# Patient Record
Sex: MALE | Race: WHITE | Employment: FULL TIME | URBAN - NONMETROPOLITAN AREA
[De-identification: names, ages, dates, MRNs, and addresses within clinical notes are randomized per-mention and may not be internally consistent; named-entity substitution may affect disease eponyms.]

---

## 2017-01-06 ENCOUNTER — NURSE ONLY (OUTPATIENT)
Dept: PRIMARY CARE CLINIC | Age: 57
End: 2017-01-06
Payer: COMMERCIAL

## 2017-01-06 DIAGNOSIS — E78.2 MIXED HYPERLIPIDEMIA: Primary | ICD-10-CM

## 2017-01-06 DIAGNOSIS — Z51.81 ENCOUNTER FOR MEDICATION MONITORING: ICD-10-CM

## 2017-01-06 LAB
ALBUMIN SERPL-MCNC: 4.3 G/DL (ref 3.5–5.2)
ALP BLD-CCNC: 83 U/L (ref 40–130)
ALT SERPL-CCNC: 34 U/L (ref 5–41)
ANION GAP SERPL CALCULATED.3IONS-SCNC: 17 MMOL/L (ref 7–19)
AST SERPL-CCNC: 19 U/L (ref 5–40)
BILIRUB SERPL-MCNC: 0.4 MG/DL (ref 0.2–1.2)
BUN BLDV-MCNC: 14 MG/DL (ref 6–20)
CALCIUM SERPL-MCNC: 8.8 MG/DL (ref 8.6–10)
CHLORIDE BLD-SCNC: 104 MMOL/L (ref 98–111)
CHOLESTEROL, TOTAL: 215 MG/DL (ref 160–199)
CO2: 22 MMOL/L (ref 22–29)
CREAT SERPL-MCNC: 1 MG/DL (ref 0.5–1.2)
GFR NON-AFRICAN AMERICAN: >60
GLOBULIN: 2.1 G/DL
GLUCOSE BLD-MCNC: 93 MG/DL (ref 74–109)
HDLC SERPL-MCNC: 30 MG/DL (ref 55–121)
LDL CHOLESTEROL CALCULATED: 157 MG/DL
POTASSIUM SERPL-SCNC: 4.8 MMOL/L (ref 3.5–5)
SODIUM BLD-SCNC: 143 MMOL/L (ref 136–145)
TOTAL PROTEIN: 6.4 G/DL (ref 6.6–8.7)
TRIGL SERPL-MCNC: 138 MG/DL (ref 150–199)

## 2017-01-06 PROCEDURE — 36415 COLL VENOUS BLD VENIPUNCTURE: CPT | Performed by: NURSE PRACTITIONER

## 2017-02-09 ENCOUNTER — OFFICE VISIT (OUTPATIENT)
Dept: PRIMARY CARE CLINIC | Age: 57
End: 2017-02-09
Payer: COMMERCIAL

## 2017-02-09 VITALS
DIASTOLIC BLOOD PRESSURE: 70 MMHG | BODY MASS INDEX: 27.02 KG/M2 | TEMPERATURE: 98.6 F | RESPIRATION RATE: 16 BRPM | OXYGEN SATURATION: 98 % | SYSTOLIC BLOOD PRESSURE: 118 MMHG | HEIGHT: 73 IN | HEART RATE: 73 BPM | WEIGHT: 203.9 LBS

## 2017-02-09 DIAGNOSIS — R52 BODY ACHES: ICD-10-CM

## 2017-02-09 DIAGNOSIS — R05.9 COUGH: ICD-10-CM

## 2017-02-09 DIAGNOSIS — Z23 NEED FOR INFLUENZA VACCINATION: ICD-10-CM

## 2017-02-09 DIAGNOSIS — J40 BRONCHITIS: Primary | ICD-10-CM

## 2017-02-09 DIAGNOSIS — R09.89 CHEST CONGESTION: ICD-10-CM

## 2017-02-09 PROCEDURE — 94640 AIRWAY INHALATION TREATMENT: CPT | Performed by: NURSE PRACTITIONER

## 2017-02-09 PROCEDURE — 87804 INFLUENZA ASSAY W/OPTIC: CPT | Performed by: NURSE PRACTITIONER

## 2017-02-09 PROCEDURE — 96372 THER/PROPH/DIAG INJ SC/IM: CPT | Performed by: NURSE PRACTITIONER

## 2017-02-09 PROCEDURE — 99213 OFFICE O/P EST LOW 20 MIN: CPT | Performed by: NURSE PRACTITIONER

## 2017-02-09 PROCEDURE — 90688 IIV4 VACCINE SPLT 0.5 ML IM: CPT | Performed by: NURSE PRACTITIONER

## 2017-02-09 PROCEDURE — 90471 IMMUNIZATION ADMIN: CPT | Performed by: NURSE PRACTITIONER

## 2017-02-09 RX ORDER — PREDNISONE 10 MG/1
TABLET ORAL
Qty: 18 TABLET | Refills: 0 | Status: SHIPPED | OUTPATIENT
Start: 2017-02-09 | End: 2017-03-27

## 2017-02-09 RX ORDER — CEFUROXIME AXETIL 250 MG/1
TABLET ORAL
Refills: 0 | COMMUNITY
Start: 2016-12-30 | End: 2017-02-09

## 2017-02-09 RX ORDER — ROSUVASTATIN CALCIUM 10 MG/1
10 TABLET, FILM COATED ORAL DAILY
Qty: 30 TABLET | Refills: 11 | Status: SHIPPED | OUTPATIENT
Start: 2017-02-09 | End: 2018-01-22 | Stop reason: SDUPTHER

## 2017-02-09 RX ORDER — AZITHROMYCIN 250 MG/1
TABLET, FILM COATED ORAL
Qty: 1 PACKET | Refills: 0 | Status: SHIPPED | OUTPATIENT
Start: 2017-02-09 | End: 2017-02-19

## 2017-02-09 RX ORDER — TRIAMCINOLONE ACETONIDE 40 MG/ML
40 INJECTION, SUSPENSION INTRA-ARTICULAR; INTRAMUSCULAR ONCE
Status: COMPLETED | OUTPATIENT
Start: 2017-02-09 | End: 2017-02-09

## 2017-02-09 RX ADMIN — TRIAMCINOLONE ACETONIDE 40 MG: 40 INJECTION, SUSPENSION INTRA-ARTICULAR; INTRAMUSCULAR at 09:36

## 2017-02-09 ASSESSMENT — ENCOUNTER SYMPTOMS: COUGH: 1

## 2017-02-10 LAB
INFLUENZA A ANTIBODY: NORMAL
INFLUENZA B ANTIBODY: NORMAL

## 2017-03-21 DIAGNOSIS — G44.021 INTRACTABLE CHRONIC CLUSTER HEADACHE: ICD-10-CM

## 2017-03-21 RX ORDER — SUMATRIPTAN 6 MG/.5ML
INJECTION, SOLUTION SUBCUTANEOUS
Qty: 0.5 ML | Refills: 5 | Status: SHIPPED | OUTPATIENT
Start: 2017-03-21 | End: 2017-07-07

## 2017-03-27 ENCOUNTER — OFFICE VISIT (OUTPATIENT)
Dept: NEUROLOGY | Age: 57
End: 2017-03-27
Payer: COMMERCIAL

## 2017-03-27 VITALS
SYSTOLIC BLOOD PRESSURE: 117 MMHG | DIASTOLIC BLOOD PRESSURE: 71 MMHG | RESPIRATION RATE: 16 BRPM | HEART RATE: 74 BPM | HEIGHT: 73 IN | BODY MASS INDEX: 26.51 KG/M2 | WEIGHT: 200 LBS

## 2017-03-27 DIAGNOSIS — G44.021 INTRACTABLE CHRONIC CLUSTER HEADACHE: Primary | ICD-10-CM

## 2017-03-27 PROCEDURE — 99213 OFFICE O/P EST LOW 20 MIN: CPT | Performed by: PHYSICIAN ASSISTANT

## 2017-05-08 ENCOUNTER — TELEPHONE (OUTPATIENT)
Dept: NEUROLOGY | Age: 57
End: 2017-05-08

## 2017-05-12 RX ORDER — BUTALBITAL, ACETAMINOPHEN AND CAFFEINE 50; 325; 40 MG/1; MG/1; MG/1
TABLET ORAL
Qty: 30 TABLET | Refills: 2 | Status: SHIPPED | OUTPATIENT
Start: 2017-05-12 | End: 2017-09-25 | Stop reason: SDUPTHER

## 2017-05-12 RX ORDER — METHYLPREDNISOLONE 4 MG/1
TABLET ORAL
Qty: 1 KIT | Refills: 0 | Status: SHIPPED | OUTPATIENT
Start: 2017-05-12 | End: 2017-05-18

## 2017-06-22 ENCOUNTER — TELEPHONE (OUTPATIENT)
Dept: NEUROLOGY | Age: 57
End: 2017-06-22

## 2017-06-27 ENCOUNTER — TELEPHONE (OUTPATIENT)
Dept: NEUROLOGY | Age: 57
End: 2017-06-27

## 2017-06-29 ENCOUNTER — OFFICE VISIT (OUTPATIENT)
Dept: NEUROLOGY | Age: 57
End: 2017-06-29
Payer: COMMERCIAL

## 2017-06-29 VITALS
WEIGHT: 197.6 LBS | HEART RATE: 82 BPM | RESPIRATION RATE: 16 BRPM | HEIGHT: 73 IN | BODY MASS INDEX: 26.19 KG/M2 | DIASTOLIC BLOOD PRESSURE: 66 MMHG | SYSTOLIC BLOOD PRESSURE: 136 MMHG

## 2017-06-29 DIAGNOSIS — G44.021 INTRACTABLE CHRONIC CLUSTER HEADACHE: Primary | ICD-10-CM

## 2017-06-29 PROCEDURE — 99214 OFFICE O/P EST MOD 30 MIN: CPT | Performed by: PSYCHIATRY & NEUROLOGY

## 2017-06-29 RX ORDER — LANOLIN ALCOHOL/MO/W.PET/CERES
3 CREAM (GRAM) TOPICAL DAILY
COMMUNITY

## 2017-06-29 RX ORDER — ALPRAZOLAM 0.5 MG/1
0.5 TABLET ORAL 2 TIMES DAILY PRN
Qty: 60 TABLET | Refills: 2 | Status: SHIPPED | OUTPATIENT
Start: 2017-06-29 | End: 2018-02-07 | Stop reason: SDUPTHER

## 2017-06-29 RX ORDER — PREDNISONE 20 MG/1
TABLET ORAL
Qty: 20 TABLET | Refills: 0 | Status: SHIPPED | OUTPATIENT
Start: 2017-06-29 | End: 2017-07-07

## 2017-07-07 ENCOUNTER — OFFICE VISIT (OUTPATIENT)
Dept: PRIMARY CARE CLINIC | Age: 57
End: 2017-07-07
Payer: COMMERCIAL

## 2017-07-07 VITALS
WEIGHT: 200 LBS | RESPIRATION RATE: 16 BRPM | HEART RATE: 68 BPM | SYSTOLIC BLOOD PRESSURE: 114 MMHG | DIASTOLIC BLOOD PRESSURE: 69 MMHG | TEMPERATURE: 97.5 F | HEIGHT: 73 IN | BODY MASS INDEX: 26.51 KG/M2

## 2017-07-07 DIAGNOSIS — G44.001 CLUSTER HEADACHE, INTRACTABLE: ICD-10-CM

## 2017-07-07 DIAGNOSIS — Z79.899 MEDICATION MANAGEMENT: ICD-10-CM

## 2017-07-07 DIAGNOSIS — Z12.5 PROSTATE CANCER SCREENING: ICD-10-CM

## 2017-07-07 DIAGNOSIS — E78.2 MIXED HYPERLIPIDEMIA: ICD-10-CM

## 2017-07-07 DIAGNOSIS — Z00.00 WELL ADULT HEALTH CHECK: Primary | ICD-10-CM

## 2017-07-07 DIAGNOSIS — F17.210 CIGARETTE NICOTINE DEPENDENCE WITHOUT COMPLICATION: ICD-10-CM

## 2017-07-07 DIAGNOSIS — I65.23 BILATERAL CAROTID ARTERY STENOSIS: ICD-10-CM

## 2017-07-07 LAB
ALBUMIN SERPL-MCNC: 4.1 G/DL (ref 3.5–5.2)
ALP BLD-CCNC: 77 U/L (ref 40–130)
ALT SERPL-CCNC: 43 U/L (ref 5–41)
ANION GAP SERPL CALCULATED.3IONS-SCNC: 20 MMOL/L (ref 7–19)
AST SERPL-CCNC: 20 U/L (ref 5–40)
BILIRUB SERPL-MCNC: 0.4 MG/DL (ref 0.2–1.2)
BUN BLDV-MCNC: 13 MG/DL (ref 6–20)
CALCIUM SERPL-MCNC: 9.3 MG/DL (ref 8.6–10)
CHLORIDE BLD-SCNC: 103 MMOL/L (ref 98–111)
CHOLESTEROL, TOTAL: 190 MG/DL (ref 160–199)
CO2: 20 MMOL/L (ref 22–29)
CREAT SERPL-MCNC: 1.1 MG/DL (ref 0.5–1.2)
GFR NON-AFRICAN AMERICAN: >60
GLUCOSE BLD-MCNC: 102 MG/DL (ref 74–109)
HCT VFR BLD CALC: 46.4 % (ref 42–52)
HDLC SERPL-MCNC: 46 MG/DL (ref 55–121)
HEMOGLOBIN: 15.9 G/DL (ref 14–18)
LDL CHOLESTEROL CALCULATED: 105 MG/DL
MCH RBC QN AUTO: 34.2 PG (ref 27–31)
MCHC RBC AUTO-ENTMCNC: 34.3 G/DL (ref 33–37)
MCV RBC AUTO: 99.8 FL (ref 80–94)
PDW BLD-RTO: 13.7 % (ref 11.5–14.5)
PLATELET # BLD: 245 K/UL (ref 130–400)
PMV BLD AUTO: 9.6 FL (ref 9.4–12.4)
POTASSIUM SERPL-SCNC: 4.2 MMOL/L (ref 3.5–5)
PROSTATE SPECIFIC ANTIGEN: 0.54 NG/ML (ref 0–4)
RBC # BLD: 4.65 M/UL (ref 4.7–6.1)
SODIUM BLD-SCNC: 143 MMOL/L (ref 136–145)
TOTAL PROTEIN: 7.1 G/DL (ref 6.6–8.7)
TRIGL SERPL-MCNC: 197 MG/DL (ref 150–199)
WBC # BLD: 16.6 K/UL (ref 4.8–10.8)

## 2017-07-07 PROCEDURE — 36415 COLL VENOUS BLD VENIPUNCTURE: CPT | Performed by: NURSE PRACTITIONER

## 2017-07-07 PROCEDURE — 99396 PREV VISIT EST AGE 40-64: CPT | Performed by: NURSE PRACTITIONER

## 2017-07-07 RX ORDER — CEFUROXIME AXETIL 250 MG/1
TABLET ORAL
Refills: 5 | COMMUNITY
Start: 2017-06-14 | End: 2018-04-02 | Stop reason: ALTCHOICE

## 2017-07-07 ASSESSMENT — ENCOUNTER SYMPTOMS
EYE ITCHING: 0
PHOTOPHOBIA: 0
EYE DISCHARGE: 0
ALLERGIC/IMMUNOLOGIC NEGATIVE: 1
EYE PAIN: 0
EYE REDNESS: 0

## 2017-07-28 ENCOUNTER — NURSE ONLY (OUTPATIENT)
Dept: PRIMARY CARE CLINIC | Age: 57
End: 2017-07-28
Payer: COMMERCIAL

## 2017-07-28 DIAGNOSIS — D72.828 OTHER ELEVATED WHITE BLOOD CELL (WBC) COUNT: Primary | ICD-10-CM

## 2017-07-28 LAB
BASOPHILS ABSOLUTE: 0 K/UL (ref 0–0.2)
BASOPHILS RELATIVE PERCENT: 0.4 % (ref 0–1)
EOSINOPHILS ABSOLUTE: 0.2 K/UL (ref 0–0.6)
EOSINOPHILS RELATIVE PERCENT: 1.9 % (ref 0–5)
HCT VFR BLD CALC: 45.1 % (ref 42–52)
HEMOGLOBIN: 15.2 G/DL (ref 14–18)
LYMPHOCYTES ABSOLUTE: 2.7 K/UL (ref 1.1–4.5)
LYMPHOCYTES RELATIVE PERCENT: 27.6 % (ref 20–40)
MCH RBC QN AUTO: 34.2 PG (ref 27–31)
MCHC RBC AUTO-ENTMCNC: 33.7 G/DL (ref 33–37)
MCV RBC AUTO: 101.3 FL (ref 80–94)
MONOCYTES ABSOLUTE: 0.8 K/UL (ref 0–0.9)
MONOCYTES RELATIVE PERCENT: 7.9 % (ref 0–10)
NEUTROPHILS ABSOLUTE: 6.1 K/UL (ref 1.5–7.5)
NEUTROPHILS RELATIVE PERCENT: 61.9 % (ref 50–65)
PDW BLD-RTO: 13.3 % (ref 11.5–14.5)
PLATELET # BLD: 254 K/UL (ref 130–400)
PMV BLD AUTO: 9.6 FL (ref 9.4–12.4)
RBC # BLD: 4.45 M/UL (ref 4.7–6.1)
WBC # BLD: 9.9 K/UL (ref 4.8–10.8)

## 2017-07-28 PROCEDURE — 36415 COLL VENOUS BLD VENIPUNCTURE: CPT | Performed by: NURSE PRACTITIONER

## 2017-08-10 DIAGNOSIS — G44.021 INTRACTABLE CHRONIC CLUSTER HEADACHE: ICD-10-CM

## 2017-08-10 RX ORDER — VERAPAMIL HYDROCHLORIDE 240 MG/1
240 TABLET, FILM COATED, EXTENDED RELEASE ORAL 2 TIMES DAILY
Qty: 60 TABLET | Refills: 5 | Status: SHIPPED | OUTPATIENT
Start: 2017-08-10 | End: 2018-01-22 | Stop reason: SDUPTHER

## 2017-10-02 ENCOUNTER — OFFICE VISIT (OUTPATIENT)
Dept: NEUROLOGY | Age: 57
End: 2017-10-02
Payer: COMMERCIAL

## 2017-10-02 VITALS
SYSTOLIC BLOOD PRESSURE: 109 MMHG | HEIGHT: 73 IN | WEIGHT: 197 LBS | RESPIRATION RATE: 18 BRPM | HEART RATE: 67 BPM | BODY MASS INDEX: 26.11 KG/M2 | DIASTOLIC BLOOD PRESSURE: 72 MMHG

## 2017-10-02 DIAGNOSIS — G44.021 INTRACTABLE CHRONIC CLUSTER HEADACHE: Primary | ICD-10-CM

## 2017-10-02 PROCEDURE — 99213 OFFICE O/P EST LOW 20 MIN: CPT | Performed by: PSYCHIATRY & NEUROLOGY

## 2017-10-02 NOTE — MR AVS SNAPSHOT
Blood Pressure Pulse Respirations Height Weight Body Mass Index    109/72 67 18 6' 1\" (1.854 m) 197 lb (89.4 kg) 25.99 kg/m2    Smoking Status                   Current Every Day Smoker           Additional Information about your Body Mass Index (BMI)           Your BMI as listed above is considered overweight (25.0-29.9). BMI is an estimate of body fat, calculated from your height and weight. The higher your BMI, the greater your risk of heart disease, high blood pressure, type 2 diabetes, stroke, gallstones, arthritis, sleep apnea, and certain cancers. BMI is not perfect. It may overestimate body fat in athletes and people who are more muscular. If your body fat is high you can improve your BMI by decreasing your calorie intake and becoming more physically active. Learn more at: [email or web address]             Medications and Orders      Your Current Medications Are              butalbital-acetaminophen-caffeine (FIORICET, ESGIC) -40 MG per tablet TAKE 1 TABLET BY MOUTH TWICE DAILY AS NEEDED FOR HEADACHE    verapamil (CALAN SR) 240 MG extended release tablet Take 1 tablet by mouth 2 times daily    SUMAtriptan Succinate 6 MG/0.5ML SOAJ     melatonin 3 MG TABS tablet Take 3 mg by mouth daily Indications: 2 PO QHS    CRESTOR 10 MG tablet Take 1 tablet by mouth daily    SUMAtriptan (IMITREX) 100 MG tablet Take 1 at the onset of headache, may repeat 1 time in 2 hours if headache persists, maximum of 2 in 24 hours or 4 in 1 week. sildenafil (VIAGRA) 50 MG tablet Take 1 tablet by mouth as needed for Erectile Dysfunction    aspirin 81 MG tablet Take 81 mg by mouth daily.       Allergies           No Known Allergies         Additional Information        Basic Information     Date Of Birth Sex Race Ethnicity Preferred Language    1960 Male White Non-/Non  English      Problem List as of 10/2/2017  Date Reviewed: 10/2/2017 Cluster headache, intractable    Intractable chronic cluster headache    Migraine headache    Carotid artery stenosis    Colon cancer screening    Hyperlipidemia    Bilateral carotid artery stenosis    BPH (benign prostatic hyperplasia)    Migraines    Nicotine dependence      Immunizations as of 10/2/2017     Name Date    Influenza, Nancie Cranker, 3 Years and older, IM 2/9/2017    Pneumococcal Polysaccharide ([id number]) 6/27/2016    Td 6/27/2016      Preventive Care        Date Due    Diabetes Screening 8/4/2001    Yearly Flu Vaccine (1) 9/1/2017    Hepatitis C screening is recommended for all adults regardless of risk factors born between [facility] at least once (lifetime) who have never been tested. 7/18/2019 (Originally 1960)    HIV screening is recommended for all people regardless of risk factors  aged 15-65 years at least once (lifetime) who have never been HIV tested. 7/19/2019 (Originally 8/4/1976)    Tetanus Combination Vaccine (1 - Tdap) 2/11/2026 (Originally 6/28/2016)    Cholesterol Screening 7/7/2022    Colonoscopy 3/12/2023            [employer] Signup           Our records indicate that you have an active [employer] account. You can view your After Visit Summary by going to [email or web address] and logging in with your [employer] username and password. If you don't have a [employer] username and password but a parent or guardian has access to your record, the parent or guardian should login with their own [employer] username and password and access your record to view the After Visit Summary. Additional Information  If you have questions, please contact the physician practice where you receive care. Remember, [employer] is NOT to be used for urgent needs. For medical emergencies, dial 911. For questions regarding your [employer] account call [phone]. If you have a clinical question, please call your doctor's office.

## 2017-10-02 NOTE — PROGRESS NOTES
SR) 240 MG extended release tablet Take 1 tablet by mouth 2 times daily 60 tablet 5    SUMAtriptan Succinate 6 MG/0.5ML SOAJ   5    melatonin 3 MG TABS tablet Take 3 mg by mouth daily Indications: 2 PO QHS      CRESTOR 10 MG tablet Take 1 tablet by mouth daily 30 tablet 11    SUMAtriptan (IMITREX) 100 MG tablet Take 1 at the onset of headache, may repeat 1 time in 2 hours if headache persists, maximum of 2 in 24 hours or 4 in 1 week. 9 tablet 11    sildenafil (VIAGRA) 50 MG tablet Take 1 tablet by mouth as needed for Erectile Dysfunction 12 tablet 3    aspirin 81 MG tablet Take 81 mg by mouth daily. No current facility-administered medications for this visit. Allergies: Allergies as of 10/02/2017    (No Known Allergies)       Examination:  Vitals:  /72  Pulse 67  Resp 18  Ht 6' 1\" (1.854 m)  Wt 197 lb (89.4 kg)  BMI 25.99 kg/m2  General appearance:  alert and cooperative with exam  HEENT:  Sclera clear, anicteric, Oropharynx clear, no lesions, Neck supple with midline trachea, Thyroid without masses and Trachea midline  Heart[de-identified]  regular rate and rhythm, S1, S2 normal, no murmur, click, rub or gallop  Lungs:  clear to auscultation bilaterally  Extremities:  extremities normal, atraumatic, no cyanosis or edema  Neurologic:  Extraocular movements are intact without nystagmus. Visual fields are full to confrontation. Facial movements are symmetrical and normal.  Speech is precise. Extremity strength is normal in both uppers and lowers. Deep tendon reflexes are intact and symmetrical.  Rapid alternating movements are unimpaired. Finger-to-nose testing is performed well, without dysmetria. Gait is normal.      Assessment:       ICD-10-CM ICD-9-CM    1. Intractable chronic cluster headache G44.021 339.02    Doing well clinically    Plan:   1. Continue the same medications  2. Advised smoking cessation  3. FU in 6 months.     Electronically signed by Millie Mac MD on 10/2/2017

## 2017-10-05 ENCOUNTER — HOSPITAL ENCOUNTER (OUTPATIENT)
Dept: VASCULAR LAB | Age: 57
Discharge: HOME | End: 2017-10-05
Payer: COMMERCIAL

## 2017-10-05 ENCOUNTER — TELEPHONE (OUTPATIENT)
Dept: VASCULAR SURGERY | Age: 57
End: 2017-10-05

## 2017-10-05 DIAGNOSIS — I65.23 BILATERAL CAROTID ARTERY STENOSIS: Primary | ICD-10-CM

## 2017-10-05 DIAGNOSIS — I65.23 BILATERAL CAROTID ARTERY STENOSIS: ICD-10-CM

## 2017-10-05 PROCEDURE — 93880 EXTRACRANIAL BILAT STUDY: CPT

## 2018-01-04 DIAGNOSIS — G44.021 INTRACTABLE CHRONIC CLUSTER HEADACHE: ICD-10-CM

## 2018-01-05 RX ORDER — SUMATRIPTAN 100 MG/1
TABLET, FILM COATED ORAL
Qty: 9 TABLET | Refills: 0 | Status: SHIPPED | OUTPATIENT
Start: 2018-01-05 | End: 2018-02-18 | Stop reason: SDUPTHER

## 2018-01-19 ENCOUNTER — NURSE ONLY (OUTPATIENT)
Dept: PRIMARY CARE CLINIC | Age: 58
End: 2018-01-19
Payer: COMMERCIAL

## 2018-01-19 DIAGNOSIS — Z51.81 ENCOUNTER FOR MEDICATION MONITORING: ICD-10-CM

## 2018-01-19 DIAGNOSIS — E78.2 MIXED HYPERLIPIDEMIA: Primary | ICD-10-CM

## 2018-01-19 LAB
ALBUMIN SERPL-MCNC: 4.1 G/DL (ref 3.5–5.2)
ALP BLD-CCNC: 84 U/L (ref 40–130)
ALT SERPL-CCNC: 18 U/L (ref 5–41)
ANION GAP SERPL CALCULATED.3IONS-SCNC: 16 MMOL/L (ref 7–19)
AST SERPL-CCNC: 18 U/L (ref 5–40)
BILIRUB SERPL-MCNC: 0.4 MG/DL (ref 0.2–1.2)
BUN BLDV-MCNC: 10 MG/DL (ref 6–20)
CALCIUM SERPL-MCNC: 8.9 MG/DL (ref 8.6–10)
CHLORIDE BLD-SCNC: 103 MMOL/L (ref 98–111)
CHOLESTEROL, TOTAL: 186 MG/DL (ref 160–199)
CO2: 22 MMOL/L (ref 22–29)
CREAT SERPL-MCNC: 1 MG/DL (ref 0.5–1.2)
GFR NON-AFRICAN AMERICAN: >60
GLUCOSE BLD-MCNC: 81 MG/DL (ref 74–109)
HDLC SERPL-MCNC: 30 MG/DL (ref 55–121)
LDL CHOLESTEROL CALCULATED: 132 MG/DL
POTASSIUM SERPL-SCNC: 4.4 MMOL/L (ref 3.5–5)
SODIUM BLD-SCNC: 141 MMOL/L (ref 136–145)
TOTAL PROTEIN: 6.9 G/DL (ref 6.6–8.7)
TRIGL SERPL-MCNC: 121 MG/DL (ref 0–149)

## 2018-01-19 PROCEDURE — 36415 COLL VENOUS BLD VENIPUNCTURE: CPT | Performed by: NURSE PRACTITIONER

## 2018-01-22 DIAGNOSIS — G44.021 INTRACTABLE CHRONIC CLUSTER HEADACHE: ICD-10-CM

## 2018-01-22 RX ORDER — VERAPAMIL HYDROCHLORIDE 240 MG/1
240 TABLET, FILM COATED, EXTENDED RELEASE ORAL 2 TIMES DAILY
Qty: 60 TABLET | Refills: 5 | Status: SHIPPED | OUTPATIENT
Start: 2018-01-22 | End: 2018-07-25 | Stop reason: SDUPTHER

## 2018-01-22 RX ORDER — ROSUVASTATIN CALCIUM 10 MG/1
10 TABLET, FILM COATED ORAL DAILY
Qty: 30 TABLET | Refills: 11 | Status: SHIPPED | OUTPATIENT
Start: 2018-01-22 | End: 2018-07-27 | Stop reason: SDUPTHER

## 2018-02-07 RX ORDER — ALPRAZOLAM 0.5 MG/1
TABLET ORAL
Qty: 60 TABLET | Refills: 2 | Status: SHIPPED | OUTPATIENT
Start: 2018-02-07 | End: 2018-03-09

## 2018-02-15 RX ORDER — ROSUVASTATIN CALCIUM 10 MG/1
10 TABLET, FILM COATED ORAL DAILY
Qty: 30 TABLET | Refills: 5 | Status: SHIPPED | OUTPATIENT
Start: 2018-02-15 | End: 2018-03-05

## 2018-03-05 ENCOUNTER — OFFICE VISIT (OUTPATIENT)
Dept: GASTROENTEROLOGY | Age: 58
End: 2018-03-05
Payer: COMMERCIAL

## 2018-03-05 VITALS
SYSTOLIC BLOOD PRESSURE: 120 MMHG | DIASTOLIC BLOOD PRESSURE: 66 MMHG | OXYGEN SATURATION: 97 % | HEART RATE: 65 BPM | WEIGHT: 206 LBS | HEIGHT: 73 IN | BODY MASS INDEX: 27.3 KG/M2

## 2018-03-05 DIAGNOSIS — Z86.010 HISTORY OF ADENOMATOUS POLYP OF COLON: Primary | ICD-10-CM

## 2018-03-05 PROBLEM — Z86.0101 HISTORY OF ADENOMATOUS POLYP OF COLON: Status: ACTIVE | Noted: 2018-03-05

## 2018-03-05 PROCEDURE — 99203 OFFICE O/P NEW LOW 30 MIN: CPT | Performed by: NURSE PRACTITIONER

## 2018-03-05 ASSESSMENT — ENCOUNTER SYMPTOMS
ABDOMINAL DISTENTION: 0
DIARRHEA: 0
RECTAL PAIN: 0
SHORTNESS OF BREATH: 0
SORE THROAT: 0
COUGH: 0
BACK PAIN: 0
VOICE CHANGE: 0
NAUSEA: 0
CONSTIPATION: 0
ABDOMINAL PAIN: 0
CHEST TIGHTNESS: 0
BLOOD IN STOOL: 0
VOMITING: 0

## 2018-03-05 NOTE — PROGRESS NOTES
Subjective:      Patient ID: Maye Thomas is a 65 y.o. male. PCP: Anitra Nguyễn CNP     HPI    Chief Complaint   Patient presents with    Colonoscopy     5yr recall    Other     history of colon polyps       Patient due for screening colonoscopy, history of adenomatous colon polyps    The patient denies abdominal or flank pain, anorexia, nausea or vomiting, dysphagia, change in bowel habits or black or bloody stools or weight loss. Family History   Problem Relation Age of Onset    Cancer Mother      Lung CA    Cancer Father      Breast CA    Cancer Sister      breast cancer    Cancer Other 58    Cancer Other 59       Past Medical History:   Diagnosis Date    BPH (benign prostatic hyperplasia)     Carotid artery stenosis     Cluster headache, intractable     Hyperlipidemia     Migraine headache     Other malaise and fatigue        Past Surgical History:   Procedure Laterality Date    BACK SURGERY      COLONOSCOPY  03/15/2013    Pamela: adenomatous and hyperplastic polyps (5 yr)    HERNIA REPAIR         Current Outpatient Prescriptions   Medication Sig Dispense Refill    SUMAtriptan (IMITREX) 100 MG tablet TAKE 1 TABLET BY MOUTH AT ONSET OF HEADACHE. MAY REPEAT 1 TIME IN 2 HOURS IF HEADACHE PERSISTS.  MAXIMUM OF 2 IN 24 HOURS OR 4 IN 1 WEEK 9 tablet 5    ALPRAZolam (XANAX) 0.5 MG tablet TAKE 1 TABLET BY MOUTH TWICE DAILY AS NEEDED FOR SLEEP OR ANXIETY 60 tablet 2    CRESTOR 10 MG tablet Take 1 tablet by mouth daily 30 tablet 11    verapamil (CALAN SR) 240 MG extended release tablet Take 1 tablet by mouth 2 times daily 60 tablet 5    butalbital-acetaminophen-caffeine (FIORICET, ESGIC) -40 MG per tablet TAKE 1 TABLET BY MOUTH TWICE DAILY AS NEEDED FOR HEADACHE 30 tablet 2    SUMAtriptan Succinate 6 MG/0.5ML SOAJ   5    melatonin 3 MG TABS tablet Take 3 mg by mouth daily Indications: 2 PO QHS      sildenafil (VIAGRA) 50 MG tablet Take 1 tablet by mouth as needed for Erectile Dysfunction 12 tablet 3    aspirin 81 MG tablet Take 81 mg by mouth daily        No current facility-administered medications for this visit. No Known Allergies     reports that he has been smoking Cigarettes. He has a 35.00 pack-year smoking history. He has never used smokeless tobacco. He reports that he does not drink alcohol or use drugs. Review of Systems   Constitutional: Negative for appetite change, fever and unexpected weight change. HENT: Negative for sore throat and voice change. Respiratory: Negative for cough, chest tightness and shortness of breath. Cardiovascular: Negative for chest pain, palpitations and leg swelling. Gastrointestinal: Negative for abdominal distention, abdominal pain, blood in stool, constipation, diarrhea, nausea, rectal pain and vomiting. Musculoskeletal: Negative for arthralgias, back pain and gait problem. Skin: Negative for pallor, rash and wound. Neurological: Negative for dizziness, weakness and light-headedness. Hematological: Negative for adenopathy. Does not bruise/bleed easily. All other systems reviewed and are negative. Objective:   Physical Exam   Constitutional: He is oriented to person, place, and time. He appears well-developed and well-nourished. No distress. /66   Pulse 65   Ht 6' 1\" (1.854 m)   Wt 206 lb (93.4 kg)   SpO2 97%   BMI 27.18 kg/m²      Eyes: Conjunctivae are normal. No scleral icterus. Neck: No tracheal deviation present. Cardiovascular: Normal rate and regular rhythm. Exam reveals no gallop and no friction rub. No murmur heard. Pulmonary/Chest: Effort normal and breath sounds normal. No respiratory distress. He has no wheezes. He has no rhonchi. He has no rales. Abdominal: Soft. Normal appearance and bowel sounds are normal. He exhibits no distension and no mass. There is no hepatomegaly. There is no tenderness. There is no rebound and no guarding. Musculoskeletal: He exhibits no edema.

## 2018-04-02 ENCOUNTER — OFFICE VISIT (OUTPATIENT)
Dept: NEUROLOGY | Age: 58
End: 2018-04-02
Payer: COMMERCIAL

## 2018-04-02 VITALS
HEIGHT: 73 IN | SYSTOLIC BLOOD PRESSURE: 114 MMHG | HEART RATE: 68 BPM | DIASTOLIC BLOOD PRESSURE: 68 MMHG | WEIGHT: 200 LBS | BODY MASS INDEX: 26.51 KG/M2

## 2018-04-02 DIAGNOSIS — G44.021 INTRACTABLE CHRONIC CLUSTER HEADACHE: Primary | ICD-10-CM

## 2018-04-02 PROCEDURE — 99213 OFFICE O/P EST LOW 20 MIN: CPT | Performed by: PSYCHIATRY & NEUROLOGY

## 2018-04-02 RX ORDER — PREDNISONE 20 MG/1
TABLET ORAL
Qty: 20 TABLET | Refills: 0 | Status: SHIPPED | OUTPATIENT
Start: 2018-04-02 | End: 2018-06-19

## 2018-04-03 ENCOUNTER — TELEPHONE (OUTPATIENT)
Dept: NEUROLOGY | Age: 58
End: 2018-04-03

## 2018-04-20 ENCOUNTER — ANESTHESIA (OUTPATIENT)
Dept: ENDOSCOPY | Age: 58
End: 2018-04-20
Payer: COMMERCIAL

## 2018-04-20 ENCOUNTER — HOSPITAL ENCOUNTER (OUTPATIENT)
Age: 58
Setting detail: OUTPATIENT SURGERY
Discharge: HOME | End: 2018-04-20
Attending: INTERNAL MEDICINE | Admitting: INTERNAL MEDICINE
Payer: COMMERCIAL

## 2018-04-20 ENCOUNTER — ANESTHESIA EVENT (OUTPATIENT)
Dept: ENDOSCOPY | Age: 58
End: 2018-04-20
Payer: COMMERCIAL

## 2018-04-20 VITALS
SYSTOLIC BLOOD PRESSURE: 112 MMHG | WEIGHT: 207 LBS | HEIGHT: 73 IN | BODY MASS INDEX: 27.43 KG/M2 | TEMPERATURE: 97.9 F | RESPIRATION RATE: 18 BRPM | HEART RATE: 57 BPM | OXYGEN SATURATION: 100 % | DIASTOLIC BLOOD PRESSURE: 71 MMHG

## 2018-04-20 VITALS — SYSTOLIC BLOOD PRESSURE: 91 MMHG | DIASTOLIC BLOOD PRESSURE: 54 MMHG | OXYGEN SATURATION: 92 %

## 2018-04-20 PROCEDURE — 45378 DIAGNOSTIC COLONOSCOPY: CPT | Performed by: INTERNAL MEDICINE

## 2018-04-20 PROCEDURE — 3700000000 HC ANESTHESIA ATTENDED CARE: Performed by: INTERNAL MEDICINE

## 2018-04-20 PROCEDURE — 6360000002 HC RX W HCPCS: Performed by: NURSE ANESTHETIST, CERTIFIED REGISTERED

## 2018-04-20 PROCEDURE — 7100000011 HC PHASE II RECOVERY - ADDTL 15 MIN: Performed by: INTERNAL MEDICINE

## 2018-04-20 PROCEDURE — 7100000010 HC PHASE II RECOVERY - FIRST 15 MIN: Performed by: INTERNAL MEDICINE

## 2018-04-20 PROCEDURE — 2500000003 HC RX 250 WO HCPCS: Performed by: NURSE ANESTHETIST, CERTIFIED REGISTERED

## 2018-04-20 PROCEDURE — 2580000003 HC RX 258: Performed by: INTERNAL MEDICINE

## 2018-04-20 PROCEDURE — 3609027000 HC COLONOSCOPY: Performed by: INTERNAL MEDICINE

## 2018-04-20 PROCEDURE — 3700000001 HC ADD 15 MINUTES (ANESTHESIA): Performed by: INTERNAL MEDICINE

## 2018-04-20 RX ORDER — PROMETHAZINE HYDROCHLORIDE 25 MG/ML
6.25 INJECTION, SOLUTION INTRAMUSCULAR; INTRAVENOUS
Status: DISCONTINUED | OUTPATIENT
Start: 2018-04-20 | End: 2018-04-20 | Stop reason: HOSPADM

## 2018-04-20 RX ORDER — ONDANSETRON 2 MG/ML
4 INJECTION INTRAMUSCULAR; INTRAVENOUS
Status: DISCONTINUED | OUTPATIENT
Start: 2018-04-20 | End: 2018-04-20 | Stop reason: HOSPADM

## 2018-04-20 RX ORDER — SODIUM CHLORIDE, SODIUM LACTATE, POTASSIUM CHLORIDE, CALCIUM CHLORIDE 600; 310; 30; 20 MG/100ML; MG/100ML; MG/100ML; MG/100ML
INJECTION, SOLUTION INTRAVENOUS CONTINUOUS
Status: DISCONTINUED | OUTPATIENT
Start: 2018-04-20 | End: 2018-04-20 | Stop reason: HOSPADM

## 2018-04-20 RX ORDER — PROPOFOL 10 MG/ML
INJECTION, EMULSION INTRAVENOUS PRN
Status: DISCONTINUED | OUTPATIENT
Start: 2018-04-20 | End: 2018-04-20 | Stop reason: SDUPTHER

## 2018-04-20 RX ORDER — DIPHENHYDRAMINE HYDROCHLORIDE 50 MG/ML
12.5 INJECTION INTRAMUSCULAR; INTRAVENOUS
Status: DISCONTINUED | OUTPATIENT
Start: 2018-04-20 | End: 2018-04-20 | Stop reason: HOSPADM

## 2018-04-20 RX ORDER — LIDOCAINE HYDROCHLORIDE 10 MG/ML
INJECTION, SOLUTION EPIDURAL; INFILTRATION; INTRACAUDAL; PERINEURAL PRN
Status: DISCONTINUED | OUTPATIENT
Start: 2018-04-20 | End: 2018-04-20 | Stop reason: SDUPTHER

## 2018-04-20 RX ADMIN — SODIUM CHLORIDE, SODIUM LACTATE, POTASSIUM CHLORIDE, AND CALCIUM CHLORIDE: 600; 310; 30; 20 INJECTION, SOLUTION INTRAVENOUS at 09:42

## 2018-04-20 RX ADMIN — PROPOFOL 220 MG: 10 INJECTION, EMULSION INTRAVENOUS at 09:47

## 2018-04-20 RX ADMIN — LIDOCAINE HYDROCHLORIDE 5 ML: 10 INJECTION, SOLUTION EPIDURAL; INFILTRATION; INTRACAUDAL; PERINEURAL at 09:47

## 2018-04-20 RX ADMIN — SODIUM CHLORIDE, SODIUM LACTATE, POTASSIUM CHLORIDE, AND CALCIUM CHLORIDE: 600; 310; 30; 20 INJECTION, SOLUTION INTRAVENOUS at 08:54

## 2018-04-20 ASSESSMENT — PAIN SCALES - GENERAL
PAINLEVEL_OUTOF10: 0
PAINLEVEL_OUTOF10: 0

## 2018-04-20 ASSESSMENT — LIFESTYLE VARIABLES: SMOKING_STATUS: 1

## 2018-06-13 RX ORDER — CEFUROXIME AXETIL 250 MG/1
TABLET ORAL
Qty: 9 CARTRIDGE | Refills: 5 | Status: SHIPPED | OUTPATIENT
Start: 2018-06-13 | End: 2018-10-04 | Stop reason: SDUPTHER

## 2018-06-14 ENCOUNTER — TELEPHONE (OUTPATIENT)
Dept: NEUROLOGY | Age: 58
End: 2018-06-14

## 2018-06-19 RX ORDER — PREDNISONE 20 MG/1
TABLET ORAL
Qty: 20 TABLET | Refills: 0 | Status: SHIPPED | OUTPATIENT
Start: 2018-06-19 | End: 2019-01-31

## 2018-07-25 DIAGNOSIS — G44.021 INTRACTABLE CHRONIC CLUSTER HEADACHE: ICD-10-CM

## 2018-07-26 RX ORDER — VERAPAMIL HYDROCHLORIDE 240 MG/1
TABLET, FILM COATED, EXTENDED RELEASE ORAL
Qty: 60 TABLET | Refills: 0 | Status: SHIPPED | OUTPATIENT
Start: 2018-07-26 | End: 2018-08-26 | Stop reason: SDUPTHER

## 2018-07-27 ENCOUNTER — OFFICE VISIT (OUTPATIENT)
Dept: PRIMARY CARE CLINIC | Age: 58
End: 2018-07-27
Payer: COMMERCIAL

## 2018-07-27 VITALS
TEMPERATURE: 97.8 F | WEIGHT: 201 LBS | SYSTOLIC BLOOD PRESSURE: 120 MMHG | RESPIRATION RATE: 16 BRPM | OXYGEN SATURATION: 98 % | DIASTOLIC BLOOD PRESSURE: 68 MMHG | HEART RATE: 97 BPM | BODY MASS INDEX: 26.64 KG/M2 | HEIGHT: 73 IN

## 2018-07-27 DIAGNOSIS — F17.210 CIGARETTE NICOTINE DEPENDENCE WITHOUT COMPLICATION: ICD-10-CM

## 2018-07-27 DIAGNOSIS — E78.2 MIXED HYPERLIPIDEMIA: ICD-10-CM

## 2018-07-27 DIAGNOSIS — I65.23 BILATERAL CAROTID ARTERY STENOSIS: ICD-10-CM

## 2018-07-27 DIAGNOSIS — Z13.6 ENCOUNTER FOR LIPID SCREENING FOR CARDIOVASCULAR DISEASE: ICD-10-CM

## 2018-07-27 DIAGNOSIS — Z00.00 WELL ADULT HEALTH CHECK: Primary | ICD-10-CM

## 2018-07-27 DIAGNOSIS — Z12.5 SCREENING FOR PROSTATE CANCER: ICD-10-CM

## 2018-07-27 DIAGNOSIS — Z13.220 ENCOUNTER FOR LIPID SCREENING FOR CARDIOVASCULAR DISEASE: ICD-10-CM

## 2018-07-27 DIAGNOSIS — Z13.1 SCREENING FOR DIABETES MELLITUS: ICD-10-CM

## 2018-07-27 PROCEDURE — 99396 PREV VISIT EST AGE 40-64: CPT | Performed by: NURSE PRACTITIONER

## 2018-07-27 RX ORDER — ROSUVASTATIN CALCIUM 10 MG/1
10 TABLET, FILM COATED ORAL DAILY
Qty: 30 TABLET | Refills: 11 | Status: SHIPPED | OUTPATIENT
Start: 2018-07-27 | End: 2018-10-01

## 2018-07-27 RX ORDER — FLUTICASONE PROPIONATE 50 MCG
1 SPRAY, SUSPENSION (ML) NASAL DAILY
Qty: 1 BOTTLE | Refills: 3 | Status: SHIPPED | OUTPATIENT
Start: 2018-07-27 | End: 2020-03-12

## 2018-07-27 ASSESSMENT — PATIENT HEALTH QUESTIONNAIRE - PHQ9
2. FEELING DOWN, DEPRESSED OR HOPELESS: 0
SUM OF ALL RESPONSES TO PHQ9 QUESTIONS 1 & 2: 0
SUM OF ALL RESPONSES TO PHQ QUESTIONS 1-9: 0
1. LITTLE INTEREST OR PLEASURE IN DOING THINGS: 0

## 2018-07-31 ASSESSMENT — ENCOUNTER SYMPTOMS
GASTROINTESTINAL NEGATIVE: 1
RESPIRATORY NEGATIVE: 1
EYES NEGATIVE: 1

## 2018-07-31 NOTE — PROGRESS NOTES
Outpatient Prescriptions   Medication Sig Dispense Refill    fluticasone (FLONASE) 50 MCG/ACT nasal spray 1 spray by Nasal route daily During spring and fall 1 Bottle 3    CRESTOR 10 MG tablet Take 1 tablet by mouth daily 30 tablet 11    verapamil (CALAN SR) 240 MG extended release tablet TAKE 1 TABLET BY MOUTH TWICE DAILY 60 tablet 0    predniSONE (DELTASONE) 20 MG tablet 3 PO q am for 3 days, then 2 PO q am for 3 days, then 1 PO q am for 3 days, then 1/2 PO q am for 3 days 20 tablet 0    SUMAtriptan Succinate 6 MG/0.5ML SOAJ INJECT 1 SYRINGE AT ONSET OF A HEADACHE, MAY REPEAT IN 2 HOURS IF HEADACHE PERSISTS. MAX OF 2 IN 24 HOURS OR 4 WEEKLY 9 Cartridge 5    butalbital-acetaminophen-caffeine (FIORICET, ESGIC) -40 MG per tablet TAKE 1 TABLET BY MOUTH TWICE DAILY AS NEEDED FOR HEADACHE 30 tablet 2    SUMAtriptan (IMITREX) 100 MG tablet TAKE 1 TABLET BY MOUTH AT ONSET OF HEADACHE. MAY REPEAT 1 TIME IN 2 HOURS IF HEADACHE PERSISTS. MAXIMUM OF 2 IN 24 HOURS OR 4 IN 1 WEEK 9 tablet 5    melatonin 3 MG TABS tablet Take 3 mg by mouth daily Indications: 2 PO QHS      sildenafil (VIAGRA) 50 MG tablet Take 1 tablet by mouth as needed for Erectile Dysfunction 12 tablet 3    aspirin 81 MG tablet Take 81 mg by mouth daily        No current facility-administered medications for this visit. No Known Allergies    Health Maintenance   Topic Date Due    Shingles Vaccine (1 of 2 - 2 Dose Series) 08/04/2011    Low dose CT lung screening  08/04/2016    Hepatitis C screen  07/18/2019 (Originally 1960)    HIV screen  07/19/2019 (Originally 8/4/1976)    DTaP/Tdap/Td vaccine (1 - Tdap) 02/11/2026 (Originally 6/28/2016)    Flu vaccine (1) 09/01/2018    Lipid screen  01/19/2023    Colon cancer screen colonoscopy  04/20/2023    Pneumococcal med risk  Completed       Subjective:      Review of Systems   Constitutional: Negative. HENT: Negative. Eyes: Negative. Respiratory: Negative.     Cardiovascular: discussed smoking cessation. Currently he is not ready to quit. He has several friends at work that have tolerated Chantix well. He is considering taking it. He will call me when he is ready. Patient given educational materials - see patient instructions. Discussed use, benefit, and side effects of prescribed medications. All patient questions answered. Pt voiced understanding. Reviewed health maintenance. Instructed to continue current medications, diet and exercise. Patient agreed with treatment plan. Follow up as directed. MEDICATIONS:  Orders Placed This Encounter   Medications    fluticasone (FLONASE) 50 MCG/ACT nasal spray     Si spray by Nasal route daily During spring and      Dispense:  1 Bottle     Refill:  3    CRESTOR 10 MG tablet     Sig: Take 1 tablet by mouth daily     Dispense:  30 tablet     Refill:  11         ORDERS:  Orders Placed This Encounter   Procedures    Comprehensive Metabolic Panel    Lipid Panel    Psa screening       Follow-up:  Return in about 1 year (around 2019) for 1-2 wks fasting labs. Kathrin Copper PATIENT INSTRUCTIONS:  Patient Instructions   If you want chantix call. If you have symptoms of depression let me. Electronically signed by GUILHERME Kelly CNP on 2018 at 2:09 PM    EMR Dragon/transcription disclaimer:  Much of this encounter note is electronic transcription/translation of spoken language to printed texts. The electronic translation of spoken language may be erroneous, or at times, nonsensical words or phrases may be inadvertently transcribed.   Although I have reviewed the note for such errors, some may still exist.

## 2018-08-26 DIAGNOSIS — G44.021 INTRACTABLE CHRONIC CLUSTER HEADACHE: ICD-10-CM

## 2018-08-27 RX ORDER — VERAPAMIL HYDROCHLORIDE 240 MG/1
TABLET, FILM COATED, EXTENDED RELEASE ORAL
Qty: 60 TABLET | Refills: 3 | Status: SHIPPED | OUTPATIENT
Start: 2018-08-27 | End: 2019-01-01 | Stop reason: SDUPTHER

## 2018-08-29 RX ORDER — ROSUVASTATIN CALCIUM 10 MG/1
10 TABLET, FILM COATED ORAL DAILY
Qty: 30 TABLET | Refills: 3 | Status: SHIPPED | OUTPATIENT
Start: 2018-08-29 | End: 2019-01-02 | Stop reason: SDUPTHER

## 2018-09-28 ENCOUNTER — NURSE ONLY (OUTPATIENT)
Dept: PRIMARY CARE CLINIC | Age: 58
End: 2018-09-28
Payer: COMMERCIAL

## 2018-09-28 DIAGNOSIS — E78.5 HYPERLIPIDEMIA, UNSPECIFIED HYPERLIPIDEMIA TYPE: ICD-10-CM

## 2018-09-28 DIAGNOSIS — Z79.899 MEDICATION MANAGEMENT: ICD-10-CM

## 2018-09-28 DIAGNOSIS — Z12.5 SCREENING FOR PROSTATE CANCER: Primary | ICD-10-CM

## 2018-09-28 DIAGNOSIS — I65.23 BILATERAL CAROTID ARTERY STENOSIS: ICD-10-CM

## 2018-09-28 LAB
ALBUMIN SERPL-MCNC: 4.1 G/DL (ref 3.5–5.2)
ALP BLD-CCNC: 91 U/L (ref 40–130)
ALT SERPL-CCNC: 16 U/L (ref 5–41)
ANION GAP SERPL CALCULATED.3IONS-SCNC: 13 MMOL/L (ref 7–19)
AST SERPL-CCNC: 17 U/L (ref 5–40)
BILIRUB SERPL-MCNC: 0.4 MG/DL (ref 0.2–1.2)
BUN BLDV-MCNC: 9 MG/DL (ref 6–20)
CALCIUM SERPL-MCNC: 9.2 MG/DL (ref 8.6–10)
CHLORIDE BLD-SCNC: 106 MMOL/L (ref 98–111)
CHOLESTEROL, TOTAL: 136 MG/DL (ref 160–199)
CO2: 22 MMOL/L (ref 22–29)
CREAT SERPL-MCNC: 1 MG/DL (ref 0.5–1.2)
GFR NON-AFRICAN AMERICAN: >60
GLUCOSE BLD-MCNC: 86 MG/DL (ref 74–109)
HCT VFR BLD CALC: 46.2 % (ref 42–52)
HDLC SERPL-MCNC: 27 MG/DL (ref 55–121)
HEMOGLOBIN: 15.4 G/DL (ref 14–18)
LDL CHOLESTEROL CALCULATED: 89 MG/DL
MCH RBC QN AUTO: 33 PG (ref 27–31)
MCHC RBC AUTO-ENTMCNC: 33.3 G/DL (ref 33–37)
MCV RBC AUTO: 99.1 FL (ref 80–94)
PDW BLD-RTO: 13.1 % (ref 11.5–14.5)
PLATELET # BLD: 237 K/UL (ref 130–400)
PMV BLD AUTO: 9.9 FL (ref 9.4–12.4)
POTASSIUM SERPL-SCNC: 4.2 MMOL/L (ref 3.5–5)
PROSTATE SPECIFIC ANTIGEN: 0.45 NG/ML (ref 0–4)
RBC # BLD: 4.66 M/UL (ref 4.7–6.1)
SODIUM BLD-SCNC: 141 MMOL/L (ref 136–145)
TOTAL PROTEIN: 6.9 G/DL (ref 6.6–8.7)
TRIGL SERPL-MCNC: 99 MG/DL (ref 0–149)
WBC # BLD: 11.1 K/UL (ref 4.8–10.8)

## 2018-09-28 PROCEDURE — 36415 COLL VENOUS BLD VENIPUNCTURE: CPT | Performed by: NURSE PRACTITIONER

## 2018-09-29 DIAGNOSIS — G44.021 INTRACTABLE CHRONIC CLUSTER HEADACHE: Primary | ICD-10-CM

## 2018-10-01 DIAGNOSIS — G44.021 INTRACTABLE CHRONIC CLUSTER HEADACHE: ICD-10-CM

## 2018-10-01 RX ORDER — ROSUVASTATIN CALCIUM 10 MG/1
10 TABLET, COATED ORAL DAILY
Qty: 30 TABLET | Refills: 3 | Status: SHIPPED | OUTPATIENT
Start: 2018-10-01 | End: 2019-01-02 | Stop reason: SDUPTHER

## 2018-10-01 RX ORDER — SUMATRIPTAN 100 MG/1
TABLET, FILM COATED ORAL
Qty: 9 TABLET | Refills: 5 | Status: SHIPPED | OUTPATIENT
Start: 2018-10-01 | End: 2018-10-04 | Stop reason: SDUPTHER

## 2018-10-01 RX ORDER — BUTALBITAL, ACETAMINOPHEN AND CAFFEINE 50; 325; 40 MG/1; MG/1; MG/1
TABLET ORAL
Qty: 30 TABLET | Refills: 2 | Status: SHIPPED | OUTPATIENT
Start: 2018-10-01 | End: 2018-10-04 | Stop reason: SDUPTHER

## 2018-10-01 RX ORDER — ROSUVASTATIN CALCIUM 10 MG/1
10 TABLET, COATED ORAL DAILY
COMMUNITY
End: 2018-10-01 | Stop reason: SDUPTHER

## 2018-10-04 ENCOUNTER — OFFICE VISIT (OUTPATIENT)
Dept: NEUROLOGY | Age: 58
End: 2018-10-04
Payer: COMMERCIAL

## 2018-10-04 VITALS
HEART RATE: 73 BPM | DIASTOLIC BLOOD PRESSURE: 73 MMHG | SYSTOLIC BLOOD PRESSURE: 119 MMHG | HEIGHT: 73 IN | WEIGHT: 199 LBS | BODY MASS INDEX: 26.37 KG/M2

## 2018-10-04 DIAGNOSIS — G44.021 INTRACTABLE CHRONIC CLUSTER HEADACHE: Primary | ICD-10-CM

## 2018-10-04 PROCEDURE — 99213 OFFICE O/P EST LOW 20 MIN: CPT | Performed by: PSYCHIATRY & NEUROLOGY

## 2018-10-04 RX ORDER — BUTALBITAL, ACETAMINOPHEN AND CAFFEINE 50; 325; 40 MG/1; MG/1; MG/1
TABLET ORAL
Qty: 60 TABLET | Refills: 2 | Status: SHIPPED | OUTPATIENT
Start: 2018-10-04 | End: 2019-04-22

## 2018-10-04 RX ORDER — CEFUROXIME AXETIL 250 MG/1
TABLET ORAL
Qty: 9 CARTRIDGE | Refills: 5 | Status: SHIPPED | OUTPATIENT
Start: 2018-10-04 | End: 2020-03-12 | Stop reason: SDUPTHER

## 2018-10-04 RX ORDER — SUMATRIPTAN 100 MG/1
TABLET, FILM COATED ORAL
Qty: 9 TABLET | Refills: 5 | Status: SHIPPED | OUTPATIENT
Start: 2018-10-04 | End: 2020-03-27

## 2018-10-04 NOTE — PROGRESS NOTES
diarrhea or nausea/vomiting  Genito-Urinary ROS: negative for - hematuria or urinary frequency/urgency  Musculoskeletal ROS: negative for - joint pain, joint stiffness or joint swelling  Neurological ROS: negative for - memory loss, numbness/tingling or weakness     Examination:  Vitals:  /73   Pulse 73   Ht 6' 1\" (1.854 m)   Wt 199 lb (90.3 kg)   BMI 26.25 kg/m²   General appearance:  alert and cooperative with exam  HEENT:  Sclera clear, anicteric, Oropharynx clear, no lesions, Neck supple with midline trachea, Thyroid without masses and Trachea midline  Heart::  regular rate and rhythm, S1, S2 normal, no murmur, click, rub or gallop  No carotid bruits  Lungs:  clear to auscultation bilaterally  Extremities:  extremities normal, atraumatic, no cyanosis or edema  Neurologic:  Extraocular movements are intact without nystagmus.  Visual fields are full to confrontation.  Facial movements are symmetrical and normal.  Speech is precise.  Extremity strength is normal in both uppers and lowers.  Deep tendon reflexes are intact and symmetrical.  Rapid alternating movements are unimpaired.  Finger-to-nose testing is performed well, without dysmetria.  Gait is normal.    Pertinent Diagnostic Studies:  NINA was appropriate    Assessment:       ICD-10-CM ICD-9-CM    1. Intractable chronic cluster headache G44.021 339.02    Stable    Plan:   1. Continue present medications and care  2.  I recommended he stop smoking particularly as this often helps cluster headaches  3. FU in 6 months    Electronically signed by Joy Peña MD on 10/4/2018

## 2019-01-01 DIAGNOSIS — G44.021 INTRACTABLE CHRONIC CLUSTER HEADACHE: ICD-10-CM

## 2019-01-02 RX ORDER — ROSUVASTATIN CALCIUM 10 MG/1
10 TABLET, FILM COATED ORAL DAILY
Qty: 30 TABLET | Refills: 0 | Status: SHIPPED | OUTPATIENT
Start: 2019-01-02 | End: 2019-01-28 | Stop reason: SDUPTHER

## 2019-01-02 RX ORDER — VERAPAMIL HYDROCHLORIDE 240 MG/1
TABLET, FILM COATED, EXTENDED RELEASE ORAL
Qty: 60 TABLET | Refills: 0 | Status: SHIPPED | OUTPATIENT
Start: 2019-01-02 | End: 2019-01-27 | Stop reason: SDUPTHER

## 2019-01-27 DIAGNOSIS — G44.021 INTRACTABLE CHRONIC CLUSTER HEADACHE: ICD-10-CM

## 2019-01-28 RX ORDER — ROSUVASTATIN CALCIUM 10 MG/1
10 TABLET, FILM COATED ORAL DAILY
Qty: 30 TABLET | Refills: 3 | Status: SHIPPED | OUTPATIENT
Start: 2019-01-28 | End: 2019-05-31 | Stop reason: SDUPTHER

## 2019-01-28 RX ORDER — VERAPAMIL HYDROCHLORIDE 240 MG/1
TABLET, FILM COATED, EXTENDED RELEASE ORAL
Qty: 60 TABLET | Refills: 5 | Status: SHIPPED | OUTPATIENT
Start: 2019-01-28 | End: 2019-07-30 | Stop reason: SDUPTHER

## 2019-01-31 ENCOUNTER — TELEPHONE (OUTPATIENT)
Dept: NEUROLOGY | Age: 59
End: 2019-01-31

## 2019-01-31 DIAGNOSIS — G44.001 CLUSTER HEADACHE, INTRACTABLE: Primary | ICD-10-CM

## 2019-01-31 RX ORDER — PREDNISONE 20 MG/1
TABLET ORAL
Qty: 20 TABLET | Refills: 0 | Status: SHIPPED | OUTPATIENT
Start: 2019-01-31 | End: 2019-08-02

## 2019-01-31 RX ORDER — HYDROCODONE BITARTRATE AND ACETAMINOPHEN 5; 325 MG/1; MG/1
1 TABLET ORAL EVERY 6 HOURS PRN
Qty: 28 TABLET | Refills: 0 | Status: SHIPPED | OUTPATIENT
Start: 2019-01-31 | End: 2019-02-07

## 2019-03-12 ENCOUNTER — TELEPHONE (OUTPATIENT)
Dept: NEUROLOGY | Age: 59
End: 2019-03-12

## 2019-03-12 ENCOUNTER — OFFICE VISIT (OUTPATIENT)
Dept: NEUROLOGY | Age: 59
End: 2019-03-12
Payer: COMMERCIAL

## 2019-03-12 VITALS
WEIGHT: 206 LBS | HEART RATE: 70 BPM | HEIGHT: 73 IN | DIASTOLIC BLOOD PRESSURE: 71 MMHG | SYSTOLIC BLOOD PRESSURE: 116 MMHG | BODY MASS INDEX: 27.3 KG/M2 | OXYGEN SATURATION: 100 %

## 2019-03-12 DIAGNOSIS — G44.021 INTRACTABLE CHRONIC CLUSTER HEADACHE: Primary | ICD-10-CM

## 2019-03-12 PROCEDURE — 96372 THER/PROPH/DIAG INJ SC/IM: CPT | Performed by: PHYSICIAN ASSISTANT

## 2019-03-12 PROCEDURE — 99214 OFFICE O/P EST MOD 30 MIN: CPT | Performed by: PHYSICIAN ASSISTANT

## 2019-03-12 RX ORDER — METHYLPREDNISOLONE ACETATE 80 MG/ML
80 INJECTION, SUSPENSION INTRA-ARTICULAR; INTRALESIONAL; INTRAMUSCULAR; SOFT TISSUE ONCE
Status: COMPLETED | OUTPATIENT
Start: 2019-03-12 | End: 2019-03-12

## 2019-03-12 RX ADMIN — METHYLPREDNISOLONE ACETATE 80 MG: 80 INJECTION, SUSPENSION INTRA-ARTICULAR; INTRALESIONAL; INTRAMUSCULAR; SOFT TISSUE at 13:05

## 2019-03-15 ENCOUNTER — TELEPHONE (OUTPATIENT)
Dept: VASCULAR SURGERY | Age: 59
End: 2019-03-15

## 2019-03-15 DIAGNOSIS — I65.23 BILATERAL CAROTID ARTERY STENOSIS: Primary | ICD-10-CM

## 2019-04-11 ENCOUNTER — HOSPITAL ENCOUNTER (OUTPATIENT)
Dept: VASCULAR LAB | Age: 59
Discharge: HOME | End: 2019-04-11
Payer: COMMERCIAL

## 2019-04-11 ENCOUNTER — OFFICE VISIT (OUTPATIENT)
Dept: VASCULAR SURGERY | Age: 59
End: 2019-04-11
Payer: COMMERCIAL

## 2019-04-11 VITALS
DIASTOLIC BLOOD PRESSURE: 71 MMHG | TEMPERATURE: 97.9 F | SYSTOLIC BLOOD PRESSURE: 116 MMHG | OXYGEN SATURATION: 98 % | HEART RATE: 56 BPM | RESPIRATION RATE: 18 BRPM

## 2019-04-11 DIAGNOSIS — I65.23 BILATERAL CAROTID ARTERY STENOSIS: ICD-10-CM

## 2019-04-11 DIAGNOSIS — I65.23 BILATERAL CAROTID ARTERY STENOSIS: Primary | ICD-10-CM

## 2019-04-11 PROCEDURE — 99212 OFFICE O/P EST SF 10 MIN: CPT | Performed by: NURSE PRACTITIONER

## 2019-04-11 PROCEDURE — 93880 EXTRACRANIAL BILAT STUDY: CPT

## 2019-04-11 NOTE — PROGRESS NOTES
Patient Care Team:  GUILHERME Kinney - CNP as PCP - General (Nurse Practitioner)  Rick Bell MD as Neurologist (Neurology)      Subjective    He presents for follow-up of carotid artery stenosis. He has prior history of carotid occlusive disease for 1 - 5 years. His current treatment includes ASA EC daily. He denies a history of CVA. He reports no TIA's, episodes of amaurosis fugax and episodes of lateralizing weakness. Edita Cuevas is a 63 y.o. male with the following [facility]:  Patient Active Problem List    Diagnosis Date Noted    History of adenomatous polyp of colon 03/05/2018    Cluster headache, intractable     Intractable chronic cluster headache 05/19/2016    Migraine headache     Carotid artery stenosis     Colon cancer screening 03/12/2013    Hyperlipidemia 03/12/2013    Bilateral carotid artery stenosis 03/12/2013    BPH (benign prostatic hyperplasia) 03/12/2013    Migraines 03/12/2013    Nicotine dependence 03/12/2013     Current Outpatient Medications   Medication Sig Dispense Refill    predniSONE (DELTASONE) 20 MG tablet 3 PO q am for 3 days, then 2 PO q am for 3 days, then 1 PO q am for 3 days, then 1/2 PO q am for 3 days 20 tablet 0    verapamil (CALAN SR) 240 MG extended release tablet TAKE 1 TABLET BY MOUTH TWICE DAILY 60 tablet 5    CRESTOR 10 MG tablet TAKE 1 TABLET BY MOUTH DAILY 30 tablet 3    butalbital-acetaminophen-caffeine (FIORICET, ESGIC) -40 MG per tablet TAKE 1 TABLET BY MOUTH TWICE DAILY AS NEEDED FOR HEADACHE 60 tablet 2    SUMAtriptan (IMITREX) 100 MG tablet As directed 9 tablet 5    SUMAtriptan Succinate 6 MG/0.5ML SOAJ INJECT 1 SYRINGE AT ONSET OF A HEADACHE, MAY REPEAT IN 2 HOURS IF HEADACHE PERSISTS.  MAX OF 2 IN 24 HOURS OR 4 WEEKLY 9 Cartridge 5    fluticasone (FLONASE) 50 MCG/ACT nasal spray 1 spray by Nasal route daily During spring and fall 1 Bottle 3    melatonin 3 MG TABS tablet Take 3 mg by mouth daily Indications: 2 PO QHS      sildenafil (VIAGRA) 50 MG tablet Take 1 tablet by mouth as needed for Erectile Dysfunction 12 tablet 3    aspirin 81 MG tablet Take 81 mg by mouth daily        No current facility-administered medications for this visit. Current Outpatient Medications on File Prior to Visit   Medication Sig Dispense Refill    predniSONE (DELTASONE) 20 MG tablet 3 PO q am for 3 days, then 2 PO q am for 3 days, then 1 PO q am for 3 days, then 1/2 PO q am for 3 days 20 tablet 0    verapamil (CALAN SR) 240 MG extended release tablet TAKE 1 TABLET BY MOUTH TWICE DAILY 60 tablet 5    CRESTOR 10 MG tablet TAKE 1 TABLET BY MOUTH DAILY 30 tablet 3    butalbital-acetaminophen-caffeine (FIORICET, ESGIC) -40 MG per tablet TAKE 1 TABLET BY MOUTH TWICE DAILY AS NEEDED FOR HEADACHE 60 tablet 2    SUMAtriptan (IMITREX) 100 MG tablet As directed 9 tablet 5    SUMAtriptan Succinate 6 MG/0.5ML SOAJ INJECT 1 SYRINGE AT ONSET OF A HEADACHE, MAY REPEAT IN 2 HOURS IF HEADACHE PERSISTS. MAX OF 2 IN 24 HOURS OR 4 WEEKLY 9 Cartridge 5    fluticasone (FLONASE) 50 MCG/ACT nasal spray 1 spray by Nasal route daily During spring and fall 1 Bottle 3    melatonin 3 MG TABS tablet Take 3 mg by mouth daily Indications: 2 PO QHS      sildenafil (VIAGRA) 50 MG tablet Take 1 tablet by mouth as needed for Erectile Dysfunction 12 tablet 3    aspirin 81 MG tablet Take 81 mg by mouth daily        No current facility-administered medications on file prior to visit. Allergies: Patient has no known allergies.   Past Medical History:   Diagnosis Date    BPH (benign prostatic hyperplasia)     Carotid artery stenosis     Cluster headache, intractable     Hyperlipidemia     Migraine headache     Other malaise and fatigue      Past Surgical History:   Procedure Laterality Date    BACK SURGERY      COLONOSCOPY  03/15/2013    Pamela: adenomatous and hyperplastic polyps (5 yr)    HERNIA REPAIR  MT COLONOSCOPY FLX DX W/COLLJ SPEC WHEN PFRMD N/A 4/20/2018    Dr Ajay Cuevas, 5 yr recall     Family History   Problem Relation Age of Onset    Cancer Mother         Lung CA    Cancer Father         Breast CA    Cancer Sister         breast cancer    Cancer Other 58    Cancer Other 59     Social History     Tobacco Use    Smoking status: Current Every Day Smoker     Packs/day: 1.00     Years: 35.00     Pack years: 35.00     Types: Cigarettes    Smokeless tobacco: Never Used    Tobacco comment: Patient states that he is trying to change his bad habits. Substance Use Topics    Alcohol use: No     Alcohol/week: 0.0 oz           Review of Systems    Constitutional - no significant activity change, appetite change, or unexpected weight change. No fever or chills. No diaphoresis or significant fatigue. HENT - no significant rhinorrhea or epistaxis. No tinnitus or significant hearing loss. Eyes - no sudden vision change or amaurosis. Respiratory - no significant shortness of breath, wheezing, or stridor. No apnea, cough, or chest tightness associated with shortness of breath. Cardiovascular - no chest pain, syncope, or significant dizziness. No palpitations or significant leg swelling. No claudication. Gastrointestinal - no abdominal swelling or pain. No blood in stool. No severe constipation, diarrhea, nausea, or vomiting. Genitourinary - No difficulty urinating, dysuria, frequency, or urgency. No flank pain or hematuria. Musculoskeletal - no back pain, gait disturbance, or myalgia. Skin - no color change, rash, pallor, or new wound. Neurologic - no dizziness, facial asymmetry, or light headedness. No seizures. No speech difficulty or lateralizing weakness. Hematologic - no easy bruising or excessive bleeding. Psychiatric - no severe anxiety or nervousness. No confusion. All other review of systems are negative.     Physical Exam    Vitals:    04/11/19 1336 04/11/19 1339   BP: 122/74 116/71   Pulse: 56 56   Resp: 18 18   Temp: 97.9 °F (36.6 °C)    SpO2: 98%          Constitutional - well developed, well nourished. No diaphoresis or acute distress. HENT - head normocephalic. Right external ear canal appears normal.  Left external ear canal appears normal.  Septum appears midline. Eyes - conjunctiva normal.  EOMS normal.  No exudate. No icterus. Neck- ROM appears normal, no tracheal deviation. Cardiovascular - Regular rate and rhythm. Heart sounds are normal.  No murmur, rub, or gallop. Carotid pulses are 2+ to palpation bilaterally without bruit. Pulmonary - effort appears normal.  No respiratory distress. Lungs - Breath sounds normal. No wheezes or rales. Extremities - Radial and brachial pulses are 2+ to palpation bilaterally. Neurologic - alert and oriented X 3. Physiologic. Face symmetric. Skin - warm, dry, and intact. No rash, erythema, or pallor. Psychiatric - mood, affect, and behavior appear normal.  Judgment and thought processes appear normal.    Risk factors for atherosclerosis of all vascular beds have been reviewed with the patient including:  Family history, tobacco abuse in all forms, elevated cholesterol, hyperlipidemia, and diabetes. Doppler results:    Right CCA/ICA <50% stenotic  Left CCA/ICA <50% stenotic  Right vertebral artery flow is antegrade  Left vertebral artery flow is antegrade  Individual velocities reviewed: Yes. Test results were reviewed with the patient. Disease process is stable      Options have been discussed with the patient including continued medical management. Patient has opted to proceed with continued medical management. Assessment    1.  Bilateral carotid artery stenosis          Plan    Start/Continue ASA EC 81 mg daily  Follow up in  12  Month(s)  Strongly encourage start/continue statin therapy  Recommend no smoking  Patient instructed to call or proceed to the emergency room with any symptoms of lateralizing weakness, loss of vision in one eye, or episodes slurred speech.

## 2019-04-16 DIAGNOSIS — G44.021 INTRACTABLE CHRONIC CLUSTER HEADACHE: ICD-10-CM

## 2019-04-17 NOTE — TELEPHONE ENCOUNTER
Requested Prescriptions     Pending Prescriptions Disp Refills    butalbital-acetaminophen-caffeine (FIORICET, ESGIC) -40 MG per tablet [Pharmacy Med Name: BUTALBITAL/ACETAMINOPHEN/CAFF TABS] 30 tablet 0     Sig: TAKE 1 TABLET BY MOUTH TWICE DAILY AS NEEDED FOR HEADACHE     Last OV  3/12/19  Next OV  9/12/19  Last Rx  10/4/18 with 2 refills  Catrachito Mast   3/10/19

## 2019-04-22 RX ORDER — BUTALBITAL, ACETAMINOPHEN AND CAFFEINE 50; 325; 40 MG/1; MG/1; MG/1
TABLET ORAL
Qty: 30 TABLET | Refills: 2 | Status: SHIPPED | OUTPATIENT
Start: 2019-04-22 | End: 2019-09-25 | Stop reason: SDUPTHER

## 2019-06-03 RX ORDER — ROSUVASTATIN CALCIUM 10 MG/1
10 TABLET, FILM COATED ORAL DAILY
Qty: 30 TABLET | Refills: 0 | Status: SHIPPED | OUTPATIENT
Start: 2019-06-03 | End: 2019-08-02

## 2019-08-02 ENCOUNTER — OFFICE VISIT (OUTPATIENT)
Dept: PRIMARY CARE CLINIC | Age: 59
End: 2019-08-02
Payer: COMMERCIAL

## 2019-08-02 VITALS
TEMPERATURE: 97.9 F | HEIGHT: 73 IN | HEART RATE: 68 BPM | DIASTOLIC BLOOD PRESSURE: 62 MMHG | WEIGHT: 195.6 LBS | RESPIRATION RATE: 16 BRPM | SYSTOLIC BLOOD PRESSURE: 110 MMHG | BODY MASS INDEX: 25.92 KG/M2 | OXYGEN SATURATION: 98 %

## 2019-08-02 DIAGNOSIS — N40.1 BENIGN PROSTATIC HYPERPLASIA WITH LOWER URINARY TRACT SYMPTOMS, SYMPTOM DETAILS UNSPECIFIED: ICD-10-CM

## 2019-08-02 DIAGNOSIS — Z79.899 MEDICATION MANAGEMENT: ICD-10-CM

## 2019-08-02 DIAGNOSIS — Z13.220 ENCOUNTER FOR SCREENING FOR LIPID DISORDER: ICD-10-CM

## 2019-08-02 DIAGNOSIS — Z12.5 SCREENING FOR PROSTATE CANCER: ICD-10-CM

## 2019-08-02 DIAGNOSIS — G44.021 INTRACTABLE CHRONIC CLUSTER HEADACHE: ICD-10-CM

## 2019-08-02 DIAGNOSIS — Z13.1 SCREENING FOR DIABETES MELLITUS: ICD-10-CM

## 2019-08-02 DIAGNOSIS — Z00.00 WELL ADULT HEALTH CHECK: Primary | ICD-10-CM

## 2019-08-02 DIAGNOSIS — I65.23 BILATERAL CAROTID ARTERY STENOSIS: ICD-10-CM

## 2019-08-02 DIAGNOSIS — E78.5 HYPERLIPIDEMIA, UNSPECIFIED HYPERLIPIDEMIA TYPE: ICD-10-CM

## 2019-08-02 DIAGNOSIS — Z87.891 PERSONAL HISTORY OF TOBACCO USE: ICD-10-CM

## 2019-08-02 LAB
ALBUMIN SERPL-MCNC: 4.4 G/DL (ref 3.5–5.2)
ALP BLD-CCNC: 109 U/L (ref 40–130)
ALT SERPL-CCNC: 17 U/L (ref 5–41)
ANION GAP SERPL CALCULATED.3IONS-SCNC: 11 MMOL/L (ref 7–19)
AST SERPL-CCNC: 17 U/L (ref 5–40)
BILIRUB SERPL-MCNC: 0.4 MG/DL (ref 0.2–1.2)
BUN BLDV-MCNC: 10 MG/DL (ref 6–20)
CALCIUM SERPL-MCNC: 9.2 MG/DL (ref 8.6–10)
CHLORIDE BLD-SCNC: 108 MMOL/L (ref 98–111)
CHOLESTEROL, TOTAL: 162 MG/DL (ref 160–199)
CO2: 23 MMOL/L (ref 22–29)
CREAT SERPL-MCNC: 0.9 MG/DL (ref 0.5–1.2)
GFR NON-AFRICAN AMERICAN: >60
GLUCOSE BLD-MCNC: 86 MG/DL (ref 74–109)
HDLC SERPL-MCNC: 30 MG/DL (ref 55–121)
LDL CHOLESTEROL CALCULATED: 110 MG/DL
POTASSIUM SERPL-SCNC: 4.4 MMOL/L (ref 3.5–5)
PROSTATE SPECIFIC ANTIGEN: 0.44 NG/ML (ref 0–4)
SODIUM BLD-SCNC: 142 MMOL/L (ref 136–145)
TOTAL PROTEIN: 6.8 G/DL (ref 6.6–8.7)
TRIGL SERPL-MCNC: 109 MG/DL (ref 0–149)

## 2019-08-02 PROCEDURE — G0296 VISIT TO DETERM LDCT ELIG: HCPCS | Performed by: NURSE PRACTITIONER

## 2019-08-02 PROCEDURE — 36415 COLL VENOUS BLD VENIPUNCTURE: CPT | Performed by: NURSE PRACTITIONER

## 2019-08-02 PROCEDURE — 99396 PREV VISIT EST AGE 40-64: CPT | Performed by: NURSE PRACTITIONER

## 2019-08-02 RX ORDER — SILDENAFIL 50 MG/1
50 TABLET, FILM COATED ORAL PRN
Qty: 12 TABLET | Refills: 3 | Status: SHIPPED | OUTPATIENT
Start: 2019-08-02 | End: 2020-12-22

## 2019-08-02 RX ORDER — ROSUVASTATIN CALCIUM 10 MG/1
10 TABLET, FILM COATED ORAL DAILY
Qty: 90 TABLET | Refills: 3 | Status: SHIPPED | OUTPATIENT
Start: 2019-08-02 | End: 2019-12-02 | Stop reason: SDUPTHER

## 2019-08-02 RX ORDER — VERAPAMIL HYDROCHLORIDE 240 MG/1
TABLET, FILM COATED, EXTENDED RELEASE ORAL
Qty: 180 TABLET | Refills: 3 | Status: SHIPPED | OUTPATIENT
Start: 2019-08-02 | End: 2020-08-17

## 2019-08-02 ASSESSMENT — ENCOUNTER SYMPTOMS
RESPIRATORY NEGATIVE: 1
ALLERGIC/IMMUNOLOGIC NEGATIVE: 1
EYES NEGATIVE: 1
GASTROINTESTINAL NEGATIVE: 1

## 2019-08-02 ASSESSMENT — PATIENT HEALTH QUESTIONNAIRE - PHQ9
1. LITTLE INTEREST OR PLEASURE IN DOING THINGS: 0
SUM OF ALL RESPONSES TO PHQ9 QUESTIONS 1 & 2: 0
2. FEELING DOWN, DEPRESSED OR HOPELESS: 0
SUM OF ALL RESPONSES TO PHQ QUESTIONS 1-9: 0
SUM OF ALL RESPONSES TO PHQ QUESTIONS 1-9: 0

## 2019-08-02 NOTE — PROGRESS NOTES
[address] [facility] [address] [place] [address] Sammie Eugene [address]  Dept: [phone]  Dept Fax: [phone]  Loc: [phone]    Edgar Shaikh is a 63 y.o. male who presents today for his medical conditions/complaints as noted below. Edgar Shaikh is c/o of Annual Exam (patient presents today for his annual physical and fasting labs.)        HPI:     HPI   Chief Complaint   Patient presents with    Annual Exam     patient presents today for his annual physical and fasting labs. He does see neurology for his chronic headaches and he sees vascular for checks on his bilateral carotid stenosis. He is having some burning on his feet at times it is random sometimes during the day sometimes at night. It feels like he is walking on hot pavement. He denies any burning or tingling down his legs. He denies any pain in his legs. He has had some back problems in the past.  He denies any current back pain. He denies any chest pain or shortness of breath. He is still smoking he has been smoking for over 30 years. Is not ready to quit.     Past Medical History:   Diagnosis Date    BPH (benign prostatic hyperplasia)     Carotid artery stenosis     Cluster headache, intractable     Hyperlipidemia     Migraine headache     Other malaise and fatigue       Past Surgical History:   Procedure Laterality Date    BACK SURGERY      COLONOSCOPY  03/15/2013    Pamela: adenomatous and hyperplastic polyps (5 yr)    HERNIA REPAIR      [place] COLONOSCOPY FLX DX W/COLLJ SPEC WHEN PFRMD N/A 4/20/2018    Dr Sivan Rachel, 5 yr recall       Vitals 8/2/2019 4/11/2019 4/11/2019 3/12/2019 10/4/2018 4/78/1565   SYSTOLIC 632 [phone]   DIASTOLIC 62 71 74 71 73 68   Pulse 68 56 56 70 73 97   Temp 97.9 - 97.9 - - 97.8   Resp 16 18 18 - - 16   SpO2 98 - 98 100 - 98   Weight 195 lb 9.6 oz - - 206 lb 199 lb 201 lb   Height 6' 1\" - - 6' 1\" 6' 1\" 6' 1\"   BMI (wt*703/ht~2) 25.8 kg/m2 - - 27.18 kg/m2

## 2019-08-09 ENCOUNTER — HOSPITAL ENCOUNTER (OUTPATIENT)
Dept: CT IMAGING | Age: 59
Discharge: HOME | End: 2019-08-09
Payer: COMMERCIAL

## 2019-08-09 DIAGNOSIS — Z87.891 PERSONAL HISTORY OF TOBACCO USE: ICD-10-CM

## 2019-08-09 PROCEDURE — G0297 LDCT FOR LUNG CA SCREEN: HCPCS

## 2019-08-19 DIAGNOSIS — R91.8 PULMONARY NODULES: Primary | ICD-10-CM

## 2019-08-19 DIAGNOSIS — Z87.891 PERSONAL HISTORY OF TOBACCO USE: ICD-10-CM

## 2019-09-12 ENCOUNTER — OFFICE VISIT (OUTPATIENT)
Dept: NEUROLOGY | Age: 59
End: 2019-09-12
Payer: COMMERCIAL

## 2019-09-12 VITALS
BODY MASS INDEX: 25.82 KG/M2 | RESPIRATION RATE: 14 BRPM | HEIGHT: 73 IN | SYSTOLIC BLOOD PRESSURE: 110 MMHG | WEIGHT: 194.8 LBS | HEART RATE: 69 BPM | DIASTOLIC BLOOD PRESSURE: 63 MMHG

## 2019-09-12 DIAGNOSIS — G60.9 IDIOPATHIC PERIPHERAL NEUROPATHY: ICD-10-CM

## 2019-09-12 DIAGNOSIS — G44.021 INTRACTABLE CHRONIC CLUSTER HEADACHE: Primary | ICD-10-CM

## 2019-09-12 PROCEDURE — 99214 OFFICE O/P EST MOD 30 MIN: CPT | Performed by: PSYCHIATRY & NEUROLOGY

## 2019-09-12 NOTE — PROGRESS NOTES
[facility]  [address] [place] [address] [address], [address]  [place], [place]  Phone [phone]  Fax [phone]     [facility] Follow Up Encounter  2019 2:20 PM    Information:   Patient Name: Sandrine Greco  :   1960  Age:   63 y.o. MRN:   [id number]  Account #:  [de-identified]  Today:  19    Provider: Earnestine Carrion M.D. Chief Complaint:   Chief Complaint   Patient presents with    6 Month Follow-Up       Subjective:   Sandrine Greco is a 63 y.o. man  with a history of cluster headaches who is following up. [facility] tends to have clusters in the spring and fall. [facility] has occasional similar headaches in between. [place] almost always wake him from sleep, a couple hours after going to sleep.  The oxygen is working very well. It gets rid of them in a minute or so. He has had some burning in his feet. He has numbness and tingling. He has no numbness in his hands. He has no back pain or sciatica. He has not noted aggravating or alleviating factors but it seems to get worse when he takes his shoes off. Objective:     Past Medical History:  Past Medical History:   Diagnosis Date    BPH (benign prostatic hyperplasia)     Carotid artery stenosis     Cluster headache, intractable     Hyperlipidemia     Migraine headache     Other malaise and fatigue        Past Surgical History:   Procedure Laterality Date    BACK SURGERY      COLONOSCOPY  03/15/2013    Pamela: adenomatous and hyperplastic polyps (5 yr)    HERNIA REPAIR      [place] COLONOSCOPY FLX DX W/COLLJ SPEC WHEN PFRMD N/A 2018    Dr Donovan Barber, 5 yr recall       Recent Hospitalizations  · None    Significant Injuries  · None    Habits  Sandrine Greco reports that he has been smoking cigarettes. He has a 35.00 pack-year smoking history. He has never used smokeless tobacco. He reports that he does not drink alcohol or use drugs.     Family History   Problem Relation Age of Onset    Cancer Mother         Lung CA    Cancer Father Breast CA    Cancer Sister         breast cancer    Cancer Other 58    Cancer Other 59       Social History  Petar Liu is , lives in [address] and works at [employer]. Medications:  Current Outpatient Medications   Medication Sig Dispense Refill    sildenafil (VIAGRA) 50 MG tablet Take 1 tablet by mouth as needed for Erectile Dysfunction 12 tablet 3    verapamil (CALAN SR) 240 MG extended release tablet TAKE 1 TABLET BY MOUTH TWICE DAILY 180 tablet 3    CRESTOR 10 MG tablet Take 1 tablet by mouth daily 90 tablet 3    butalbital-acetaminophen-caffeine (FIORICET, ESGIC) -40 MG per tablet TAKE 1 TABLET BY MOUTH TWICE DAILY AS NEEDED FOR HEADACHE 30 tablet 2    SUMAtriptan (IMITREX) 100 MG tablet As directed 9 tablet 5    SUMAtriptan Succinate 6 MG/0.5ML SOAJ INJECT 1 SYRINGE AT ONSET OF A HEADACHE, MAY REPEAT IN 2 HOURS IF HEADACHE PERSISTS. MAX OF 2 IN 24 HOURS OR 4 WEEKLY 9 Cartridge 5    fluticasone (FLONASE) 50 MCG/ACT nasal spray 1 spray by Nasal route daily During spring and fall 1 Bottle 3    melatonin 3 MG TABS tablet Take 3 mg by mouth daily Indications: 2 PO QHS      aspirin 81 MG tablet Take 81 mg by mouth daily        No current facility-administered medications for this visit. Allergies:   Allergies as of 09/12/2019    (No Known Allergies)       Review of Systems:  General ROS: negative for - chills or fever  Psychological ROS: negative for - anxiety or depression  ENT ROS: positive for - headaches or sinus pain  Hematological and Lymphatic ROS: negative for - bleeding problems, bruising or swollen lymph nodes  Respiratory ROS: negative for - cough, hemoptysis or shortness of breath  Cardiovascular ROS: negative for - chest pain or palpitations  Gastrointestinal ROS: negative for - blood in stools, constipation, diarrhea or nausea/vomiting  Genito-Urinary ROS: negative for - hematuria or urinary frequency/urgency  Musculoskeletal ROS: negative no stenosis of the left internal carotid artery.    There is normal antegrade flow in the bilateral vertebral artery(ies).        Signature        ----------------------------------------------------------------    Electronically signed by Ana Red MD(Interpreting    physician) on 04/11/2019 07:17 PM     Narrative   CT LUNG SCREENING (INITIAL/ANNUAL) 8/9/2019 8:27 AM   HISTORY: Lung cancer screening   COMPARISON: None   DOSE LENGTH PRODUCT: 112 mGy cm. Automated exposure control was also   utilized to decrease patient radiation dose. TECHNIQUE: Noncontrast CT of the chest was performed using low-dose   protocol. FINDINGS:    Nodules: On image 206 a 7 mm nodule is present left lung base. On   image 183 a 7 mm nodule is present in the right lung. On image 97 a groundglass densities present in the left upper lung. Other lung findings: Centrilobular emphysema is noted small blebs are   present throughout the right and left lung. Airway: The airway is patent. Pleura: A 3 mm pleural nodule is present on image 152. Aorta and great vessels: There is no evidence of an aneurysm. The   great vessels have a normal origin. Heart and pericardium: Cardiac silhouettes normal in size. Coronary   calcifications are present. Lymph nodes: No pathologic lymphadenopathy is noted. Bones and soft tissues: No acute osseous or soft tissue abnormality is   seen. Upper abdomen: Nonobstructing renal calculi are present       Impression   1. COPD. 2. 7 mm nodules present in each lung. Bilateral nephrolithiasis   2. Using Lung Rads version 1.1 the Lung Rads score is3   6 month follow-up low-dose CT to evaluate stability. Signed by Dr Pb Shipley on 8/9/2019 9:57 AM       Assessment:       ICD-10-CM    1. Intractable chronic cluster headache G44.021    2. Idiopathic peripheral neuropathy G60.9 CBC Auto Differential     Vitamin B12     Folate     TSH without Reflex     Monoclonal Protein Detection Quant.  Ser B. Burgdorferi Antibodies     Sedimentation Rate     GLADYS Screen with Reflex   His cluster headaches are doing well with the addition of PRN oxygen. He has developed symptoms suggestive of a peripheral neuropathy. Plan:   1. Labs  2. Continue same medications and oxygen  3. Continue Verapamil and PRN oxygen  4. Advised smoking cessation  5.  Consider NCS/EMG if symptoms worsen  6. FU in 6 months    Electronically signed by Melania Wells MD on 9/12/2019

## 2019-09-17 DIAGNOSIS — G60.9 IDIOPATHIC PERIPHERAL NEUROPATHY: ICD-10-CM

## 2019-09-17 LAB
BASOPHILS ABSOLUTE: 0.1 K/UL (ref 0–0.2)
BASOPHILS RELATIVE PERCENT: 0.7 % (ref 0–1)
EOSINOPHILS ABSOLUTE: 0.3 K/UL (ref 0–0.6)
EOSINOPHILS RELATIVE PERCENT: 2.5 % (ref 0–5)
FOLATE: 9.3 NG/ML (ref 4.5–32.2)
HCT VFR BLD CALC: 47.2 % (ref 42–52)
HEMOGLOBIN: 15.7 G/DL (ref 14–18)
IMMATURE GRANULOCYTES #: 0 K/UL
LYMPHOCYTES ABSOLUTE: 4 K/UL (ref 1.1–4.5)
LYMPHOCYTES RELATIVE PERCENT: 37.5 % (ref 20–40)
MCH RBC QN AUTO: 33.1 PG (ref 27–31)
MCHC RBC AUTO-ENTMCNC: 33.3 G/DL (ref 33–37)
MCV RBC AUTO: 99.4 FL (ref 80–94)
MONOCYTES ABSOLUTE: 0.9 K/UL (ref 0–0.9)
MONOCYTES RELATIVE PERCENT: 8.1 % (ref 0–10)
NEUTROPHILS ABSOLUTE: 5.4 K/UL (ref 1.5–7.5)
NEUTROPHILS RELATIVE PERCENT: 51 % (ref 50–65)
PDW BLD-RTO: 13.4 % (ref 11.5–14.5)
PLATELET # BLD: 253 K/UL (ref 130–400)
PMV BLD AUTO: 9.7 FL (ref 9.4–12.4)
RBC # BLD: 4.75 M/UL (ref 4.7–6.1)
SEDIMENTATION RATE, ERYTHROCYTE: 13 MM/HR (ref 0–15)
TSH SERPL DL<=0.05 MIU/L-ACNC: 2.05 UIU/ML (ref 0.27–4.2)
VITAMIN B-12: 347 PG/ML (ref 211–946)
WBC # BLD: 10.6 K/UL (ref 4.8–10.8)

## 2019-09-19 LAB — ANA IGG, ELISA: NORMAL

## 2019-09-20 LAB — LYME, EIA: 0.33 LIV (ref 0–1.2)

## 2019-09-21 LAB
+IMM: NORMAL
ALBUMIN SERPL-MCNC: 4.08 G/DL (ref 3.75–5.01)
ALPHA-1-GLOBULIN: 0.29 G/DL (ref 0.19–0.46)
ALPHA-2-GLOBULIN: 0.78 G/DL (ref 0.48–1.05)
BETA GLOBULIN: 0.89 G/DL (ref 0.48–1.1)
GAMMA GLOBULIN: 0.76 G/DL (ref 0.62–1.51)
IGA: 316 MG/DL (ref 68–408)
IGG: 793 MG/DL (ref 768–1632)
IGM: 80 MG/DL (ref 35–263)
SPE/IFE INTERPRETATION: NORMAL
TOTAL PROTEIN: 6.8 G/DL (ref 6.3–8.2)

## 2019-09-27 ENCOUNTER — TELEPHONE (OUTPATIENT)
Dept: NEUROLOGY | Age: 59
End: 2019-09-27

## 2019-12-02 RX ORDER — ROSUVASTATIN CALCIUM 10 MG/1
TABLET, COATED ORAL
Qty: 30 TABLET | Refills: 3 | Status: SHIPPED | OUTPATIENT
Start: 2019-12-02 | End: 2020-01-24 | Stop reason: SDUPTHER

## 2019-12-02 RX ORDER — ROSUVASTATIN CALCIUM 10 MG/1
10 TABLET, FILM COATED ORAL DAILY
Qty: 30 TABLET | Refills: 0 | Status: SHIPPED | OUTPATIENT
Start: 2019-12-02 | End: 2020-01-24 | Stop reason: SDUPTHER

## 2019-12-02 RX ORDER — ROSUVASTATIN CALCIUM 10 MG/1
10 TABLET, FILM COATED ORAL DAILY
Qty: 30 TABLET | Refills: 0 | Status: SHIPPED | OUTPATIENT
Start: 2019-12-02 | End: 2020-01-09

## 2020-01-09 RX ORDER — ROSUVASTATIN CALCIUM 10 MG/1
10 TABLET, FILM COATED ORAL DAILY
Qty: 30 TABLET | Refills: 3 | Status: SHIPPED | OUTPATIENT
Start: 2020-01-09 | End: 2020-01-24 | Stop reason: SDUPTHER

## 2020-01-24 ENCOUNTER — TELEPHONE (OUTPATIENT)
Dept: PRIMARY CARE CLINIC | Age: 60
End: 2020-01-24

## 2020-01-24 RX ORDER — ROSUVASTATIN CALCIUM 10 MG/1
10 TABLET, COATED ORAL DAILY
Qty: 30 TABLET | Refills: 5 | Status: SHIPPED | OUTPATIENT
Start: 2020-01-24 | End: 2020-03-12

## 2020-01-24 NOTE — TELEPHONE ENCOUNTER
Received a fax from [email or web address] requesting the Crestor to be ordered in generic form. Scanned in media.

## 2020-02-19 ENCOUNTER — HOSPITAL ENCOUNTER (OUTPATIENT)
Dept: CT IMAGING | Age: 60
Discharge: HOME | End: 2020-02-19
Payer: COMMERCIAL

## 2020-02-19 PROCEDURE — 71250 CT THORAX DX C-: CPT

## 2020-02-19 RX ORDER — BUTALBITAL, ACETAMINOPHEN AND CAFFEINE 50; 325; 40 MG/1; MG/1; MG/1
TABLET ORAL
Qty: 30 TABLET | Refills: 2 | Status: SHIPPED | OUTPATIENT
Start: 2020-02-19 | End: 2020-08-03

## 2020-03-11 ENCOUNTER — OFFICE VISIT (OUTPATIENT)
Dept: PULMONOLOGY | Facility: CLINIC | Age: 60
End: 2020-03-11

## 2020-03-11 VITALS
BODY MASS INDEX: 26.9 KG/M2 | HEIGHT: 73 IN | SYSTOLIC BLOOD PRESSURE: 116 MMHG | HEART RATE: 73 BPM | DIASTOLIC BLOOD PRESSURE: 72 MMHG | OXYGEN SATURATION: 99 % | WEIGHT: 203 LBS

## 2020-03-11 DIAGNOSIS — R91.1 LEFT UPPER LOBE PULMONARY NODULE: Primary | ICD-10-CM

## 2020-03-11 PROCEDURE — 99204 OFFICE O/P NEW MOD 45 MIN: CPT | Performed by: INTERNAL MEDICINE

## 2020-03-11 RX ORDER — CEFUROXIME AXETIL 250 MG/1
TABLET ORAL
COMMUNITY
Start: 2018-10-04

## 2020-03-11 RX ORDER — VERAPAMIL HYDROCHLORIDE 240 MG/1
TABLET, FILM COATED, EXTENDED RELEASE ORAL
COMMUNITY
Start: 2019-08-02

## 2020-03-11 RX ORDER — BUTALBITAL, ACETAMINOPHEN AND CAFFEINE 50; 325; 40 MG/1; MG/1; MG/1
TABLET ORAL
COMMUNITY
Start: 2020-02-19

## 2020-03-11 RX ORDER — ROSUVASTATIN CALCIUM 10 MG/1
10 TABLET, COATED ORAL NIGHTLY
COMMUNITY
Start: 2020-01-24

## 2020-03-11 RX ORDER — SILDENAFIL 50 MG/1
50 TABLET, FILM COATED ORAL
COMMUNITY
Start: 2019-08-02

## 2020-03-11 RX ORDER — SUMATRIPTAN 100 MG/1
TABLET, FILM COATED ORAL
COMMUNITY
Start: 2018-10-04 | End: 2022-09-22 | Stop reason: SDUPTHER

## 2020-03-11 RX ORDER — LANOLIN ALCOHOL/MO/W.PET/CERES
3 CREAM (GRAM) TOPICAL NIGHTLY
COMMUNITY

## 2020-03-11 NOTE — PROGRESS NOTES
Subjective   Carlos Anaya is a 59 y.o. male.     Background: Pt with low dose ct showing 7 mm spiculated nodule in ABDOUL 2/2020 increased when compared to previously 8/9/2019    Chief Complaint   Patient presents with   • Lung Nodule        History of Present Illness   Patient underwent a CT lung screening scan which showed abnormalities and I been asked to see him for this problem.  The patient declines any pulmonary problems and indeed this screening scan was done in an asymptomatic state.  There is no prior history of lung nodules or other lung disease.  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  He had a flu virus at one point in time associated with some wheezing and used an inhaler for that and it did help him at that time but he has not needed inhalers at any other times.  He says he remains active.  He has been an ongoing smoker and I suggested he quit smoking.  I have been asked to see him for further evaluation.    Medical/Family/Social History   has a past medical history of High cholesterol and Migraines.   has a past surgical history that includes Back surgery; Hernia repair; and Colonoscopy.  family history includes Breast cancer in his father; Lung cancer in his mother.   reports that he has been smoking cigarettes. He has a 40.00 pack-year smoking history. He has never used smokeless tobacco. He reports that he drank alcohol. He reports that he does not use drugs.  No Known Allergies  Medications    Current Outpatient Medications:   •  aspirin 81 MG tablet, Take 81 mg by mouth., Disp: , Rfl:   •  butalbital-acetaminophen-caffeine (FIORICET, ESGIC) -40 MG per tablet, TAKE 1 TABLET BY MOUTH TWICE DAILY AS NEEDED FOR HEADACHE, Disp: , Rfl:   •  melatonin 3 MG tablet, Take 3 mg by mouth., Disp: , Rfl:   •  rosuvastatin (CRESTOR) 10 MG tablet, Take 10 mg by mouth., Disp: , Rfl:   •  sildenafil (VIAGRA) 50 MG tablet, Take 50 mg by mouth., Disp: , Rfl:   •   "SUMAtriptan (IMITREX) 100 MG tablet, As directed, Disp: , Rfl:   •  SUMAtriptan Succinate (IMITREX) 6 MG/0.5ML injection, INJECT 1 SYRINGE AT ONSET OF A HEADACHE, MAY REPEAT IN 2 HOURS IF HEADACHE PERSISTS. MAX OF 2 IN 24 HOURS OR 4 WEEKLY, Disp: , Rfl:   •  verapamil SR (CALAN-SR) 240 MG CR tablet, TAKE 1 TABLET BY MOUTH TWICE DAILY, Disp: , Rfl:     Review of Systems   Constitutional: Negative for chills and fever.   HENT: Negative for trouble swallowing and voice change.    Eyes: Negative for photophobia and visual disturbance.   Respiratory: Negative for choking, shortness of breath and wheezing.    Cardiovascular: Negative for chest pain.   Gastrointestinal: Negative for abdominal pain, nausea, vomiting and GERD.   Genitourinary: Negative for difficulty urinating and hematuria.   Musculoskeletal: Negative for gait problem and joint swelling.   Skin: Negative for pallor and skin lesions.   Neurological: Negative for tremors, weakness and confusion.   Hematological: Negative for adenopathy. Does not bruise/bleed easily.   Psychiatric/Behavioral: The patient is not nervous/anxious.        Objective   /72   Pulse 73   Ht 185.4 cm (73\")   Wt 92.1 kg (203 lb)   SpO2 99% Comment: RA  BMI 26.78 kg/m²   Physical Exam   Constitutional: He appears well-developed and well-nourished. He does not appear ill. No distress.   HENT:   Head: Normocephalic and atraumatic.   Nose: Nose normal.   Eyes: Conjunctivae and EOM are normal. No scleral icterus.   Neck: Neck supple.   Cardiovascular: Normal rate, regular rhythm, S1 normal and S2 normal.   Pulmonary/Chest: Effort normal. No respiratory distress. He has no wheezes. He has no rales.   Abdominal: Soft. He exhibits no distension. There is no guarding.   Musculoskeletal: He exhibits no deformity.   Neurological: He is alert.   Skin: Skin is warm and dry. No rash noted.   Psychiatric: He has a normal mood and affect.        ---------------- " -------------------------------------------------------------------------------  Examination. CT CHEST LOW DOSE (LDCT) 2/19/2020 8:25 AM  History: Pulmonary nodules. History of tobacco use.  DLP: 110 mGycm.  The low-dose screening CT scan of the chest is performed without  intravenous contrast. In coronal and sagittal planes.  The comparison is made with the previous study dated 8/9/2019.  There are distal acinar/paraseptal emphysematous changes more  pronounced in the upper lobes, right more than the left.  A small groundglass opacity in the right upper lobe anteriorly, image  #134 in axial plane, is stable.  A small noncalcified nodule in the right middle lobe, in the anterior  lateral part of the right minor fissure, image #155 in axial plane, is  stable and measures 3 mm in diameter.  A pleural-based small nodule in the right middle lobe laterally, image  #160 in axial plane, measures 4 mm in greatest dimension. No  significant change in the previous study.  A small noncalcified nodule in the right middle lobe, image #193 in  axial plane, measures 6 mm in greatest dimension and is unchanged.  This is represent focal interlobular septal thickening/pleural lymph  node.  A subpleural nodule in the left lower lobe laterally, image #210 in  axial plane, measures 7 mm in greatest dimension and is stable.  An ill-defined small subpleural density in the left upper lobe  laterally, image #120 in axial plane, is stable.  A small ill-defined nodule in the left upper lobe laterally, image  #106 in axial plane appears moderately more pronounced since the  previous study. It measures 7 mm in greatest dimension. There are  surrounding spiculation extending towards the pleura.  No areas of focal consolidation or infiltrate.  The thyroid gland is not optimally visualized due to extensive streak  artifacts.  No visible exiting lymphadenopathy.  Atheromatous changes of the thoracic aorta are seen. No aneurysmal  dilatation.  There  are several small mediastinal lymph nodes which are stable and  unchanged in the previous study. These are not enlarged by CT  criteria. There is atheromatous changes of the coronary arteries.  There is a small sliding-type hiatal hernia.  The Included abdominal organs are not optimally visualized or  evaluated due to lower resolution technique used for this study.  The images reviewed in bone window show no acute bony abnormality,  bony lesion or bone destruction.  IMPRESSION:  A 7 mm nodule in the left upper lobe which have increased  in size since the previous study and shows some surrounding  spiculation. This needs to be further evaluated by a follow-up  examination in 6 months to ensure stability.  There are several other nodules in both lungs which are stable since  the previous study.  Chronic emphysematous minutes lung changes.  Lung RADS 3.  ACR Lung-RADS Assessment Categories:  Category 0 - Incomplete - additional lung cancer screening CT images  and/ or comparison to prior chest CT examinations is needed.  Category 1 - Negative - continue annual screening with LDCT in 12  months.  Category 2 - Benign Appearance or Behavior - continue annual screening  with LDCT in 12 months.  Category 3 - Probably Benign - 6 month LDCT.  Category 4A - Suspicious - 3 month LDCT;. PET/CT may be used when  there is a > 8mm solid component.  Category 4B - Suspicious - Chest CT with or without contrast, PET/CT  and/ or tissue sampling depending on the probability of malignancy and  comorbidities. PET/CT may be used when there is a > 8mm solid  component.  Category S - Significant - other.  Category C - Prior Lung Cancer.  Signed by Dr Dejuan Cheatham on 2/19/2020 10:26 -----------------------------------------------------------------------------------------------         -----------------------------------------------------------------------------------------------  Assessment/Plan   Problem List Items Addressed This Visit      None      Visit Diagnoses     Left upper lobe pulmonary nodule    -  Primary    7mm, first identifietd 8/9/2019, slight growth 2/2020        Patient's Body mass index is 26.78 kg/m². BMI is above normal parameters. Recommendations include: referral to primary care.      CT reviewed  The nodules of 3,4, and 6 mm size are stable on several months follow up and are of doubtful significance.  The other nodule is more worrisome appearing, but still too small to characterize by means other than serial imaging.  Will plan follow up scan and in meantime get early-cdt test.       Electronically signed by Jimbo Adam MD, 3/11/2020, 09:53

## 2020-03-11 NOTE — PATIENT INSTRUCTIONS
"Pulmonary Nodule  A pulmonary nodule is a small, round growth of tissue in the lung. It is sometimes referred to as a \"shadow\" or \"spot on the lung.\" Nodules range in size from less than 1/5 of an inch (4 mm) to a little bigger than an inch (30 mm).  Pulmonary nodules can be either noncancerous (benign) or cancerous (malignant). Most are noncancerous. Smaller nodules in people who do not smoke and do not have any other risk factors for lung cancer are more likely to be noncancerous. Larger, irregular nodules in people who smoke or who have a strong family history of lung cancer are more likely to be cancerous.  What are the causes?  This condition may be caused by:  · A bacterial, fungal, or viral infection, such as tuberculosis. The infection is usually an old and inactive one.  · A noncancerous mass of tissue.  · Inflammation from conditions such as rheumatoid arthritis.  · Abnormal blood vessels in the lungs.  · Cancerous tissue, such as lung cancer or a cancer in another part of the body that has spread to the lung.  What are the signs or symptoms?  This condition usually does not cause symptoms. If symptoms appear, they are usually related to the underlying cause. For example, if the condition is caused by an infection, you may have a cough or fever.  How is this diagnosed?  This condition is usually diagnosed with an X-ray or CT scan. To help determine whether a pulmonary nodule is benign or malignant, your health care provider will:  · Take your medical history.  · Perform a physical exam.  · Order tests, including:  ? Blood tests.  ? A skin test called a tuberculin test. This test is done to check if you have been exposed to the germ that causes tuberculosis.  ? Chest X-rays.  ? A CT scan. This test shows smaller pulmonary nodules more clearly and with more detail than an X-ray.  ? A positron emission tomography (PET) scan. This test is done to check if the nodule is cancerous. During the test, a safe amount " of a radioactive substance is injected into the bloodstream. Then a picture is taken.  ? Biopsy. In this test, a tiny piece of the pulmonary nodule is removed and then examined under a microscope.  How is this treated?  Treatment for this condition depends on whether the pulmonary nodule is malignant or benign as well as your risk of getting cancer.  · Noncancerous nodules usually do not need to be treated, but they may need to be monitored with CT scans. If a CT scan shows that the pulmonary nodule got bigger, more tests may be done.  · Some nodules need to be removed. If this is the case, you may have a procedure called a thoractomy. During the procedure, your health care provider will make an incision in your chest and remove the part of the lung where the nodule is located.  Follow these instructions at home:    · Take over-the-counter and prescription medicines only as told by your health care provider.  · Do not use any products that contain nicotine or tobacco, such as cigarettes and e-cigarettes. If you need help quitting, ask your health care provider.  · Keep all follow-up visits as told by your health care provider. This is important.  Contact a health care provider if:  · You have trouble breathing when you are active.  · You feel sick or unusually tired.  · You do not feel like eating.  · You lose weight without trying.  · You develop chills or night sweats.  Get help right away if:  · You cannot catch your breath.  · You begin wheezing.  · You cannot stop coughing.  · You cough up blood.  · You become dizzy or feel like you are going to faint.  · You have sudden chest pain.  · You have a fever or persistent symptoms for more than 2-3 days.  · You have a fever and your symptoms suddenly get worse.  Summary  · A pulmonary nodule is a small, round growth of tissue in the lung. Most pulmonary nodules are noncancerous.  · This condition is usually diagnosed with an X-ray or CT scan.  · Common causes of  pulmonary nodules include infection, inflammation, and noncancerous growths.  · Though less common, if a nodule is found to be cancerous, you will need specific diagnostic tests and treatment options as directed by your medical provider.  · Treatment for this condition depends on whether the pulmonary nodule is benign or malignant as well as your risk of getting cancer.  This information is not intended to replace advice given to you by your health care provider. Make sure you discuss any questions you have with your health care provider.  Document Released: 10/15/2010 Document Revised: 01/16/2018 Document Reviewed: 01/16/2018  [employer] Interactive Patient Education © 2020 [employer]

## 2020-03-12 ENCOUNTER — OFFICE VISIT (OUTPATIENT)
Dept: NEUROLOGY | Age: 60
End: 2020-03-12
Payer: COMMERCIAL

## 2020-03-12 VITALS
HEIGHT: 73 IN | RESPIRATION RATE: 16 BRPM | BODY MASS INDEX: 26.45 KG/M2 | WEIGHT: 199.6 LBS | DIASTOLIC BLOOD PRESSURE: 63 MMHG | HEART RATE: 66 BPM | SYSTOLIC BLOOD PRESSURE: 111 MMHG

## 2020-03-12 PROCEDURE — 99213 OFFICE O/P EST LOW 20 MIN: CPT | Performed by: PSYCHIATRY & NEUROLOGY

## 2020-03-12 RX ORDER — CEFUROXIME AXETIL 250 MG/1
TABLET ORAL
Qty: 12 CARTRIDGE | Refills: 5 | Status: SHIPPED | OUTPATIENT
Start: 2020-03-12

## 2020-03-16 ENCOUNTER — RESULTS ENCOUNTER (OUTPATIENT)
Dept: PULMONOLOGY | Facility: CLINIC | Age: 60
End: 2020-03-16

## 2020-03-16 DIAGNOSIS — R91.1 LEFT UPPER LOBE PULMONARY NODULE: ICD-10-CM

## 2020-03-18 ENCOUNTER — TELEPHONE (OUTPATIENT)
Dept: NEUROLOGY | Age: 60
End: 2020-03-18

## 2020-03-18 NOTE — TELEPHONE ENCOUNTER
Called and informed the patient that the Prior Authorization for SUMAtriptan Succinate 6MG/0.5ML was approved. Will add additional information to Media once received. Called and informed the Pharmacy that the Prior Authorization was approved.

## 2020-03-18 NOTE — TELEPHONE ENCOUNTER
Dot [facility] Case ID: [id number] - Rx #: [id number] Need help?  Call us at [phone]   Status   Sent to [address] March 17   DrugSUMAtriptan Succinate 6MG/0.5ML auto-injectors   FormCigna Commercial Electronic PA Form   Original Claim Info76 QUANTITY IS MORE THAN ALLOWED BY PLAN;SUBMIT VALUE OR = 4.000MAX 4 ML PER 30 DAYS -[address] [place] [address] [phone]

## 2020-03-25 ENCOUNTER — PATIENT MESSAGE (OUTPATIENT)
Dept: NEUROLOGY | Age: 60
End: 2020-03-25

## 2020-03-26 RX ORDER — METHYLPREDNISOLONE 4 MG/1
TABLET ORAL
Qty: 1 KIT | Refills: 0 | Status: SHIPPED | OUTPATIENT
Start: 2020-03-26 | End: 2020-04-01

## 2020-03-26 NOTE — TELEPHONE ENCOUNTER
From: Clara Guzman  To: Melba Deleon MD  Sent: 3/25/2020 9:40 AM CDT  Subject: Non-Urgent Medical Question    I am going through another series of headaches. I'm having one about every 6 hours. The oxygen is helping but it's only lasting a couple of hours. This has been going on for the past 7 days. Is there any way I could get a steroid pack or something that could help offset this round of headaches.      Thank you,

## 2020-03-27 RX ORDER — SUMATRIPTAN 100 MG/1
TABLET, FILM COATED ORAL
Qty: 9 TABLET | Refills: 5 | Status: SHIPPED | OUTPATIENT
Start: 2020-03-27 | End: 2020-12-12

## 2020-03-27 NOTE — TELEPHONE ENCOUNTER
Requested Prescriptions     Pending Prescriptions Disp Refills    SUMAtriptan (IMITREX) 100 MG tablet [Pharmacy Med Name: SUMATRIPTAN 100MG TABLETS] 9 tablet 5     Sig: TAKE ONE TABLET BY MOUTH ONSET OF A HEADACHE, MAY REPEAT IN TWO HOURS IF HEADACHE PERSISTS, MAX OF 2 TABLETS IN 24 HOURS OR 4 TABLETS WEEKLY       Last Office Visit: 3/12/2020  Next Office Visit: 9/17/2020  Last Medication Refill: 10/4/2018 with 5 refills          **Dr. Chucky Herrera patient please**

## 2020-04-30 ENCOUNTER — HOSPITAL ENCOUNTER (OUTPATIENT)
Dept: VASCULAR LAB | Age: 60
Discharge: HOME | End: 2020-04-30
Payer: COMMERCIAL

## 2020-04-30 PROCEDURE — 93880 EXTRACRANIAL BILAT STUDY: CPT

## 2020-05-07 ENCOUNTER — TELEPHONE (OUTPATIENT)
Dept: VASCULAR SURGERY | Age: 60
End: 2020-05-07

## 2020-05-12 ENCOUNTER — VIRTUAL VISIT (OUTPATIENT)
Dept: VASCULAR SURGERY | Age: 60
End: 2020-05-12
Payer: COMMERCIAL

## 2020-05-12 PROCEDURE — 99213 OFFICE O/P EST LOW 20 MIN: CPT | Performed by: NURSE PRACTITIONER

## 2020-05-12 NOTE — PROGRESS NOTES
Patient Initiated Episode with an Established Patient who has not had a related appointment within my department in the past 7 days or scheduled within the next 24 hours.     Total Time: minutes: 11-20 minutes    Note: not billable if this call serves to triage the patient into an appointment for the relevant concern      Mingo Cruz

## 2020-06-02 ENCOUNTER — TELEPHONE (OUTPATIENT)
Dept: VASCULAR SURGERY | Age: 60
End: 2020-06-02

## 2020-07-16 RX ORDER — ROSUVASTATIN CALCIUM 10 MG/1
10 TABLET, COATED ORAL DAILY
Qty: 30 TABLET | Refills: 5 | Status: SHIPPED | OUTPATIENT
Start: 2020-07-16 | End: 2021-01-11 | Stop reason: SDUPTHER

## 2020-08-03 RX ORDER — BUTALBITAL, ACETAMINOPHEN AND CAFFEINE 50; 325; 40 MG/1; MG/1; MG/1
TABLET ORAL
Qty: 30 TABLET | Refills: 5 | Status: SHIPPED | OUTPATIENT
Start: 2020-08-03 | End: 2021-03-29

## 2020-08-03 NOTE — TELEPHONE ENCOUNTER
Lala Yo has requested a refill on his medication.       Last office visit : 3/12/2020   Next office visit : 9/17/2020   Last medication refill :2/19/20  Rudi Bright : up to date today      Requested Prescriptions     Pending Prescriptions Disp Refills    butalbital-acetaminophen-caffeine (FIORICET, ESGIC) -40 MG per tablet [Pharmacy Med Name: BUTALBITAL/ACETAMINOPHEN/CAFF TABS] 30 tablet 2     Sig: TAKE 1 TABLET BY MOUTH TWICE DAILY AS NEEDED FOR HEADACHE

## 2020-08-06 ENCOUNTER — OFFICE VISIT (OUTPATIENT)
Dept: PRIMARY CARE CLINIC | Age: 60
End: 2020-08-06
Payer: COMMERCIAL

## 2020-08-06 VITALS
TEMPERATURE: 98.2 F | OXYGEN SATURATION: 98 % | WEIGHT: 195 LBS | SYSTOLIC BLOOD PRESSURE: 100 MMHG | DIASTOLIC BLOOD PRESSURE: 60 MMHG | HEART RATE: 58 BPM | HEIGHT: 73 IN | RESPIRATION RATE: 16 BRPM | BODY MASS INDEX: 25.84 KG/M2

## 2020-08-06 PROCEDURE — 99396 PREV VISIT EST AGE 40-64: CPT | Performed by: NURSE PRACTITIONER

## 2020-08-06 ASSESSMENT — ENCOUNTER SYMPTOMS
EYES NEGATIVE: 1
GASTROINTESTINAL NEGATIVE: 1
ALLERGIC/IMMUNOLOGIC NEGATIVE: 1
RESPIRATORY NEGATIVE: 1

## 2020-08-06 NOTE — PROGRESS NOTES
Alcohol use: No     Alcohol/week: 0.0 standard drinks      Current Outpatient Medications   Medication Sig Dispense Refill    butalbital-acetaminophen-caffeine (FIORICET, ESGIC) -40 MG per tablet TAKE 1 TABLET BY MOUTH TWICE DAILY AS NEEDED FOR HEADACHE 30 tablet 5    rosuvastatin (CRESTOR) 10 MG tablet TAKE 1 TABLET BY MOUTH DAILY 30 tablet 5    SUMAtriptan (IMITREX) 100 MG tablet TAKE ONE TABLET BY MOUTH ONSET OF A HEADACHE, MAY REPEAT IN TWO HOURS IF HEADACHE PERSISTS, MAX OF 2 TABLETS IN 24 HOURS OR 4 TABLETS WEEKLY 9 tablet 5    SUMAtriptan Succinate 6 MG/0.5ML SOAJ INJECT 1 SYRINGE AT ONSET OF A HEADACHE, MAY REPEAT IN 2 HOURS IF HEADACHE PERSISTS. MAX OF 2 IN 24 HOURS OR 4 WEEKLY 12 Cartridge 5    sildenafil (VIAGRA) 50 MG tablet Take 1 tablet by mouth as needed for Erectile Dysfunction 12 tablet 3    melatonin 3 MG TABS tablet Take 3 mg by mouth daily Indications: 2 PO QHS      aspirin 81 MG tablet Take 81 mg by mouth daily       verapamil (CALAN SR) 240 MG extended release tablet TAKE 1 TABLET BY MOUTH TWICE DAILY 180 tablet 3     No current facility-administered medications for this visit. No Known Allergies    Health Maintenance   Topic Date Due    Hepatitis C screen  1960    HIV screen  08/04/1976    Shingles Vaccine (1 of 2) 08/04/2011    DTaP/Tdap/Td vaccine (1 - Tdap) 02/11/2026 (Originally 8/4/1980)    Flu vaccine (1) 09/01/2020    Low dose CT lung screening  02/19/2021    Lipid screen  08/14/2021    Colon cancer screen colonoscopy  04/20/2023    Pneumococcal 0-64 years Vaccine  Completed    Hepatitis A vaccine  Aged Out    Hepatitis B vaccine  Aged Out    Hib vaccine  Aged Out    Meningococcal (ACWY) vaccine  Aged Out       Subjective:      Review of Systems   Constitutional: Negative. HENT:        Lip lesion   Eyes: Negative. Respiratory: Negative. Cardiovascular: Negative. Gastrointestinal: Negative. Endocrine: Negative.     Genitourinary: Negative. Musculoskeletal: Negative. Skin: Negative. Allergic/Immunologic: Negative. Neurological: Positive for headaches. Hematological: Negative. Psychiatric/Behavioral: Negative. Objective:     Physical Exam  Vitals signs and nursing note reviewed. Constitutional:       Appearance: He is well-developed. HENT:      Head: Normocephalic and atraumatic. Comments: Firm round 2 mm lip lesion  Keratosis    Cardiovascular:      Rate and Rhythm: Normal rate and regular rhythm. Heart sounds: Normal heart sounds. Pulmonary:      Effort: Pulmonary effort is normal.      Breath sounds: Normal breath sounds. Genitourinary:     Comments: deferred  Musculoskeletal: Normal range of motion. Skin:     General: Skin is warm and dry. Neurological:      Mental Status: He is alert and oriented to person, place, and time. Psychiatric:         Behavior: Behavior normal.         Thought Content: Thought content normal.         Judgment: Judgment normal.       /60   Pulse 58   Temp 98.2 °F (36.8 °C) (Temporal)   Resp 16   Ht 6' 1\" (1.854 m)   Wt 195 lb (88.5 kg)   SpO2 98%   BMI 25.73 kg/m²     Assessment:       Diagnosis Orders   1. Well adult exam     2. Other migraine without status migrainosus, intractable     3. Mixed hyperlipidemia           Plan:     He will watch the place on his lip if it changes we will send him to get a biopsy. Will return for fasting lab work. Patient given educational materials -see patient instructions. Discussed use, benefit, and side effects of prescribed medications. All patient questions answered. Pt voiced understanding. Reviewed health maintenance. Instructed to continue currentmedications, diet and exercise. Patient agreed with treatment plan. Follow up as directed. MEDICATIONS:  No orders of the defined types were placed in this encounter.         ORDERS:  No orders of the defined types were placed in this encounter. Follow-up:  No follow-ups on file. PATIENT INSTRUCTIONS:  There are no Patient Instructions on file for this visit. Electronically signed by GUILHERME Gurrola CNP on 8/18/2020 at 10:17 AM    EMR Dragon/transcription disclaimer:  Much of thisencounter note is electronic transcription/translation of spoken language to printed texts. The electronic translation of spoken language may be erroneous, or at times, nonsensical words or phrases may be inadvertentlytranscribed.   Although I have reviewed the note for such errors, some may still exist.

## 2020-08-14 ENCOUNTER — NURSE ONLY (OUTPATIENT)
Dept: PRIMARY CARE CLINIC | Age: 60
End: 2020-08-14
Payer: COMMERCIAL

## 2020-08-14 ENCOUNTER — TELEPHONE (OUTPATIENT)
Dept: NEUROLOGY | Age: 60
End: 2020-08-14

## 2020-08-14 LAB
ALBUMIN SERPL-MCNC: 4.2 G/DL (ref 3.5–5.2)
ALP BLD-CCNC: 88 U/L (ref 40–130)
ALT SERPL-CCNC: 12 U/L (ref 5–41)
ANION GAP SERPL CALCULATED.3IONS-SCNC: 14 MMOL/L (ref 7–19)
AST SERPL-CCNC: 19 U/L (ref 5–40)
BILIRUB SERPL-MCNC: 0.3 MG/DL (ref 0.2–1.2)
BUN BLDV-MCNC: 10 MG/DL (ref 6–20)
CALCIUM SERPL-MCNC: 9 MG/DL (ref 8.6–10)
CHLORIDE BLD-SCNC: 106 MMOL/L (ref 98–111)
CHOLESTEROL, TOTAL: 137 MG/DL (ref 160–199)
CO2: 23 MMOL/L (ref 22–29)
CREAT SERPL-MCNC: 1 MG/DL (ref 0.5–1.2)
GFR AFRICAN AMERICAN: >59
GFR NON-AFRICAN AMERICAN: >60
GLUCOSE BLD-MCNC: 89 MG/DL (ref 74–109)
HCT VFR BLD CALC: 45.4 % (ref 42–52)
HDLC SERPL-MCNC: 27 MG/DL (ref 55–121)
HEMOGLOBIN: 15.1 G/DL (ref 14–18)
LDL CHOLESTEROL CALCULATED: 99 MG/DL
MCH RBC QN AUTO: 33.6 PG (ref 27–31)
MCHC RBC AUTO-ENTMCNC: 33.3 G/DL (ref 33–37)
MCV RBC AUTO: 100.9 FL (ref 80–94)
PDW BLD-RTO: 13.3 % (ref 11.5–14.5)
PLATELET # BLD: 247 K/UL (ref 130–400)
PMV BLD AUTO: 9.7 FL (ref 9.4–12.4)
POTASSIUM SERPL-SCNC: 4.8 MMOL/L (ref 3.5–5)
PROSTATE SPECIFIC ANTIGEN: 0.45 NG/ML (ref 0–4)
RBC # BLD: 4.5 M/UL (ref 4.7–6.1)
SODIUM BLD-SCNC: 143 MMOL/L (ref 136–145)
TOTAL PROTEIN: 6.6 G/DL (ref 6.6–8.7)
TRIGL SERPL-MCNC: 57 MG/DL (ref 0–149)
WBC # BLD: 10 K/UL (ref 4.8–10.8)

## 2020-08-14 PROCEDURE — 36415 COLL VENOUS BLD VENIPUNCTURE: CPT | Performed by: FAMILY MEDICINE

## 2020-08-14 NOTE — TELEPHONE ENCOUNTER
Patient called stating he has been experiencing cluster migraines for about 9-10 days, he is having about two painful headaches a day. Pt is requesting a steroid pack to help break up these headaches. Please advise.

## 2020-08-17 ENCOUNTER — PATIENT MESSAGE (OUTPATIENT)
Dept: NEUROLOGY | Age: 60
End: 2020-08-17

## 2020-08-17 RX ORDER — VERAPAMIL HYDROCHLORIDE 240 MG/1
TABLET, FILM COATED, EXTENDED RELEASE ORAL
Qty: 180 TABLET | Refills: 3 | Status: SHIPPED | OUTPATIENT
Start: 2020-08-17 | End: 2021-08-09

## 2020-08-18 RX ORDER — METHYLPREDNISOLONE 4 MG/1
TABLET ORAL
Qty: 1 KIT | Refills: 1 | Status: SHIPPED | OUTPATIENT
Start: 2020-08-18 | End: 2020-08-24

## 2020-09-16 ENCOUNTER — HOSPITAL ENCOUNTER (OUTPATIENT)
Dept: CT IMAGING | Age: 60
Discharge: HOME | End: 2020-09-16
Payer: COMMERCIAL

## 2020-09-16 PROCEDURE — 71250 CT THORAX DX C-: CPT

## 2020-09-17 ENCOUNTER — OFFICE VISIT (OUTPATIENT)
Dept: NEUROLOGY | Age: 60
End: 2020-09-17
Payer: COMMERCIAL

## 2020-09-17 VITALS
HEIGHT: 73 IN | BODY MASS INDEX: 26.24 KG/M2 | HEART RATE: 61 BPM | RESPIRATION RATE: 18 BRPM | SYSTOLIC BLOOD PRESSURE: 106 MMHG | DIASTOLIC BLOOD PRESSURE: 68 MMHG | WEIGHT: 198 LBS

## 2020-09-17 DIAGNOSIS — R91.1 LEFT UPPER LOBE PULMONARY NODULE: ICD-10-CM

## 2020-09-17 PROCEDURE — 99213 OFFICE O/P EST LOW 20 MIN: CPT | Performed by: PSYCHIATRY & NEUROLOGY

## 2020-09-17 RX ORDER — ELETRIPTAN HYDROBROMIDE 40 MG/1
TABLET, FILM COATED ORAL
Qty: 12 TABLET | Refills: 5 | Status: SHIPPED | OUTPATIENT
Start: 2020-09-17

## 2020-09-17 RX ORDER — PREDNISONE 20 MG/1
TABLET ORAL
Qty: 20 TABLET | Refills: 0 | Status: SHIPPED | OUTPATIENT
Start: 2020-09-17 | End: 2021-03-18 | Stop reason: SDUPTHER

## 2020-09-17 RX ORDER — GALCANEZUMAB 100 MG/ML
300 INJECTION, SOLUTION SUBCUTANEOUS
Qty: 3 SYRINGE | Refills: 5 | Status: SHIPPED | OUTPATIENT
Start: 2020-09-17

## 2020-09-17 NOTE — PROGRESS NOTES
[id number] [facility]  [address] [place] [address] Drive, [address] [place] [address] [address]  Britt Gill  Phone [phone]  Fax [phone] [id number] [facility] Follow Up Encounter  20 1:25 PM CDT    Information:   Patient Name: Jacek Sol  :   1960  Age:   62 y.o. MRN:   [id number]  Account #:  [de-identified]  Today:  20    Provider: Quincy Howard M.D. Chief Complaint:   Chief Complaint   Patient presents with    6 Month Follow-Up    Migraine       Subjective:   Jacek Sol is a 62 y.o. man with a history of cluster headaches and peripheral neuropathy who is following up. He tends to have clusters in the spring and fall. Gianni Varela has occasional similar headaches in between. [place] almost always wake him from sleep, a couple hours after going to sleep.  The oxygen is working very well. Aldon Cabot gets rid of them in a minute or so. Gianni Varela has had some burning in his feet.  He has numbness and tingling.  He has no numbness in his hands.  He has no back pain or sciatica. Gianni Varela has not noted aggravating or alleviating factors but it seems to get worse when he takes his shoes off.        Objective:     Past Medical History:  Past Medical History:   Diagnosis Date    BPH (benign prostatic hyperplasia)     Carotid artery stenosis     Cluster headache, intractable     Hyperlipidemia     Migraine headache     Other malaise and fatigue        Past Surgical History:   Procedure Laterality Date    BACK SURGERY      COLONOSCOPY  03/15/2013    Pamela: adenomatous and hyperplastic polyps (5 yr)    HERNIA REPAIR      AR COLONOSCOPY FLX DX W/COLLJ SPEC WHEN PFRMD N/A 2018    Dr Trisha Nolan, 5 yr recall       Recent Hospitalizations  · None    Significant Injuries  · None    Habits  Jacek Sol reports that he has been smoking cigarettes. He has a 35.00 pack-year smoking history. He has never used smokeless tobacco. He reports that he does not drink alcohol or use drugs.     Family History   Problem Relation Age of Onset    Cancer Mother         Shaheen Carrera Father         Breast CA    Other Sister         aneurysm    Cancer Other 58    Cancer Other 59       Social History  Petar Liu is , lives in [address] and works at [employer]. Medications:  Current Outpatient Medications   Medication Sig Dispense Refill    verapamil (CALAN SR) 240 MG extended release tablet TAKE 1 TABLET BY MOUTH TWICE DAILY 180 tablet 3    butalbital-acetaminophen-caffeine (FIORICET, ESGIC) -40 MG per tablet TAKE 1 TABLET BY MOUTH TWICE DAILY AS NEEDED FOR HEADACHE 30 tablet 5    rosuvastatin (CRESTOR) 10 MG tablet TAKE 1 TABLET BY MOUTH DAILY 30 tablet 5    SUMAtriptan (IMITREX) 100 MG tablet TAKE ONE TABLET BY MOUTH ONSET OF A HEADACHE, MAY REPEAT IN TWO HOURS IF HEADACHE PERSISTS, MAX OF 2 TABLETS IN 24 HOURS OR 4 TABLETS WEEKLY 9 tablet 5    SUMAtriptan Succinate 6 MG/0.5ML SOAJ INJECT 1 SYRINGE AT ONSET OF A HEADACHE, MAY REPEAT IN 2 HOURS IF HEADACHE PERSISTS. MAX OF 2 IN 24 HOURS OR 4 WEEKLY 12 Cartridge 5    sildenafil (VIAGRA) 50 MG tablet Take 1 tablet by mouth as needed for Erectile Dysfunction 12 tablet 3    melatonin 3 MG TABS tablet Take 3 mg by mouth daily Indications: 2 PO QHS      aspirin 81 MG tablet Take 81 mg by mouth daily        No current facility-administered medications for this visit. Allergies:   Allergies as of 09/17/2020    (No Known Allergies)       Review of Systems:  Constitutional: negative for - chills and fever  Eyes:  negative for - visual disturbance and photophobia  HENMT: positive for - headaches and sinus pain  Respiratory: negative for - cough, hemoptysis, and shortness of breath  Cardiovascular: negative for - chest pain and palpitations  Gastrointestinal: negative for - blood in stools, constipation, diarrhea, nausea, and vomiting  Genito-Urinary: negative for - hematuria, urinary frequency, urinary urgency, and urinary retention  Musculoskeletal: negative for - Normal strength in both upper and lower extremities. Normal muscle tone and bulk. Deep tendon reflexes are intact and symmetrical in both upper and lower extremities. Curtis's signs are absent bilaterally. There is no ankle clonus on either side. Sensation:  Sensation is intact to light touch, temperature, and vibration in all extremities. Coordination:  Rapid alternating movements are normal in both upper and lower extremities. Finger to nose testing is unimpaired bilaterally. Gait:  Normal station and gait. Assessment:       ICD-10-CM    1. Intractable chronic cluster headache  G44.021    2. Idiopathic peripheral neuropathy  G60.9    I discussed the diagnosis, pathophysiology, symptoms, risks, prognosis, and treatment options of cluster headaches and peripheral neueropathy with Sudheer Olivas. I discussed Emgality with him and that it can end a cluster. He is interested in trying that. Plan:   1. Start Emgality 300 mg SQ q month at onset of cluster  2. PRN Relpax for when he cannot get to his oxygen. He does not like SQ Imitrex or Migranol NS  3. PRN prednisone tapor  4.  FU in 6 months    Electronically signed by Reid Dash MD on 9/17/20

## 2020-09-17 NOTE — PROGRESS NOTES
Please call the patient regarding his abnormal result.  The nodule that we are interested is still there, may be a little larger and I am still concerned about it.  Multiple different options for checking it further are possible; will discuss at office visit.  Nothing else to do between now and monday; make sure to keep appt.

## 2020-09-20 PROBLEM — Z86.0101 HISTORY OF ADENOMATOUS POLYP OF COLON: Status: ACTIVE | Noted: 2018-03-05

## 2020-09-20 PROBLEM — Z86.010 HISTORY OF ADENOMATOUS POLYP OF COLON: Status: ACTIVE | Noted: 2018-03-05

## 2020-09-21 ENCOUNTER — OFFICE VISIT (OUTPATIENT)
Dept: PULMONOLOGY | Facility: CLINIC | Age: 60
End: 2020-09-21

## 2020-09-21 VITALS
SYSTOLIC BLOOD PRESSURE: 108 MMHG | WEIGHT: 190.6 LBS | TEMPERATURE: 98.1 F | HEART RATE: 68 BPM | HEIGHT: 73 IN | OXYGEN SATURATION: 98 % | DIASTOLIC BLOOD PRESSURE: 70 MMHG | BODY MASS INDEX: 25.26 KG/M2

## 2020-09-21 DIAGNOSIS — R91.1 LEFT UPPER LOBE PULMONARY NODULE: Primary | ICD-10-CM

## 2020-09-21 DIAGNOSIS — N40.0 BENIGN PROSTATIC HYPERPLASIA, UNSPECIFIED WHETHER LOWER URINARY TRACT SYMPTOMS PRESENT: ICD-10-CM

## 2020-09-21 DIAGNOSIS — F17.209 NICOTINE DEPENDENCE WITH NICOTINE-INDUCED DISORDER, UNSPECIFIED NICOTINE PRODUCT TYPE: ICD-10-CM

## 2020-09-21 DIAGNOSIS — J43.2 CENTRILOBULAR EMPHYSEMA (HCC): ICD-10-CM

## 2020-09-21 DIAGNOSIS — I65.23 BILATERAL CAROTID ARTERY STENOSIS: ICD-10-CM

## 2020-09-21 PROCEDURE — 99214 OFFICE O/P EST MOD 30 MIN: CPT | Performed by: INTERNAL MEDICINE

## 2020-09-21 NOTE — PROGRESS NOTES
Subjective   Carlos Anaya is a 60 y.o. male.     Background: Pt with low dose ct showing 7 mm spiculated nodule in ABDOUL 2/2020 increased when compared to previously 8/9/2019    Chief Complaint   Patient presents with   • left upper lobe pulmonary nodule        History of Present Illness   Patient follows up regarding a lung nodule.  He had a prior CT and underwent  After that.  He recalls during the first CT scan that he held his breath, second 1 that he took a deep breath and held it.  He is a smoker and I recommended stop smoking cessation.  He is back after the latest CT of the chest showing nodule in the right lung present continuously for several months which appears increasing in size and possibly increasing in severity with no aggravating alleviating factors or associated symptoms.  He unfortunately continues to smoke [place] cigarettes.    Medical/Family/Social History   has a past medical history of High cholesterol and Migraines.   has a past surgical history that includes Back surgery; Hernia repair; and Colonoscopy.  family history includes Breast cancer in his father; Lung cancer in his mother.   reports that he has been smoking cigarettes. He has a 40.00 pack-year smoking history. He has never used smokeless tobacco. He reports previous alcohol use. He reports that he does not use drugs.  No Known Allergies  Medications    Current Outpatient Medications:   •  aspirin 81 MG tablet, Take 81 mg by mouth., Disp: , Rfl:   •  butalbital-acetaminophen-caffeine (FIORICET, ESGIC) -40 MG per tablet, TAKE 1 TABLET BY MOUTH TWICE DAILY AS NEEDED FOR HEADACHE, Disp: , Rfl:   •  melatonin 3 MG tablet, Take 3 mg by mouth., Disp: , Rfl:   •  rosuvastatin (CRESTOR) 10 MG tablet, Take 10 mg by mouth., Disp: , Rfl:   •  sildenafil (VIAGRA) 50 MG tablet, Take 50 mg by mouth., Disp: , Rfl:   •  SUMAtriptan (IMITREX) 100 MG tablet, As directed, Disp: , Rfl:   •  SUMAtriptan Succinate (IMITREX) 6 MG/0.5ML injection, INJECT  "1 SYRINGE AT ONSET OF A HEADACHE, MAY REPEAT IN 2 HOURS IF HEADACHE PERSISTS. MAX OF 2 IN 24 HOURS OR 4 WEEKLY, Disp: , Rfl:   •  verapamil SR (CALAN-SR) 240 MG CR tablet, TAKE 1 TABLET BY MOUTH TWICE DAILY, Disp: , Rfl:     Review of Systems   Constitutional: Negative for chills and fever.   Cardiovascular: Negative for chest pain.   Gastrointestinal: Negative for nausea and vomiting.       Objective   /70   Pulse 68   Temp 98.1 °F (36.7 °C)   Ht 185.4 cm (73\")   Wt 86.5 kg (190 lb 9.6 oz)   SpO2 98% Comment: BRITTANEY  BMI 25.15 kg/m²   Physical Exam  Constitutional:       General: He is not in acute distress.     Appearance: He is well-developed. He is not diaphoretic.   Eyes:      General: No scleral icterus.  Neck:      Vascular: No JVD.      Trachea: No tracheal deviation.   Cardiovascular:      Rate and Rhythm: Normal rate and regular rhythm.      Heart sounds: No murmur. No gallop.    Pulmonary:      Effort: No respiratory distress.      Breath sounds: No stridor. Wheezing present. No rhonchi.   Abdominal:      Palpations: Abdomen is soft.      Tenderness: There is no abdominal tenderness. There is no guarding.   Skin:     General: Skin is warm and dry.   Psychiatric:         Behavior: Behavior normal.         -----------------------------------------------------------------------------------------------  EXAMINATION: CT CHEST WO CONTRAST 9/16/2020 2:40 PM  HISTORY: Left upper lobe pulmonary nodule  Comparison: Low dose CT chest without contrast 2/19/2020, low-dose  screening CT 8/9/2019  Technique: Sequential imaging was performed from the thoracic inlet  through the upper abdomen without the use of IV contrast. Sagittal and  coronal reformations were made from the original source data and  reviewed. Automated exposure control was utilized for radiation dose  reduction.  Radiation dose:  mGy-cm  Findings:  The visualized thyroid gland is grossly normal in appearance. The  trachea and main " bronchi appear widely patent and in normal anatomic  position. The esophagus is grossly normal in appearance.  No pathologically enlarged axillary, mediastinal, or hilar lymph nodes  are identified when allowing for limitations of a noncontrast exam.  The heart appears normal in size. There is no pericardial or pleural  effusion. Coronary artery calcifications are present. The ascending  thoracic aorta measures 3.3 cm in diameter. Main pulmonary artery  appears normal in caliber.  There is parenchymal scarring at the lung apices with associated  cystic and bullous changes. Emphysematous changes are noted. There is a spiculated nodule in the lateral left upper lobe which measures 1.0x 0.9 cm in size, previously measuring 0.7 x 0.6 cm in size. Findings are suspicious for malignancy. An additional adjacent nodule is seen just inferior and a subpleural region which measures 5 mm in size(axial image 57), stable. There is a groundglass nodule in the left lower lobe which measures 7 mm in size (axial image 90), stable. An additional left lower lobe subpleural nodule measures 6 mm in size (axial image 108), previously measuring 7 mm. A groundglass nodule in the right upper lobe measures 9 mm in size (axial image 46), stable. An additional right upper lobe groundglass nodule measures 8 mm in size (axial image 65), stable. There is an adjacent 4 mm groundglass nodule. A right middle lobe nodule measures 7 mm in size, stable (axial image 97). There is a pleural-based nodule in the lateral right middle lobe measuring approximately 4 mm in size (axial image 79), stable.  Review of the visualized portion of the upper abdomen demonstrates  nonobstructive bilateral renal calculi. Review of the visualized osseous structures demonstrates no acute or aggressive lesions. Minor degenerative spurring is noted in the spine.  IMPRESSION:  Impression:  1. Interval increase in size of a spiculated nodule in the left upper  lobe, concerning  for neoplasm.  2. Additional noncalcified pulmonary nodules throughout the lungs  appear stable to slightly decreased in size and should be followed on  subsequent CT exams.  3. Emphysema.  4. Atherosclerosis of the aorta and coronary arteries.  5. No suspicious lymphadenopathy.  Signed by Dr Mike Juares on 9/16/2020 2:52 PM     -----------------------------------------------------------------------------------------------         -----------------------------------------------------------------------------------------------  Assessment/Plan   Problem List Items Addressed This Visit     Bilateral carotid artery stenosis    BPH (benign prostatic hyperplasia)    Nicotine dependence    Relevant Orders    NM Pet Skull Base To Mid Thigh      Other Visit Diagnoses     Left upper lobe pulmonary nodule    -  Primary    Relevant Orders    NM Pet Skull Base To Mid Thigh    Centrilobular emphysema (CMS/HCC)          lung nodule of concern is increasing.  Discussed differential dx of granuloma vs malignancy vs other indolent benign process    Patient's Body mass index is 25.15 kg/m². BMI is within normal parameters. No follow-up required..      We will get PET to better characterize lung nodule, follow up by phone  Smoking cessation recommended, especially if nodule is to be resected  No need for rx copd currently in absence of symptoms  Should we need to treat copd, need to be careful with anticholinergics which can aggravate prostate symptoms.  Will need to get PFT if surgery is contemplated  Will consider britt/robotic bronch if PET scan indeterminate       Electronically signed by Jimbo Adam MD, 9/21/2020, 09:47 CDT

## 2020-09-21 NOTE — PATIENT INSTRUCTIONS
PET Scan  A PET scan (positron emission tomography) is a test that creates pictures of the inside of your body. For the test, a small amount of radioactive material is injected into a vein. A special scanner then takes pictures of your body.  The pictures created during a PET scan can be used to study diseases, like cancer. The colors and brightness on the pictures show different levels of organ and tissue function. For example, cancer tissue appears brighter than normal tissue on a PET scan image.  Tell a health care provider about:  · Any allergies you have.  · All medicines you are taking, including vitamins, herbs, eye drops, creams, and over-the-counter medicines.  · Any blood disorders you have.  · Any surgeries you have had.  · Any medical conditions you have.  · If you are afraid of cramped spaces (claustrophobic). If claustrophobia is a problem, it usually can be relieved with a medicine to help you relax (sedative) or a medicine to treat anxiety.  · If you have trouble staying still for long periods of time.  · Whether you are pregnant or may be pregnant.  What are the risks?  Generally, this is a safe test. However, problems may occur, including:  · Bleeding, pain, or swelling at the injection site.  · Allergic reactions to the radioactive material. This is rare.  What happens before the procedure?  · Do not eat or drink anything after midnight on the night before the procedure, or as directed by your health care provider.  · Take medicines only as directed by your health care provider.  · Tell your health care provider if you are pregnant or breastfeeding.  · If you have diabetes, ask your health care provider for diet guidelines to control your blood sugar (glucose) levels on the day of the test.  What happens during the procedure?  · An IV will be inserted into one of your veins.  · A small amount of radioactive material will be injected into a vein.  · You will wait 30-60 minutes after the injection.  This allows the material to travel through your body.  · You will lie on a cushioned table, and the table will be moved through the center of an imaging machine similar to a CT scanner.  · Pictures of your body will be taken. It will take about 30-60 minutes for the machine to produce the pictures. You will need to stay very still during this time.  What happens after the procedure?    · Ask your health care provider, or the department that is doing the test:  ? When will my results be ready?  ? How will I get my results?  ? What are my treatment options?  ? What other tests do I need?  ? What are my next steps?  · You may resume your normal diet and activities.  · Drink 6-8 glasses of water after the test to flush the radioactive material out of your body. Drink enough fluid to keep your urine pale yellow.  Summary  · A PET scan is a test that creates pictures of the inside of the body. PET stands for positron emission tomography.  · For this test, a small dose of a harmless radioactive material is injected into a vein. It will travel through your body in 30-60 minutes.  · While lying down and staying very still, you will be moved through a machine that takes pictures of your body. This will take 30-60 minutes.  · The colors and brightness on the pictures show different levels of organ and tissue function. For example, cancer tissue appears brighter than normal tissue on a PET scan image.  This information is not intended to replace advice given to you by your health care provider. Make sure you discuss any questions you have with your health care provider.  Document Released: 06/23/2004 Document Revised: 01/09/2019 Document Reviewed: 01/09/2019  Elsevier Patient Education © 2020 [employer]

## 2020-09-25 ENCOUNTER — TELEPHONE (OUTPATIENT)
Dept: NEUROLOGY | Age: 60
End: 2020-09-25

## 2020-09-25 NOTE — TELEPHONE ENCOUNTER
Steffi Ru Gardner: [id number] - PA Case ID: [id number] - Rx #: [id number] Need help?  Call us at [phone]   Status   Sent to [facility]   DrugEmgality 100MG/ML syringes (episodic cluster headache)   FormCigna Commercial Electronic PA Form   Original Claim [id number]

## 2020-09-30 ENCOUNTER — HOSPITAL ENCOUNTER (OUTPATIENT)
Dept: NUCLEAR MEDICINE | Age: 60
Discharge: HOME | End: 2020-10-02
Payer: COMMERCIAL

## 2020-09-30 DIAGNOSIS — R91.1 LEFT UPPER LOBE PULMONARY NODULE: ICD-10-CM

## 2020-09-30 DIAGNOSIS — F17.209 NICOTINE DEPENDENCE WITH NICOTINE-INDUCED DISORDER, UNSPECIFIED NICOTINE PRODUCT TYPE: ICD-10-CM

## 2020-09-30 LAB
GLUCOSE BLD-MCNC: 87 MG/DL (ref 70–99)
PERFORMED ON: NORMAL

## 2020-09-30 PROCEDURE — 3430000000 HC RX DIAGNOSTIC RADIOPHARMACEUTICAL: Performed by: INTERNAL MEDICINE

## 2020-09-30 PROCEDURE — A9552 F18 FDG: HCPCS | Performed by: INTERNAL MEDICINE

## 2020-09-30 PROCEDURE — 78815 PET IMAGE W/CT SKULL-THIGH: CPT

## 2020-09-30 PROCEDURE — 82947 ASSAY GLUCOSE BLOOD QUANT: CPT

## 2020-09-30 RX ORDER — FLUDEOXYGLUCOSE F 18 200 MCI/ML
10 INJECTION, SOLUTION INTRAVENOUS
Status: COMPLETED | OUTPATIENT
Start: 2020-09-30 | End: 2020-09-30

## 2020-09-30 RX ADMIN — FLUDEOXYGLUCOSE F 18 10 MILLICURIE: 200 INJECTION, SOLUTION INTRAVENOUS at 10:45

## 2020-10-01 DIAGNOSIS — R93.0 ABNORMAL FINDINGS ON DIAGNOSTIC IMAGING OF SKULL AND HEAD, NOT ELSEWHERE CLASSIFIED: ICD-10-CM

## 2020-10-01 DIAGNOSIS — R91.1 LEFT UPPER LOBE PULMONARY NODULE: Primary | ICD-10-CM

## 2020-10-06 ENCOUNTER — TRANSCRIBE ORDERS (OUTPATIENT)
Dept: ADMINISTRATIVE | Facility: HOSPITAL | Age: 60
End: 2020-10-06

## 2020-10-06 DIAGNOSIS — Z01.818 PRE-OP TESTING: Primary | ICD-10-CM

## 2020-10-08 ENCOUNTER — OFFICE VISIT (OUTPATIENT)
Dept: CARDIAC SURGERY | Facility: CLINIC | Age: 60
End: 2020-10-08

## 2020-10-08 VITALS
BODY MASS INDEX: 25.31 KG/M2 | WEIGHT: 191 LBS | HEART RATE: 69 BPM | SYSTOLIC BLOOD PRESSURE: 110 MMHG | OXYGEN SATURATION: 100 % | DIASTOLIC BLOOD PRESSURE: 70 MMHG | HEIGHT: 73 IN

## 2020-10-08 DIAGNOSIS — R91.1 SOLITARY PULMONARY NODULE: Primary | ICD-10-CM

## 2020-10-08 PROCEDURE — 99205 OFFICE O/P NEW HI 60 MIN: CPT | Performed by: SURGERY

## 2020-10-08 RX ORDER — PREDNISONE 20 MG/1
20 TABLET ORAL AS NEEDED
COMMUNITY
Start: 2020-09-17

## 2020-10-08 RX ORDER — ELETRIPTAN HYDROBROMIDE 40 MG/1
TABLET, FILM COATED ORAL
COMMUNITY
Start: 2020-09-17

## 2020-10-08 NOTE — PROGRESS NOTES
Thoracic Surgery Consultation    Referring Physician: Dr. Jimbo Adam    Primary Care Physician:     Chief Complaint   Patient presents with   • Lung Nodule     New patient from [facility].          Subjective     Mr. Anaya is a 60-year-old male who presents with a left upper lobe lung nodule.  He has been followed by Dr. Jimbo Adam, the nodule has been enlarging and is therefore referred to me for possible treatment.  This nodule was originally found on a screening CT scan.  Patient is a smoker and continues to smoke.  He smoked 1 pack/day for 40 years.  He works in [employer], but uses protective gear.  He has an occasional nonproductive cough.  He denies hemoptysis, fevers, chills, unexpected weight loss, and night sweats.  He has no personal history of cancer.  His mother  at the age of 72 with lung cancer, she was also a smoker.  Patient underwent a PET scan which showed FDG avidity at the location of the nodule, there is no evidence of metastatic disease.  Functionally, Mr. Anaya is very active and able to do any activities he wants.        Review of Systems   Constitutional: Negative for fatigue and unexpected weight change.   Respiratory: Negative for cough, chest tightness and shortness of breath.         A complete 10 system review of systems was performed, is negative except stated above.    Past Medical History:   Diagnosis Date   • High cholesterol    • Migraines      Past Surgical History:   Procedure Laterality Date   • BACK SURGERY     • COLONOSCOPY     • HERNIA REPAIR       Family History   Problem Relation Age of Onset   • Lung cancer Mother    • Breast cancer Father      Social History     Tobacco Use   • Smoking status: Current Every Day Smoker     Packs/day: 1.00     Years: 40.00     Pack years: 40.00     Types: Cigarettes   • Smokeless tobacco: Never Used   Substance Use Topics   • Alcohol use: Not Currently   • Drug use: Never     Current Outpatient Medications   Medication Sig  "Dispense Refill   • aspirin 81 MG tablet Take 81 mg by mouth.     • butalbital-acetaminophen-caffeine (FIORICET, ESGIC) -40 MG per tablet TAKE 1 TABLET BY MOUTH TWICE DAILY AS NEEDED FOR HEADACHE     • melatonin 3 MG tablet Take 3 mg by mouth.     • predniSONE (DELTASONE) 20 MG tablet      • rosuvastatin (CRESTOR) 10 MG tablet Take 10 mg by mouth.     • sildenafil (VIAGRA) 50 MG tablet Take 50 mg by mouth.     • SUMAtriptan (IMITREX) 100 MG tablet As directed     • SUMAtriptan Succinate (IMITREX) 6 MG/0.5ML injection INJECT 1 SYRINGE AT ONSET OF A HEADACHE, MAY REPEAT IN 2 HOURS IF HEADACHE PERSISTS. MAX OF 2 IN 24 HOURS OR 4 WEEKLY     • verapamil SR (CALAN-SR) 240 MG CR tablet TAKE 1 TABLET BY MOUTH TWICE DAILY     • eletriptan (RELPAX) 40 MG tablet TK ONE T PO AS NEEDED FOR MIGRAINE. CAN REPEAT IN TWO HOURS. MAX IS 2 IN 24 HOURS AND 4 IN A WEEK       No current facility-administered medications for this visit.      Allergies:  Patient has no known allergies.    Objective      Vital Signs  Visit Vitals  /70 (BP Location: Left arm, Patient Position: Sitting, Cuff Size: Adult)   Pulse 69   Ht 185.4 cm (73\")   Wt 86.6 kg (191 lb)   SpO2 100%   BMI 25.20 kg/m²         Physical Exam  Constitutional:       General: He is not in acute distress.     Appearance: He is well-developed. He is not diaphoretic.   HENT:      Head: Normocephalic and atraumatic.      Right Ear: External ear normal.      Left Ear: External ear normal.   Eyes:      General:         Right eye: No discharge.         Left eye: No discharge.      Pupils: Pupils are equal, round, and reactive to light.   Neck:      Musculoskeletal: Normal range of motion and neck supple.      Vascular: No JVD.      Trachea: No tracheal deviation.   Cardiovascular:      Rate and Rhythm: Normal rate and regular rhythm.      Heart sounds: Normal heart sounds. No murmur.   Pulmonary:      Effort: Pulmonary effort is normal. No respiratory distress.      Breath " sounds: Normal breath sounds. No stridor. No wheezing.   Abdominal:      General: There is no distension.      Palpations: Abdomen is soft.      Tenderness: There is no abdominal tenderness. There is no guarding.   Musculoskeletal: Normal range of motion.         General: No tenderness or deformity.   Skin:     General: Skin is warm and dry.      Capillary Refill: Capillary refill takes less than 2 seconds.      Coloration: Skin is not pale.      Findings: No erythema or rash.   Neurological:      Mental Status: He is alert and oriented to person, place, and time.      Motor: No abnormal muscle tone.      Coordination: Coordination normal.   Psychiatric:         Behavior: Behavior normal.         Thought Content: Thought content normal.         Judgment: Judgment normal.         Results Review:     WBC   Date Value Ref Range Status   08/14/2020 10.0 4.8 - 10.8 K/uL Final     RBC   Date Value Ref Range Status   08/14/2020 4.50 (L) 4.70 - 6.10 M/uL Final     Hemoglobin   Date Value Ref Range Status   08/14/2020 15.1 14.0 - 18.0 g/dL Final     Hematocrit   Date Value Ref Range Status   08/14/2020 45.4 42.0 - 52.0 % Final     MCV   Date Value Ref Range Status   08/14/2020 100.9 (H) 80.0 - 94.0 fL Final     MCH   Date Value Ref Range Status   08/14/2020 33.6 (H) 27.0 - 31.0 pg Final     MCHC   Date Value Ref Range Status   08/14/2020 33.3 33.0 - 37.0 g/dL Final     RDW   Date Value Ref Range Status   08/14/2020 13.3 11.5 - 14.5 % Final     MPV   Date Value Ref Range Status   08/14/2020 9.7 9.4 - 12.4 fL Final     Platelets   Date Value Ref Range Status   08/14/2020 247 130 - 400 K/uL Final     Neutrophil Rel %   Date Value Ref Range Status   09/17/2019 51.0 50.0 - 65.0 % Final     Lymphocyte Rel %   Date Value Ref Range Status   09/17/2019 37.5 20.0 - 40.0 % Final     Monocyte Rel %   Date Value Ref Range Status   09/17/2019 8.1 0.0 - 10.0 % Final     Eosinophil Rel %   Date Value Ref Range Status   09/17/2019 2.5 0.0 -  5.0 % Final     Basophil Rel %   Date Value Ref Range Status   09/17/2019 0.7 0.0 - 1.0 % Final     Neutrophils Absolute   Date Value Ref Range Status   09/17/2019 5.4 1.5 - 7.5 K/uL Final     Lymphocytes Absolute   Date Value Ref Range Status   09/17/2019 4.0 1.1 - 4.5 K/uL Final     Monocytes Absolute   Date Value Ref Range Status   09/17/2019 0.90 0.00 - 0.90 K/uL Final     Eosinophils Absolute   Date Value Ref Range Status   09/17/2019 0.30 0.00 - 0.60 K/uL Final     Basophils Absolute   Date Value Ref Range Status   09/17/2019 0.10 0.00 - 0.20 K/uL Final     Immature Grans, Absolute   Date Value Ref Range Status   09/17/2019 0.0 K/uL Final     Glucose   Date Value Ref Range Status   08/14/2020 89 74 - 109 mg/dL Final     Sodium   Date Value Ref Range Status   08/14/2020 143 136 - 145 mmol/L Final     Potassium   Date Value Ref Range Status   08/14/2020 4.8 3.5 - 5.0 mmol/L Final     CO2   Date Value Ref Range Status   08/14/2020 23 22 - 29 mmol/L Final     Chloride   Date Value Ref Range Status   08/14/2020 106 98 - 111 mmol/L Final     Anion Gap   Date Value Ref Range Status   08/14/2020 14 7 - 19 mmol/L Final     Creatinine   Date Value Ref Range Status   08/14/2020 1 0.5 - 1.2 mg/dL Final     BUN   Date Value Ref Range Status   08/14/2020 10 6 - 20 mg/dL Final     Calcium   Date Value Ref Range Status   08/14/2020 9.0 8.6 - 10.0 mg/dL Final     eGFR Non  Am   Date Value Ref Range Status   08/14/2020 >60 >60 Final     Comment:     This calculation may be inaccurate for patients under the age of 18 years.  For ages 18 and older, a GFR >60 mL/min/1.73m2 (not corrected for weight) is  valid for stable renal function.     Alkaline Phosphatase   Date Value Ref Range Status   08/14/2020 88 40 - 130 U/L Final     Total Protein   Date Value Ref Range Status   08/14/2020 6.6 6.6 - 8.7 g/dL Final     ALT (SGPT)   Date Value Ref Range Status   08/14/2020 12 5 - 41 U/L Final     AST (SGOT)   Date Value Ref Range  Status   08/14/2020 19 5 - 40 U/L Final     Total Bilirubin   Date Value Ref Range Status   08/14/2020 0.3 0.2 - 1.2 mg/dL Final     Albumin   Date Value Ref Range Status   08/14/2020 4.2 3.5 - 5.2 g/dL Final        I reviewed the patient's clinical results and discussed with patient.    CT Chest: Impression:  1. Interval increase in size of a spiculated nodule in the left upper  lobe, concerning for neoplasm.  2. Additional noncalcified pulmonary nodules throughout the lungs  appear stable to slightly decreased in size and should be followed on  subsequent CT exams.  3. Emphysema.  4. Atherosclerosis of the aorta and coronary arteries.  5. No suspicious lymphadenopathy.  Signed by Dr Mike Juares on 9/16/2020 2:52 PM    PET Scan: 1. 3 pulmonary nodules are identified. This includes a 1.2 cm left  upper lobe nodule which displays a low level PET uptake (SUV max 1.9).  This lesion could certainly reflect a primary lung malignancy, perhaps  more indolent subtype given the low level uptake.   2. 2 other right lung nodules nodules, measuring 1.0 cm and 0.7 cm,  demonstrate no measurable PET uptake.  3. There is a focal rounded radiotracer uptake in the posterior  nasopharynx (SUV max 4.2). This could reflect lymphoid tissue in the  posterior nasopharynx. Direct visualization of the posterior  nasopharynx should be considered to help exclude squamous carcinoma.  4. No suspicious adenopathy.  Signed by Dr Frederic Louie on 9/30/2020 2:14 PM    I personally reviewed images of following exams, the following is my interpretation:    CT Chest: Spiculated left upper lobe nodule in the superior segments, away from visceral pleura but likely palpable.  Other small nodules in right lung that are stable in size  PET Scan: FDG avidity of left upper lobe nodule, no activity in mediastinum, no activity at sites of other nodules    I personally reviewed following outside hospital records:    Problem List Items Addressed This Visit            Bilateral carotid artery stenosis      BPH (benign prostatic hyperplasia)      Nicotine dependence      Relevant Orders      NM Pet Skull Base To Mid Thigh                Other Visit Diagnoses      Left upper lobe pulmonary nodule    -  Primary     Relevant Orders     NM Pet Skull Base To Mid Thigh     Centrilobular emphysema (CMS/HCC)           lung nodule of concern is increasing.  Discussed differential dx of granuloma vs malignancy vs other indolent benign process     Patient's Body mass index is 25.15 kg/m². BMI is within normal parameters. No follow-up required..        We will get PET to better characterize lung nodule, follow up by phone  Smoking cessation recommended, especially if nodule is to be resected  No need for rx copd currently in absence of symptoms  Should we need to treat copd, need to be careful with anticholinergics which can aggravate prostate symptoms.  Will need to get PFT if surgery is contemplated  Will consider britt/robotic bronch if PET scan indeterminate        Electronically signed by Jimbo Adam MD, 9/21/2020, 09:47 CDT    12    Carlos Anaya  reports that he has been smoking cigarettes. He has a 40.00 pack-year smoking history. He has never used smokeless tobacco.. I have educated him on the risk of diseases from using tobacco products such as cancer, COPD, heart disease and operative risks.     I advised him to quit and he is willing to quit. We have discussed the following method/s for tobacco cessation:  OTC Cessation Products Prescription Medicaiton.  Together we have set a quit date for 1 week from today.  He will follow up with me in a couple of weeks at time of surgery . or sooner to check on his progress.    I spent 10 minutes counseling the patient.             Assessment/Plan     Mr. Anaya is a 60-year-old male with a left upper lobe nodule that is concerning for a primary lung malignancy.  He is at high risk for lung cancer given his smoking history.  This nodule  is increased in size and low-level FDG activity.  He has other nodules that are stable in size with no activity, although these are is too small to characterize with PET scan.  I suspect Mr. Anaya's pulmonary reserve is very good given his level of activity on a daily basis.  We will confirm this with PFTs.  Given the concern for malignancy of the left upper lobe nodule, I think it is prudent to proceed with surgical treatment.     Clinical Stage if Malignancy: [id number]  [place] Nodule Risk of Malignancy: 28.6%    We discussed treatment options including a preoperative biopsy, radiotherapy, surgery, or watchful waiting.  We also discussed the differential diagnosis of a lung nodule to include granulomatous disease, malignancy, scar tissue, infection or inflammation.  After discussion we decided the best treatment option would be robot-assisted wedge resection followed by lobectomy if frozen section consistent with carcinoma.  I would also sample mediastinal lymph nodes at the same time.  After confirming adequate lung reserve with the PFTs, we will find a date for surgery.    We discussed the risk and benefits of robotic wedge resection and possible lobectomy with lymph node sampling.   The risk of surgery were discussed including bleeding, infection, injury to large vessels, need for transfusion, need to convert to an open procedure.  There is also a risk of pneumonia, prolonged airleak, anesthesia risk, chronic pain and/or death.    We also discussed that these risks are increased if the patient does not quit smoking.  These were all discussed with the patient and they agree to proceed.    Thank you for trusting me with the care of Mr. Anaya.  Please do not hesitate to call with any questions or concerns.    Torrey Morrow M.D.  Cardiothoracic Surgeon

## 2020-10-09 ENCOUNTER — LAB (OUTPATIENT)
Dept: LAB | Facility: HOSPITAL | Age: 60
End: 2020-10-09

## 2020-10-09 PROCEDURE — C9803 HOPD COVID-19 SPEC COLLECT: HCPCS | Performed by: OTOLARYNGOLOGY

## 2020-10-09 PROCEDURE — U0003 INFECTIOUS AGENT DETECTION BY NUCLEIC ACID (DNA OR RNA); SEVERE ACUTE RESPIRATORY SYNDROME CORONAVIRUS 2 (SARS-COV-2) (CORONAVIRUS DISEASE [COVID-19]), AMPLIFIED PROBE TECHNIQUE, MAKING USE OF HIGH THROUGHPUT TECHNOLOGIES AS DESCRIBED BY CMS-2020-01-R: HCPCS | Performed by: OTOLARYNGOLOGY

## 2020-10-10 LAB
COVID LABCORP PRIORITY: NORMAL
SARS-COV-2 RNA RESP QL NAA+PROBE: NOT DETECTED

## 2020-10-12 DIAGNOSIS — Z87.891 FORMER SMOKER: Primary | ICD-10-CM

## 2020-10-12 NOTE — PROGRESS NOTES
YOB: 1960  Location: [facility]  Location Address: [address] [place] [address], [facility], [address] [place] [address]  Location Phone: [phone]    Chief Complaint   Patient presents with   • Sinus Problem     Positive  PET        History of Present Illness  Carlos Anaya is a 60 y.o. male.  Carlos Anaya is here for evaluation of ENT complaints. The patient has had problems with a lung nodule and a PET/CT was done. Patient had abnormality found in the posterior nasopharynx with SUV of 4.2. Patient has no clinical symptoms. He has reflux for which he takes Tums and has never had endoscopy. He denies cough, sore throat, hoarseness, dysphagia, sinus pressure, nasal congestion, nasal drainage and postnasal drip. He is scheduled on 10/27/20 for left thoracoscopy with Daija with wedge resection lobectomy mediastinal lymph node resection and bronchoscopy with Dr. Morrow.     He denies epistaxis or any other nasal symptoms.    I have personally reviewed the information imported into the chart during this visit.      I have personally reviewed the review of systems.                   Past Medical History:   Diagnosis Date   • High cholesterol    • Migraines        Past Surgical History:   Procedure Laterality Date   • BACK SURGERY     • COLONOSCOPY     • HERNIA REPAIR         Outpatient Medications Marked as Taking for the 10/13/20 encounter (Office Visit) with Rick Brown MD   Medication Sig Dispense Refill   • aspirin 81 MG tablet Take 81 mg by mouth.     • butalbital-acetaminophen-caffeine (FIORICET, ESGIC) -40 MG per tablet TAKE 1 TABLET BY MOUTH TWICE DAILY AS NEEDED FOR HEADACHE     • eletriptan (RELPAX) 40 MG tablet TK ONE T PO AS NEEDED FOR MIGRAINE. CAN REPEAT IN TWO HOURS. MAX IS 2 IN 24 HOURS AND 4 IN A WEEK     • melatonin 3 MG tablet Take 3 mg by mouth.     • predniSONE (DELTASONE) 20 MG tablet      • rosuvastatin (CRESTOR) 10 MG tablet Take 10 mg by mouth.     • sildenafil (VIAGRA) 50 MG tablet  Take 50 mg by mouth.     • SUMAtriptan (IMITREX) 100 MG tablet As directed     • SUMAtriptan Succinate (IMITREX) 6 MG/0.5ML injection INJECT 1 SYRINGE AT ONSET OF A HEADACHE, MAY REPEAT IN 2 HOURS IF HEADACHE PERSISTS. MAX OF 2 IN 24 HOURS OR 4 WEEKLY     • verapamil SR (CALAN-SR) 240 MG CR tablet TAKE 1 TABLET BY MOUTH TWICE DAILY         Patient has no known allergies.    Family History   Problem Relation Age of Onset   • Lung cancer Mother    • Breast cancer Father        Social History     Socioeconomic History   • Marital status:      Spouse name: Not on file   • Number of children: Not on file   • Years of education: Not on file   • Highest education level: Not on file   Tobacco Use   • Smoking status: Current Every Day Smoker     Packs/day: 1.00     Years: 40.00     Pack years: 40.00     Types: Cigarettes   • Smokeless tobacco: Never Used   Substance and Sexual Activity   • Alcohol use: Not Currently   • Drug use: Never   • Sexual activity: Defer       Review of Systems   Constitutional: Negative.    HENT: Negative.    Eyes: Negative.    Respiratory: Negative.    Cardiovascular: Negative.    Gastrointestinal:        Reflux   Endocrine: Negative.    Genitourinary: Negative.    Musculoskeletal: Negative.    Skin: Negative.    Allergic/Immunologic: Negative.    Neurological: Negative.    Hematological: Negative.    Psychiatric/Behavioral: Negative.        Vitals:    10/13/20 1349   BP: 119/65   Pulse: 61   Resp: 20   Temp: 98 °F (36.7 °C)       Body mass index is 24.8 kg/m².    Objective     Physical Exam  CONSTITUTIONAL: well nourished, well-developed, alert, oriented, in no acute distress     COMMUNICATION AND VOICE: able to communicate normally, normal voice quality    HEAD: normocephalic, no lesions, atraumatic, no tenderness, no masses     FACE: appearance normal, no lesions, no tenderness, no deformities, facial motion symmetric    EYES: ocular motility normal, eyelids normal, orbits normal, no  proptosis, conjunctiva normal , pupils equal, round     EARS:  Hearing: hearing to conversational voice intact bilaterally aided  External Ears: normal bilaterally, no lesions  TMs  AS-  AD-    NOSE:  External Nose: external nasal structure normal, no tenderness on palpation, no nasal discharge, no lesions, no evidence of trauma, nostrils patent   Intranasal exam: See endoscopy    ORAL:  Lips: upper and lower lips without lesion   OC/OP-clear without mass or lesion    NECK:  Inspection and Palpation: neck appearance normal, no masses or tenderness    CHEST/RESPIRATORY: normal respiratory effort     CARDIOVASCULAR: no cyanosis or edema     NEUROLOGICAL/PSYCHIATRIC: oriented to time, place and person, mood normal, affect appropriate, CN II-XII intact grossly    Assessment/Plan   Diagnoses and all orders for this visit:    1. Neoplasm of uncertain behavior (Primary)  Comments:  Nasopharynx  Orders:  -     Case Request; Standing  -     oxymetazoline (AFRIN) nasal spray 2 spray  -     Case Request    2. Solitary pulmonary nodule  -     Case Request; Standing  -     oxymetazoline (AFRIN) nasal spray 2 spray  -     Case Request    Other orders  -     Follow Anesthesia Guidelines / Protocol; Future  -     Obtain Informed Consent; Future  -     Follow Anesthesia Guidelines / Protocol; Standing  -     NPO Diet; Standing  -     Verify NPO Status; Standing  -     Obtain Informed Consent; Standing  -     SCD (Sequential Compression Device) - To Be Placed on Patient in Pre-Op; Standing      Direct laryngoscopy, rigid nasal endoscopy with possible biopsy (N/A)  Orders Placed This Encounter   Procedures   • Follow Anesthesia Guidelines / Protocol     Standing Status:   Future   • Obtain Informed Consent     Standing Status:   Future     Order Specific Question:   Informed Consent Given For     Answer:   Direct laryngoscopy, rigid nasal endoscopy with possible biopsy     Return for postop.       Patient Instructions   DIRECT  LARYNGOSCOPY, NASOPHARYNGOSCOPY AND RIGID NASAL ENDOSCOPY WITH BIOPSY: The risks and benefits were explained including but not limited to pain, bleeding, infection, (including possible mediastinitis), the risks of the general anesthesia, pain, temporary or permanent hoarseness, airway loss, and/or tooth injury. Questions were answered. No guarantees were made or implied.      Patient and family were instructed on the proper use of scheduled prescription drugs including their impact on driving and the potential effects during pregnancy.  The potential for overdose was discussed and their safe storage and proper disposal.  The website www.[place] which contains other materials in this regard.

## 2020-10-13 ENCOUNTER — TELEPHONE (OUTPATIENT)
Dept: CARDIAC SURGERY | Facility: CLINIC | Age: 60
End: 2020-10-13

## 2020-10-13 ENCOUNTER — PREP FOR SURGERY (OUTPATIENT)
Dept: OTHER | Facility: HOSPITAL | Age: 60
End: 2020-10-13

## 2020-10-13 ENCOUNTER — OFFICE VISIT (OUTPATIENT)
Dept: OTOLARYNGOLOGY | Facility: CLINIC | Age: 60
End: 2020-10-13

## 2020-10-13 VITALS
WEIGHT: 188 LBS | BODY MASS INDEX: 24.92 KG/M2 | TEMPERATURE: 98 F | HEART RATE: 61 BPM | HEIGHT: 73 IN | DIASTOLIC BLOOD PRESSURE: 65 MMHG | SYSTOLIC BLOOD PRESSURE: 119 MMHG | RESPIRATION RATE: 20 BRPM

## 2020-10-13 DIAGNOSIS — R91.1 SOLITARY PULMONARY NODULE: Primary | ICD-10-CM

## 2020-10-13 DIAGNOSIS — D48.9 NEOPLASM OF UNCERTAIN BEHAVIOR: Primary | ICD-10-CM

## 2020-10-13 DIAGNOSIS — R91.1 SOLITARY PULMONARY NODULE: ICD-10-CM

## 2020-10-13 PROCEDURE — 99203 OFFICE O/P NEW LOW 30 MIN: CPT | Performed by: OTOLARYNGOLOGY

## 2020-10-13 PROCEDURE — 31575 DIAGNOSTIC LARYNGOSCOPY: CPT | Performed by: OTOLARYNGOLOGY

## 2020-10-13 RX ORDER — HEPARIN SODIUM 5000 [USP'U]/ML
5000 INJECTION, SOLUTION INTRAVENOUS; SUBCUTANEOUS ONCE
Status: CANCELLED | OUTPATIENT
Start: 2020-10-27

## 2020-10-13 RX ORDER — SODIUM CHLORIDE 0.9 % (FLUSH) 0.9 %
3 SYRINGE (ML) INJECTION EVERY 12 HOURS SCHEDULED
Status: CANCELLED | OUTPATIENT
Start: 2020-10-13

## 2020-10-13 RX ORDER — ALBUTEROL SULFATE 1.25 MG/3ML
1.25 SOLUTION RESPIRATORY (INHALATION)
Status: CANCELLED | OUTPATIENT
Start: 2020-10-13

## 2020-10-13 RX ORDER — CEFAZOLIN SODIUM 2 G/50ML
2 SOLUTION INTRAVENOUS ONCE
Status: CANCELLED | OUTPATIENT
Start: 2020-10-27

## 2020-10-13 RX ORDER — SODIUM CHLORIDE 0.9 % (FLUSH) 0.9 %
10 SYRINGE (ML) INJECTION AS NEEDED
Status: CANCELLED | OUTPATIENT
Start: 2020-10-13

## 2020-10-13 RX ORDER — OXYMETAZOLINE HYDROCHLORIDE 0.05 G/100ML
2 SPRAY NASAL
Status: CANCELLED | OUTPATIENT
Start: 2020-10-13 | End: 2020-10-16

## 2020-10-13 NOTE — PATIENT INSTRUCTIONS
DIRECT LARYNGOSCOPY, NASOPHARYNGOSCOPY AND RIGID NASAL ENDOSCOPY WITH BIOPSY: The risks and benefits were explained including but not limited to pain, bleeding, infection, (including possible mediastinitis), the risks of the general anesthesia, pain, temporary or permanent hoarseness, airway loss, and/or tooth injury. Questions were answered. No guarantees were made or implied.      Patient and family were instructed on the proper use of scheduled prescription drugs including their impact on driving and the potential effects during pregnancy.  The potential for overdose was discussed and their safe storage and proper disposal.  The website www.[place].gov which contains other materials in this regard.

## 2020-10-13 NOTE — PROGRESS NOTES
OPERATIVE NOTE:  Carlos Anaya    DATE OF PROCEDURE: 10/13/2020    PROCEDURE:   Flexible Fiberoptic Laryngoscopy    ANESTHESIA:  None    REASON FOR PROCEDURE:  Procedure was recommend for suspicious clinical behavior and PET scan indicative of a possible neoplasm in the nasopharynx  Risks, benefits and alternatives were discussed.      DETAILS of OPERATION:  The patient was seated in the exam chair.  A flexible fiberoptic laryngoscopy was performed through the left nasal cavity.  The scope was introduced into the left nasal cavity and directed to the level of the nasopharynx, glottis, examining the structures of the oropharynx, base of tongue, vallecula, supraglottic larynx, glottic larynx, and hypopharynx.      FINDINGS:  Mucosal surfaces:   The mucosal surfaces demonstrated normal mucosa surfaces with mild inflammation    Nasopharynx:  Moderate amount of centrally located lymphoid tissue not involving the fossa of Rosenmueller bilaterally.  Mild erythema is noted without ulceration or cavitation.    Base of tongue:  The base of tongue was found to have no mass or lesion.    Epiglottis:  The epiglottis was found to have no mass or lesion.    Aryepligottic fold:  The AE folds were found to have no mass or lesion.    False Vocal Fold:  The false cords were found to have no mass or lesion.    True Vocal Cord:  The true vocal cords were found to have no mass or lesion. Both true vocal cords adduct and abduct normally    Arytenoid:   The arytenoids were found to have no mass or lesion.    Hypopharynx:  The hypopharynx was found to have no mass or lesion.    The patient tolerated procedure well.

## 2020-10-15 ENCOUNTER — TRANSCRIBE ORDERS (OUTPATIENT)
Dept: ADMINISTRATIVE | Facility: HOSPITAL | Age: 60
End: 2020-10-15

## 2020-10-15 DIAGNOSIS — Z01.818 PRE-OP TESTING: Primary | ICD-10-CM

## 2020-10-17 ENCOUNTER — LAB (OUTPATIENT)
Dept: LAB | Facility: HOSPITAL | Age: 60
End: 2020-10-17

## 2020-10-17 PROCEDURE — U0003 INFECTIOUS AGENT DETECTION BY NUCLEIC ACID (DNA OR RNA); SEVERE ACUTE RESPIRATORY SYNDROME CORONAVIRUS 2 (SARS-COV-2) (CORONAVIRUS DISEASE [COVID-19]), AMPLIFIED PROBE TECHNIQUE, MAKING USE OF HIGH THROUGHPUT TECHNOLOGIES AS DESCRIBED BY CMS-2020-01-R: HCPCS | Performed by: SURGERY

## 2020-10-17 PROCEDURE — C9803 HOPD COVID-19 SPEC COLLECT: HCPCS | Performed by: SURGERY

## 2020-10-18 LAB
COVID LABCORP PRIORITY: NORMAL
SARS-COV-2 RNA RESP QL NAA+PROBE: NOT DETECTED

## 2020-10-20 ENCOUNTER — HOSPITAL ENCOUNTER (OUTPATIENT)
Dept: GENERAL RADIOLOGY | Facility: HOSPITAL | Age: 60
Discharge: HOME | End: 2020-10-20

## 2020-10-20 ENCOUNTER — HOSPITAL ENCOUNTER (OUTPATIENT)
Dept: PULMONOLOGY | Facility: HOSPITAL | Age: 60
Discharge: HOME | End: 2020-10-20

## 2020-10-20 ENCOUNTER — APPOINTMENT (OUTPATIENT)
Dept: PREADMISSION TESTING | Facility: HOSPITAL | Age: 60
End: 2020-10-20

## 2020-10-20 VITALS
WEIGHT: 191.58 LBS | SYSTOLIC BLOOD PRESSURE: 116 MMHG | DIASTOLIC BLOOD PRESSURE: 57 MMHG | RESPIRATION RATE: 16 BRPM | BODY MASS INDEX: 25.39 KG/M2 | OXYGEN SATURATION: 98 % | HEIGHT: 73 IN | HEART RATE: 68 BPM

## 2020-10-20 DIAGNOSIS — Z87.891 FORMER SMOKER: ICD-10-CM

## 2020-10-20 DIAGNOSIS — R91.1 SOLITARY PULMONARY NODULE: ICD-10-CM

## 2020-10-20 LAB
ALBUMIN SERPL-MCNC: 4.1 G/DL (ref 3.5–5.2)
ALBUMIN/GLOB SERPL: 1.9 G/DL
ALP SERPL-CCNC: 85 U/L (ref 39–117)
ALT SERPL W P-5'-P-CCNC: 12 U/L (ref 1–41)
ANION GAP SERPL CALCULATED.3IONS-SCNC: 7 MMOL/L (ref 5–15)
APTT PPP: 31.6 SECONDS (ref 24.1–35)
ARTERIAL PATENCY WRIST A: ABNORMAL
AST SERPL-CCNC: 19 U/L (ref 1–40)
ATMOSPHERIC PRESS: 752 MMHG
BASE EXCESS BLDA CALC-SCNC: -0.5 MMOL/L (ref 0–2)
BASOPHILS # BLD AUTO: 0.05 10*3/MM3 (ref 0–0.2)
BASOPHILS NFR BLD AUTO: 0.5 % (ref 0–1.5)
BDY SITE: ABNORMAL
BILIRUB SERPL-MCNC: 0.2 MG/DL (ref 0–1.2)
BODY TEMPERATURE: 37 C
BUN SERPL-MCNC: 8 MG/DL (ref 6–20)
BUN/CREAT SERPL: 8.2 (ref 7–25)
CALCIUM SPEC-SCNC: 8.4 MG/DL (ref 8.6–10.5)
CHLORIDE SERPL-SCNC: 110 MMOL/L (ref 98–107)
CO2 SERPL-SCNC: 25 MMOL/L (ref 22–29)
CREAT SERPL-MCNC: 0.97 MG/DL (ref 0.76–1.27)
DEPRECATED RDW RBC AUTO: 47.5 FL (ref 37–54)
EOSINOPHIL # BLD AUTO: 0.31 10*3/MM3 (ref 0–0.4)
EOSINOPHIL NFR BLD AUTO: 3.3 % (ref 0.3–6.2)
ERYTHROCYTE [DISTWIDTH] IN BLOOD BY AUTOMATED COUNT: 13.3 % (ref 12.3–15.4)
GFR SERPL CREATININE-BSD FRML MDRD: 79 ML/MIN/1.73
GLOBULIN UR ELPH-MCNC: 2.2 GM/DL
GLUCOSE SERPL-MCNC: 156 MG/DL (ref 65–99)
HCO3 BLDA-SCNC: 24.1 MMOL/L (ref 20–26)
HCT VFR BLD AUTO: 41.3 % (ref 37.5–51)
HGB BLD-MCNC: 14.1 G/DL (ref 13–17.7)
IMM GRANULOCYTES # BLD AUTO: 0.02 10*3/MM3 (ref 0–0.05)
IMM GRANULOCYTES NFR BLD AUTO: 0.2 % (ref 0–0.5)
INR PPP: 1 (ref 0.91–1.09)
LYMPHOCYTES # BLD AUTO: 3.42 10*3/MM3 (ref 0.7–3.1)
LYMPHOCYTES NFR BLD AUTO: 36.3 % (ref 19.6–45.3)
Lab: ABNORMAL
MCH RBC QN AUTO: 32.9 PG (ref 26.6–33)
MCHC RBC AUTO-ENTMCNC: 34.1 G/DL (ref 31.5–35.7)
MCV RBC AUTO: 96.3 FL (ref 79–97)
MODALITY: ABNORMAL
MONOCYTES # BLD AUTO: 0.71 10*3/MM3 (ref 0.1–0.9)
MONOCYTES NFR BLD AUTO: 7.5 % (ref 5–12)
NEUTROPHILS NFR BLD AUTO: 4.91 10*3/MM3 (ref 1.7–7)
NEUTROPHILS NFR BLD AUTO: 52.2 % (ref 42.7–76)
NRBC BLD AUTO-RTO: 0 /100 WBC (ref 0–0.2)
PCO2 BLDA: 38.5 MM HG (ref 35–45)
PCO2 TEMP ADJ BLD: 38.5 MM HG (ref 35–45)
PH BLDA: 7.41 PH UNITS (ref 7.35–7.45)
PH, TEMP CORRECTED: 7.41 PH UNITS (ref 7.35–7.45)
PLATELET # BLD AUTO: 229 10*3/MM3 (ref 140–450)
PMV BLD AUTO: 9.4 FL (ref 6–12)
PO2 BLDA: 94.7 MM HG (ref 83–108)
PO2 TEMP ADJ BLD: 94.7 MM HG (ref 83–108)
POTASSIUM SERPL-SCNC: 4.1 MMOL/L (ref 3.5–5.2)
PROT SERPL-MCNC: 6.3 G/DL (ref 6–8.5)
PROTHROMBIN TIME: 12.8 SECONDS (ref 11.9–14.6)
RBC # BLD AUTO: 4.29 10*6/MM3 (ref 4.14–5.8)
SAO2 % BLDCOA: 98.5 % (ref 94–99)
SODIUM SERPL-SCNC: 142 MMOL/L (ref 136–145)
VENTILATOR MODE: ABNORMAL
WBC # BLD AUTO: 9.42 10*3/MM3 (ref 3.4–10.8)

## 2020-10-20 PROCEDURE — 94060 EVALUATION OF WHEEZING: CPT | Performed by: INTERNAL MEDICINE

## 2020-10-20 PROCEDURE — 85730 THROMBOPLASTIN TIME PARTIAL: CPT | Performed by: NURSE PRACTITIONER

## 2020-10-20 PROCEDURE — 94060 EVALUATION OF WHEEZING: CPT

## 2020-10-20 PROCEDURE — 94729 DIFFUSING CAPACITY: CPT

## 2020-10-20 PROCEDURE — 94729 DIFFUSING CAPACITY: CPT | Performed by: INTERNAL MEDICINE

## 2020-10-20 PROCEDURE — 94726 PLETHYSMOGRAPHY LUNG VOLUMES: CPT

## 2020-10-20 PROCEDURE — 85025 COMPLETE CBC W/AUTO DIFF WBC: CPT | Performed by: NURSE PRACTITIONER

## 2020-10-20 PROCEDURE — 71046 X-RAY EXAM CHEST 2 VIEWS: CPT

## 2020-10-20 PROCEDURE — 36415 COLL VENOUS BLD VENIPUNCTURE: CPT

## 2020-10-20 PROCEDURE — 82803 BLOOD GASES ANY COMBINATION: CPT

## 2020-10-20 PROCEDURE — 94726 PLETHYSMOGRAPHY LUNG VOLUMES: CPT | Performed by: INTERNAL MEDICINE

## 2020-10-20 PROCEDURE — 80053 COMPREHEN METABOLIC PANEL: CPT | Performed by: NURSE PRACTITIONER

## 2020-10-20 PROCEDURE — 85610 PROTHROMBIN TIME: CPT | Performed by: NURSE PRACTITIONER

## 2020-10-20 PROCEDURE — 36600 WITHDRAWAL OF ARTERIAL BLOOD: CPT

## 2020-10-20 RX ORDER — ALBUTEROL SULFATE 2.5 MG/3ML
2.5 SOLUTION RESPIRATORY (INHALATION) ONCE
Status: COMPLETED | OUTPATIENT
Start: 2020-10-20 | End: 2020-10-20

## 2020-10-20 RX ADMIN — ALBUTEROL SULFATE 2.5 MG: 2.5 SOLUTION RESPIRATORY (INHALATION) at 15:12

## 2020-10-20 NOTE — DISCHARGE INSTRUCTIONS
DAY OF SURGERY INSTRUCTIONS        YOUR SURGEON: dr arteaga, dr ruiz    PROCEDURE: ***direct laryngoscopy ,rigid nasal endoscopy with possible biopsy, left thoracoscopy with davinci robot with wedge resection lobectomy, mediastinal lymph node dissection ,left bronchoscopy    DATE OF SURGERY: ***10/27/2020    ARRIVAL TIME: AS DIRECTED BY OFFICE    YOU MAY TAKE THE FOLLOWING MEDICATION(S) THE MORNING OF SURGERY WITH A SIP OF WATER: ***none needed per anesthesia      ALL OTHER HOME MEDICATION CHECK WITH YOUR PHYSICIAN      DO NOT TAKE ANY ERECTILE DYSFUNCTION MEDICATIONS (EX: CIALIS, VIAGRA) 24 HOURS PRIOR TO SURGERY                      MANAGING PAIN AFTER SURGERY    We know you are probably wondering what your pain will be like after surgery.  Following surgery it is unrealistic to expect you will not have pain.   Pain is how our bodies let us know that something is wrong or cautions us to be careful.  That said, our goal is to make your pain tolerable.    Methods we may use to treat your pain include (oral or IV medications, PCAs, epidurals, nerve blocks, etc.)   While some procedures require IV pain medications for a short time after surgery, transitioning to pain medications by mouth allows for better management of pain.   Your nurse will encourage you to take oral pain medications whenever possible.  IV medications work almost immediately, but only last a short while.  Taking medications by mouth allows for a more constant level of medication in your blood stream for a longer period of time.      Once your pain is out of control it is harder to get back under control.  It is important you are aware when your next dose of pain medication is due.  If you are admitted, your nurse may write the time of your next dose on the white board in your room to help you remember.      We are interested in your pain and encourage you to inform us about aggravating factors during your visit.   Many times a simple repositioning  every few hours can make a big difference.    If your physician says it is okay, do not let your pain prevent you from getting out of bed. Be sure to call your nurse for assistance prior to getting up so you do not fall.      Before surgery, please decide your tolerable pain goal.  These faces help describe the pain ratings we use on a 0-10 scale.   Be prepared to tell us your goal and whether or not you take pain or anxiety medications at home.          BEFORE YOU COME TO THE HOSPITAL  (Pre-op instructions)  • Do not eat, drink, smoke or chew gum after midnight the night before surgery.  This also includes no mints.  • Morning of surgery take only the medicines you have been instructed with a sip of water unless otherwise instructed  by your physician.  • Do not shave, wear makeup or dark nail polish.  • Remove all jewelry including rings.  • Leave anything you consider valuable at home.  • Leave your suitcase in the car until after your surgery.  • Bring the following with you if applicable:  o Picture ID and insurance, Medicare or Medicaid cards  o Co-pay/deductible required by insurance (cash, check, credit card)  o Copy of advance directive, living will or power-of- documents if not brought to PAT  o CPAP or BIPAP mask and tubing  o Relaxation aids ( book, magazine), etc.  o Hearing aids                        ON THE DAY OF SURGERY  · On the day of surgery check in at registration located at the main entrance of the hospital.   ? You will be registered and given a beeper with instructions where to wait in the main lobby.  ? When your beeper lights up and vibrates a member of the Outpatient Surgery staff will meet you at the double doors under the stair steps and escort you to your preoperative room.   · You may have cloth compression devices placed on your legs. These help to prevent blood clots and reduce swelling in your legs.  · An IV may be inserted into one of your veins.  · In the operating room,  "you may be given one or more of the following:  ? A medicine to help you relax (sedative).  ? A medicine to numb the area (local anesthetic).  ? A medicine to make you fall asleep (general anesthetic).  ? A medicine that is injected into an area of your body to numb everything below the injection site (regional anesthetic).  · Your surgical site will be marked or identified.  · You may be given an antibiotic through your IV to help prevent infection.  Contact a health care provider if you:  · Develop a fever of more than 100.4°F (38°C) or other feelings of illness during the 48 hours before your surgery.  · Have symptoms that get worse.  Have questions or concerns about your surgery    General Anesthesia/Surgery, Adult  General anesthesia is the use of medicines to make a person \"go to sleep\" (unconscious) for a medical procedure. General anesthesia must be used for certain procedures, and is often recommended for procedures that:  · Last a long time.  · Require you to be still or in an unusual position.  · Are major and can cause blood loss.  The medicines used for general anesthesia are called general anesthetics. As well as making you unconscious for a certain amount of time, these medicines:  · Prevent pain.  · Control your blood pressure.  · Relax your muscles.  Tell a health care provider about:  · Any allergies you have.  · All medicines you are taking, including vitamins, herbs, eye drops, creams, and over-the-counter medicines.  · Any problems you or family members have had with anesthetic medicines.  · Types of anesthetics you have had in the past.  · Any blood disorders you have.  · Any surgeries you have had.  · Any medical conditions you have.  · Any recent upper respiratory, chest, or ear infections.  · Any history of:  ? Heart or lung conditions, such as heart failure, sleep apnea, asthma, or chronic obstructive pulmonary disease (COPD).  ?  service.  ? Depression or anxiety.  · Any tobacco or " drug use, including marijuana or alcohol use.  · Whether you are pregnant or may be pregnant.  What are the risks?  Generally, this is a safe procedure. However, problems may occur, including:  · Allergic reaction.  · Lung and heart problems.  · Inhaling food or liquid from the stomach into the lungs (aspiration).  · Nerve injury.  · Air in the bloodstream, which can lead to stroke.  · Extreme agitation or confusion (delirium) when you wake up from the anesthetic.  · Waking up during your procedure and being unable to move. This is rare.  These problems are more likely to develop if you are having a major surgery or if you have an advanced or serious medical condition. You can prevent some of these complications by answering all of your health care provider's questions thoroughly and by following all instructions before your procedure.  General anesthesia can cause side effects, including:  · Nausea or vomiting.  · A sore throat from the breathing tube.  · Hoarseness.  · Wheezing or coughing.  · Shaking chills.  · Tiredness.  · Body aches.  · Anxiety.  · Sleepiness or drowsiness.  · Confusion or agitation.  RISKS AND COMPLICATIONS OF SURGERY  Your health care provider will discuss possible risks and complications with you before surgery. Common risks and complications include:    · Problems due to the use of anesthetics.  · Blood loss and replacement (does not apply to minor surgical procedures).  · Temporary increase in pain due to surgery.  · Uncorrected pain or problems that the surgery was meant to correct.  · Infection.  · New damage.    What happens before the procedure?    Medicines  Ask your health care provider about:  · Changing or stopping your regular medicines. This is especially important if you are taking diabetes medicines or blood thinners.  · Taking medicines such as aspirin and ibuprofen. These medicines can thin your blood. Do not take these medicines unless your health care provider tells you to  take them.  · Taking over-the-counter medicines, vitamins, herbs, and supplements. Do not take these during the week before your procedure unless your health care provider approves them.  General instructions  · Starting 3-6 weeks before the procedure, do not use any products that contain nicotine or tobacco, such as cigarettes and e-cigarettes. If you need help quitting, ask your health care provider.  · If you brush your teeth on the morning of the procedure, make sure to spit out all of the toothpaste.  · Tell your health care provider if you become ill or develop a cold, cough, or fever.  · If instructed by your health care provider, bring your sleep apnea device with you on the day of your surgery (if applicable).  · Ask your health care provider if you will be going home the same day, the following day, or after a longer hospital stay.  ? Plan to have someone take you home from the hospital or clinic.  ? Plan to have a responsible adult care for you for at least 24 hours after you leave the hospital or clinic. This is important.  What happens during the procedure?  · You will be given anesthetics through both of the following:  ? A mask placed over your nose and mouth.  ? An IV in one of your veins.  · You may receive a medicine to help you relax (sedative).  · After you are unconscious, a breathing tube may be inserted down your throat to help you breathe. This will be removed before you wake up.  · An anesthesia specialist will stay with you throughout your procedure. He or she will:  ? Keep you comfortable and safe by continuing to give you medicines and adjusting the amount of medicine that you get.  ? Monitor your blood pressure, pulse, and oxygen levels to make sure that the anesthetics do not cause any problems.  The procedure may vary among health care providers and hospitals.  What happens after the procedure?  · Your blood pressure, temperature, heart rate, breathing rate, and blood oxygen level will  be monitored until the medicines you were given have worn off.  · You will wake up in a recovery area. You may wake up slowly.  · If you feel anxious or agitated, you may be given medicine to help you calm down.  · If you will be going home the same day, your health care provider may check to make sure you can walk, drink, and urinate.  · Your health care provider will treat any pain or side effects you have before you go home.  · Do not drive for 24 hours if you were given a sedative.  Summary  · General anesthesia is used to keep you still and prevent pain during a procedure.  · It is important to tell your healthcare provider about your medical history and any surgeries you have had, and previous experience with anesthesia.  · Follow your healthcare provider’s instructions about when to stop eating, drinking, or taking certain medicines before your procedure.  · Plan to have someone take you home from the hospital or clinic.  This information is not intended to replace advice given to you by your health care provider. Make sure you discuss any questions you have with your health care provider.  Document Released: 03/26/2009 Document Revised: 08/03/2018 Document Reviewed: 08/03/2018  [employer] Interactive Patient Education © 2019 [employer]       Fall Prevention in Hospitals, Adult  As a hospital patient, your condition and the treatments you receive can increase your risk for falls. Some additional risk factors for falls in a hospital include:  · Being in an unfamiliar environment.  · Being on bed rest.  · Your surgery.  · Taking certain medicines.  · Your tubing requirements, such as intravenous (IV) therapy or catheters.  It is important that you learn how to decrease fall risks while at the hospital. Below are important tips that can help prevent falls.  SAFETY TIPS FOR PREVENTING FALLS  Talk about your risk of falling.  · Ask your health care provider why you are at risk for falling. Is it your medicine,  illness, tubing placement, or something else?  · Make a plan with your health care provider to keep you safe from falls.  · Ask your health care provider or pharmacist about side effects of your medicines. Some medicines can make you dizzy or affect your coordination.  Ask for help.  · Ask for help before getting out of bed. You may need to press your call button.  · Ask for assistance in getting safely to the toilet.  · Ask for a walker or cane to be put at your bedside. Ask that most of the side rails on your bed be placed up before your health care provider leaves the room.  · Ask family or friends to sit with you.  · Ask for things that are out of your reach, such as your glasses, hearing aids, telephone, bedside table, or call button.  Follow these tips to avoid falling:  · Stay lying or seated, rather than standing, while waiting for help.  · Wear rubber-soled slippers or shoes whenever you walk in the hospital.  · Avoid quick, sudden movements.  ¨ Change positions slowly.  ¨ Sit on the side of your bed before standing.  ¨ Stand up slowly and wait before you start to walk.  · Let your health care provider know if there is a spill on the floor.  · Pay careful attention to the medical equipment, electrical cords, and tubes around you.  · When you need help, use your call button by your bed or in the bathroom. Wait for one of your health care providers to help you.  · If you feel dizzy or unsure of your footing, return to bed and wait for assistance.  · Avoid being distracted by the TV, telephone, or another person in your room.  · Do not lean or support yourself on rolling objects, such as IV poles or bedside tables.     This information is not intended to replace advice given to you by your health care provider. Make sure you discuss any questions you have with your health care provider.     Document Released: 12/15/2001 Document Revised: 01/08/2016 Document Reviewed: 08/25/2013  [employer] Patient  Education ©2016 [employer]       [facility]  CHG 4% Patient Instruction Sheet    Chlorhexidine Before Surgery  Chlorhexidine gluconate (CHG) is a germ-killing (antiseptic) solution that is used to clean the skin. It gets rid of the bacteria that normally live on the skin. Cleaning your skin with CHG before surgery helps lower the risk for infection after surgery.    How to use CHG solution  · You will take 2 showers, one shower the night before surgery, the second shower the morning of surgery before coming to the hospital.  · Use CHG only as told by your health care provider, and follow the instructions on the label.  · Use CHG solution while taking a shower. Follow these steps when using CHG solution (unless your health care provider gives you different instructions):  1. Start the shower.  2. Use your normal soap and shampoo to wash your face and hair.  3. Turn off the shower or move out of the shower stream.  4. Pour the CHG onto a clean washcloth. Do not use any type of brush or rough-edged sponge.  5. Starting at your neck, lather your body down to your toes. Make sure you:  6. Pay special attention to the part of your body where you will be having surgery. Scrub this area for at least 1 minute.  7. Use the full amount of CHG as directed. Usually, this is one half bottle for each shower.  8. Do not use CHG on your head or face. If the solution gets into your ears or eyes, rinse them well with water.  9. Avoid your genital area.  10. Avoid any areas of skin that have broken skin, cuts, or scrapes.  11. Scrub your back and under your arms. Make sure to wash skin folds.  12. Let the lather sit on your skin for 1-2 minutes or as long as told by your health care  provider.  13. Thoroughly rinse your entire body in the shower. Make sure that all body creases and crevices are rinsed well.  14. Dry off with a clean towel. Do not put any substances on your body afterward, such as powder, lotion, or  perfume.  15. Put on clean clothes or pajamas.  16. If it is the night before your surgery, sleep in clean sheets.    What are the risks?  Risks of using CHG include:  · A skin reaction.  · Hearing loss, if CHG gets in your ears.  · Eye injury, if CHG gets in your eyes and is not rinsed out.  · The CHG product catching fire.  Make sure that you avoid smoking and flames after applying CHG to your skin.  Do not use CHG:  · If you have a chlorhexidine allergy or have previously reacted to chlorhexidine.  · On babies younger than 2 months of age.      On the day of surgery, when you are taken to your room in Outpatient Surgery you will be given a CHG prepackaged cloth to wipe the site for your surgery.  How to use CHG prepackaged cloths  · Follow the instructions on the label.  · Use the CHG cloth on clean, dry skin. Follow these steps when using a CHG cloth (unless your health care provider gives you different instructions):  1. Using the CHG cloth, vigorously scrub the part of your body where you will be having surgery. Scrub using a back-and-forth motion for 3 minutes. The area on your body should be completely wet with CHG when you are finished scrubbing.  2. Do not rinse. Discard the cloth and let the area air-dry for 1 minute. Do not put any substances on your body afterward, such as powder, lotion, or perfume.  Contact a health care provider if:  · Your skin gets irritated after scrubbing.  · You have questions about using your solution or cloth.  Get help right away if:  · Your eyes become very red or swollen.  · Your eyes itch badly.  · Your skin itches badly and is red or swollen.  · Your hearing changes.  · You have trouble seeing.  · You have swelling or tingling in your mouth or throat.  · You have trouble breathing.  · You swallow any chlorhexidine.  Summary  · Chlorhexidine gluconate (CHG) is a germ-killing (antiseptic) solution that is used to clean the skin. Cleaning your skin with CHG before surgery  helps lower the risk for infection after surgery.  · You may be given CHG to use at home. It may be in a bottle or in a prepackaged cloth to use on your skin. Carefully follow your health care provider's instructions and the instructions on the product label.  · Do not use CHG if you have a chlorhexidine allergy.  · Contact your health care provider if your skin gets irritated after scrubbing.  This information is not intended to replace advice given to you by your health care provider. Make sure you discuss any questions you have with your health care provider.  Document Released: 09/11/2013 Document Revised: 11/15/2018 Document Reviewed: 11/15/2018  [employer] Interactive Patient Education © 2019 [employer]          PATIENT/FAMILY/RESPONSIBLE PARTY VERBALIZES UNDERSTANDING OF ABOVE EDUCATION.  COPY OF PAIN SCALE GIVEN AND REVIEWED WITH VERBALIZED UNDERSTANDING.

## 2020-10-21 ENCOUNTER — TRANSCRIBE ORDERS (OUTPATIENT)
Dept: ADMINISTRATIVE | Facility: HOSPITAL | Age: 60
End: 2020-10-21

## 2020-10-21 ENCOUNTER — TRANSCRIBE ORDERS (OUTPATIENT)
Dept: CARDIAC SURGERY | Facility: CLINIC | Age: 60
End: 2020-10-21

## 2020-10-21 DIAGNOSIS — Z11.59 SCREENING FOR VIRAL DISEASE: Primary | ICD-10-CM

## 2020-10-24 ENCOUNTER — LAB (OUTPATIENT)
Dept: LAB | Facility: HOSPITAL | Age: 60
End: 2020-10-24

## 2020-10-24 PROCEDURE — U0003 INFECTIOUS AGENT DETECTION BY NUCLEIC ACID (DNA OR RNA); SEVERE ACUTE RESPIRATORY SYNDROME CORONAVIRUS 2 (SARS-COV-2) (CORONAVIRUS DISEASE [COVID-19]), AMPLIFIED PROBE TECHNIQUE, MAKING USE OF HIGH THROUGHPUT TECHNOLOGIES AS DESCRIBED BY CMS-2020-01-R: HCPCS | Performed by: SURGERY

## 2020-10-24 PROCEDURE — C9803 HOPD COVID-19 SPEC COLLECT: HCPCS | Performed by: SURGERY

## 2020-10-25 LAB
COVID LABCORP PRIORITY: NORMAL
SARS-COV-2 RNA RESP QL NAA+PROBE: NOT DETECTED

## 2020-10-27 ENCOUNTER — APPOINTMENT (OUTPATIENT)
Dept: GENERAL RADIOLOGY | Facility: HOSPITAL | Age: 60
End: 2020-10-27

## 2020-10-27 ENCOUNTER — HOSPITAL ENCOUNTER (INPATIENT)
Facility: HOSPITAL | Age: 60
LOS: 3 days | Discharge: HOME | End: 2020-10-30
Attending: SURGERY | Admitting: SURGERY

## 2020-10-27 ENCOUNTER — ANESTHESIA (OUTPATIENT)
Dept: PERIOP | Facility: HOSPITAL | Age: 60
End: 2020-10-27

## 2020-10-27 ENCOUNTER — ANESTHESIA EVENT (OUTPATIENT)
Dept: PERIOP | Facility: HOSPITAL | Age: 60
End: 2020-10-27

## 2020-10-27 DIAGNOSIS — R91.1 SOLITARY PULMONARY NODULE: ICD-10-CM

## 2020-10-27 DIAGNOSIS — D48.9 NEOPLASM OF UNCERTAIN BEHAVIOR: ICD-10-CM

## 2020-10-27 LAB
A-A DO2: 415.6 MMHG
ABO GROUP BLD: NORMAL
ANION GAP SERPL CALCULATED.3IONS-SCNC: 10 MMOL/L (ref 5–15)
ARTERIAL PATENCY WRIST A: ABNORMAL
ATMOSPHERIC PRESS: 755 MMHG
BASE EXCESS BLDA CALC-SCNC: -4.5 MMOL/L (ref 0–2)
BDY SITE: ABNORMAL
BLD GP AB SCN SERPL QL: NEGATIVE
BODY TEMPERATURE: 36.1 C
BUN SERPL-MCNC: 14 MG/DL (ref 6–20)
BUN/CREAT SERPL: 9.9 (ref 7–25)
CA-I BLD-MCNC: 4.33 MG/DL (ref 4.6–5.4)
CALCIUM SPEC-SCNC: 8.5 MG/DL (ref 8.6–10.5)
CHLORIDE SERPL-SCNC: 108 MMOL/L (ref 98–107)
CO2 SERPL-SCNC: 23 MMOL/L (ref 22–29)
COHGB MFR BLD: 1.4 % (ref 0–5)
CREAT SERPL-MCNC: 1.42 MG/DL (ref 0.76–1.27)
DEPRECATED RDW RBC AUTO: 47.5 FL (ref 37–54)
ERYTHROCYTE [DISTWIDTH] IN BLOOD BY AUTOMATED COUNT: 13.2 % (ref 12.3–15.4)
GFR SERPL CREATININE-BSD FRML MDRD: 51 ML/MIN/1.73
GLUCOSE SERPL-MCNC: 185 MG/DL (ref 65–99)
HCO3 BLDA-SCNC: 21.8 MMOL/L (ref 20–26)
HCT VFR BLD AUTO: 39.9 % (ref 37.5–51)
HCT VFR BLD CALC: 43.3 % (ref 38–51)
HGB BLD-MCNC: 13.8 G/DL (ref 13–17.7)
HGB BLDA-MCNC: 14.1 G/DL (ref 14–18)
INHALED O2 CONCENTRATION: 100 %
Lab: ABNORMAL
MCH RBC QN AUTO: 33.4 PG (ref 26.6–33)
MCHC RBC AUTO-ENTMCNC: 34.6 G/DL (ref 31.5–35.7)
MCV RBC AUTO: 96.6 FL (ref 79–97)
METHGB BLD QL: 1.4 % (ref 0–3)
MODALITY: ABNORMAL
NOTE: ABNORMAL
OXYHGB MFR BLDV: 96.7 % (ref 94–99)
PCO2 BLDA: 43.6 MM HG (ref 35–45)
PCO2 TEMP ADJ BLD: 41.9 MM HG (ref 35–45)
PH BLDA: 7.31 PH UNITS (ref 7.35–7.45)
PH, TEMP CORRECTED: 7.32 PH UNITS (ref 7.35–7.45)
PLATELET # BLD AUTO: 200 10*3/MM3 (ref 140–450)
PMV BLD AUTO: 9.1 FL (ref 6–12)
PO2 BLDA: 248 MM HG (ref 83–108)
PO2 TEMP ADJ BLD: 244 MM HG (ref 83–108)
POTASSIUM BLDA-SCNC: 4.3 MMOL/L (ref 3.5–5.2)
POTASSIUM SERPL-SCNC: 4.5 MMOL/L (ref 3.5–5.2)
RBC # BLD AUTO: 4.13 10*6/MM3 (ref 4.14–5.8)
RH BLD: POSITIVE
SAO2 % BLDCOA: 99.5 % (ref 94–99)
SODIUM BLDA-SCNC: 141 MMOL/L (ref 136–145)
SODIUM SERPL-SCNC: 141 MMOL/L (ref 136–145)
T&S EXPIRATION DATE: NORMAL
VENTILATOR MODE: ABNORMAL
WBC # BLD AUTO: 19.91 10*3/MM3 (ref 3.4–10.8)

## 2020-10-27 PROCEDURE — 8E0W4CZ ROBOTIC ASSISTED PROCEDURE OF TRUNK REGION, PERCUTANEOUS ENDOSCOPIC APPROACH: ICD-10-PCS | Performed by: SURGERY

## 2020-10-27 PROCEDURE — C1729 CATH, DRAINAGE: HCPCS | Performed by: SURGERY

## 2020-10-27 PROCEDURE — 0BBG4ZZ EXCISION OF LEFT UPPER LUNG LOBE, PERCUTANEOUS ENDOSCOPIC APPROACH: ICD-10-PCS | Performed by: SURGERY

## 2020-10-27 PROCEDURE — 25010000002 ONDANSETRON PER 1 MG: Performed by: NURSE ANESTHETIST, CERTIFIED REGISTERED

## 2020-10-27 PROCEDURE — 07B74ZX EXCISION OF THORAX LYMPHATIC, PERCUTANEOUS ENDOSCOPIC APPROACH, DIAGNOSTIC: ICD-10-PCS | Performed by: SURGERY

## 2020-10-27 PROCEDURE — 25010000003 BUPIVACAINE LIPOSOME 1.3 % SUSPENSION: Performed by: SURGERY

## 2020-10-27 PROCEDURE — 88331 PATH CONSLTJ SURG 1 BLK 1SPC: CPT | Performed by: PATHOLOGY

## 2020-10-27 PROCEDURE — 32663 THORACOSCOPY W/LOBECTOMY: CPT | Performed by: SURGERY

## 2020-10-27 PROCEDURE — C9290 INJ, BUPIVACAINE LIPOSOME: HCPCS | Performed by: SURGERY

## 2020-10-27 PROCEDURE — 25010000002 DEXAMETHASONE PER 1 MG: Performed by: NURSE ANESTHETIST, CERTIFIED REGISTERED

## 2020-10-27 PROCEDURE — 25010000002 FENTANYL CITRATE (PF) 250 MCG/5ML SOLUTION: Performed by: NURSE ANESTHETIST, CERTIFIED REGISTERED

## 2020-10-27 PROCEDURE — 86901 BLOOD TYPING SEROLOGIC RH(D): CPT | Performed by: NURSE PRACTITIONER

## 2020-10-27 PROCEDURE — 82805 BLOOD GASES W/O2 SATURATION: CPT

## 2020-10-27 PROCEDURE — C9046 COCAINE HCL NASAL SOLUTION: HCPCS | Performed by: OTOLARYNGOLOGY

## 2020-10-27 PROCEDURE — 30100 INTRANASAL BIOPSY: CPT | Performed by: OTOLARYNGOLOGY

## 2020-10-27 PROCEDURE — 88305 TISSUE EXAM BY PATHOLOGIST: CPT | Performed by: SURGERY

## 2020-10-27 PROCEDURE — 88309 TISSUE EXAM BY PATHOLOGIST: CPT | Performed by: SURGERY

## 2020-10-27 PROCEDURE — 25010000002 PROPOFOL 10 MG/ML EMULSION: Performed by: NURSE ANESTHETIST, CERTIFIED REGISTERED

## 2020-10-27 PROCEDURE — 88341 IMHCHEM/IMCYTCHM EA ADD ANTB: CPT | Performed by: SURGERY

## 2020-10-27 PROCEDURE — 25010000002 COCAINE HCL 40 MG/ML SOLUTION: Performed by: OTOLARYNGOLOGY

## 2020-10-27 PROCEDURE — 31525 DX LARYNGOSCOPY EXCL NB: CPT | Performed by: OTOLARYNGOLOGY

## 2020-10-27 PROCEDURE — 88307 TISSUE EXAM BY PATHOLOGIST: CPT | Performed by: SURGERY

## 2020-10-27 PROCEDURE — 25010000002 PHENYLEPHRINE 10 MG/ML SOLUTION 1 ML VIAL: Performed by: NURSE ANESTHETIST, CERTIFIED REGISTERED

## 2020-10-27 PROCEDURE — 0BTG4ZZ RESECTION OF LEFT UPPER LUNG LOBE, PERCUTANEOUS ENDOSCOPIC APPROACH: ICD-10-PCS | Performed by: SURGERY

## 2020-10-27 PROCEDURE — 25010000002 MIDAZOLAM PER 1 MG: Performed by: NURSE ANESTHETIST, CERTIFIED REGISTERED

## 2020-10-27 PROCEDURE — 80048 BASIC METABOLIC PNL TOTAL CA: CPT | Performed by: SURGERY

## 2020-10-27 PROCEDURE — 0BJ08ZZ INSPECTION OF TRACHEOBRONCHIAL TREE, VIA NATURAL OR ARTIFICIAL OPENING ENDOSCOPIC: ICD-10-PCS | Performed by: SURGERY

## 2020-10-27 PROCEDURE — 82375 ASSAY CARBOXYHB QUANT: CPT

## 2020-10-27 PROCEDURE — 71045 X-RAY EXAM CHEST 1 VIEW: CPT

## 2020-10-27 PROCEDURE — 86850 RBC ANTIBODY SCREEN: CPT | Performed by: NURSE PRACTITIONER

## 2020-10-27 PROCEDURE — 94799 UNLISTED PULMONARY SVC/PX: CPT

## 2020-10-27 PROCEDURE — C2615 SEALANT, PULMONARY, LIQUID: HCPCS | Performed by: SURGERY

## 2020-10-27 PROCEDURE — 31231 NASAL ENDOSCOPY DX: CPT | Performed by: OTOLARYNGOLOGY

## 2020-10-27 PROCEDURE — 85027 COMPLETE CBC AUTOMATED: CPT | Performed by: SURGERY

## 2020-10-27 PROCEDURE — 86900 BLOOD TYPING SEROLOGIC ABO: CPT | Performed by: NURSE PRACTITIONER

## 2020-10-27 PROCEDURE — 83050 HGB METHEMOGLOBIN QUAN: CPT

## 2020-10-27 PROCEDURE — 88342 IMHCHEM/IMCYTCHM 1ST ANTB: CPT | Performed by: SURGERY

## 2020-10-27 PROCEDURE — 25010000002 FENTANYL CITRATE (PF) 100 MCG/2ML SOLUTION: Performed by: NURSE ANESTHETIST, CERTIFIED REGISTERED

## 2020-10-27 PROCEDURE — 0CBM8ZX EXCISION OF PHARYNX, VIA NATURAL OR ARTIFICIAL OPENING ENDOSCOPIC, DIAGNOSTIC: ICD-10-PCS | Performed by: OTOLARYNGOLOGY

## 2020-10-27 PROCEDURE — 32668 THORACOSCOPY W/W RESECT DIAG: CPT | Performed by: SURGERY

## 2020-10-27 PROCEDURE — 25010000002 PHENYLEPHRINE HCL 0.8 MG/10ML SOLUTION PREFILLED SYRINGE: Performed by: NURSE ANESTHETIST, CERTIFIED REGISTERED

## 2020-10-27 DEVICE — CLIP LIG HEMOLOK PA LG 6CT PRP: Type: IMPLANTABLE DEVICE | Site: LUNG | Status: FUNCTIONAL

## 2020-10-27 DEVICE — SUREFORM 45 RELOAD GREEN
Type: IMPLANTABLE DEVICE | Site: LUNG | Status: FUNCTIONAL
Brand: SUREFORM

## 2020-10-27 DEVICE — SUREFORM 45 RELOAD WHITE
Type: IMPLANTABLE DEVICE | Site: LUNG | Status: FUNCTIONAL
Brand: SUREFORM

## 2020-10-27 DEVICE — SEALANT PLURAL AIRLEAK PROGEL W/APPL 4ML: Type: IMPLANTABLE DEVICE | Site: LUNG | Status: FUNCTIONAL

## 2020-10-27 DEVICE — ABSORBABLE HEMOSTAT (OXIDIZED REGENERATED CELLULOSE, U.S.P.)
Type: IMPLANTABLE DEVICE | Site: LUNG | Status: FUNCTIONAL
Brand: SURGICEL

## 2020-10-27 DEVICE — STAPLER 60 RELOAD BLUE
Type: IMPLANTABLE DEVICE | Site: LUNG | Status: FUNCTIONAL
Brand: SUREFORM

## 2020-10-27 DEVICE — STAPLER 60 RELOAD BLACK
Type: IMPLANTABLE DEVICE | Site: LUNG | Status: FUNCTIONAL
Brand: SUREFORM

## 2020-10-27 DEVICE — SUREFORM 45 RELOAD BLUE
Type: IMPLANTABLE DEVICE | Site: LUNG | Status: FUNCTIONAL
Brand: SUREFORM

## 2020-10-27 RX ORDER — MIDAZOLAM HYDROCHLORIDE 1 MG/ML
1 INJECTION INTRAMUSCULAR; INTRAVENOUS
Status: COMPLETED | OUTPATIENT
Start: 2020-10-27 | End: 2020-10-27

## 2020-10-27 RX ORDER — SODIUM CHLORIDE, SODIUM LACTATE, POTASSIUM CHLORIDE, CALCIUM CHLORIDE 600; 310; 30; 20 MG/100ML; MG/100ML; MG/100ML; MG/100ML
100 INJECTION, SOLUTION INTRAVENOUS CONTINUOUS
Status: DISCONTINUED | OUTPATIENT
Start: 2020-10-27 | End: 2020-10-27 | Stop reason: HOSPADM

## 2020-10-27 RX ORDER — VERAPAMIL HYDROCHLORIDE 240 MG/1
240 TABLET, FILM COATED, EXTENDED RELEASE ORAL EVERY 12 HOURS SCHEDULED
Status: DISCONTINUED | OUTPATIENT
Start: 2020-10-27 | End: 2020-10-30 | Stop reason: HOSPADM

## 2020-10-27 RX ORDER — ONDANSETRON 2 MG/ML
INJECTION INTRAMUSCULAR; INTRAVENOUS AS NEEDED
Status: DISCONTINUED | OUTPATIENT
Start: 2020-10-27 | End: 2020-10-27 | Stop reason: SURG

## 2020-10-27 RX ORDER — ROCURONIUM BROMIDE 10 MG/ML
INJECTION, SOLUTION INTRAVENOUS AS NEEDED
Status: DISCONTINUED | OUTPATIENT
Start: 2020-10-27 | End: 2020-10-27 | Stop reason: SURG

## 2020-10-27 RX ORDER — VERAPAMIL HYDROCHLORIDE 240 MG/1
240 TABLET, FILM COATED, EXTENDED RELEASE ORAL
Status: DISCONTINUED | OUTPATIENT
Start: 2020-10-28 | End: 2020-10-27

## 2020-10-27 RX ORDER — MAGNESIUM HYDROXIDE 1200 MG/15ML
LIQUID ORAL AS NEEDED
Status: DISCONTINUED | OUTPATIENT
Start: 2020-10-27 | End: 2020-10-27 | Stop reason: HOSPADM

## 2020-10-27 RX ORDER — ONDANSETRON 2 MG/ML
4 INJECTION INTRAMUSCULAR; INTRAVENOUS AS NEEDED
Status: DISCONTINUED | OUTPATIENT
Start: 2020-10-27 | End: 2020-10-27 | Stop reason: HOSPADM

## 2020-10-27 RX ORDER — LIDOCAINE HYDROCHLORIDE AND EPINEPHRINE 10; 10 MG/ML; UG/ML
INJECTION, SOLUTION INFILTRATION; PERINEURAL AS NEEDED
Status: DISCONTINUED | OUTPATIENT
Start: 2020-10-27 | End: 2020-10-27 | Stop reason: HOSPADM

## 2020-10-27 RX ORDER — BUTALBITAL, ACETAMINOPHEN AND CAFFEINE 50; 325; 40 MG/1; MG/1; MG/1
1 TABLET ORAL EVERY 6 HOURS PRN
Status: DISCONTINUED | OUTPATIENT
Start: 2020-10-27 | End: 2020-10-30 | Stop reason: HOSPADM

## 2020-10-27 RX ORDER — METOPROLOL TARTRATE 5 MG/5ML
INJECTION INTRAVENOUS AS NEEDED
Status: DISCONTINUED | OUTPATIENT
Start: 2020-10-27 | End: 2020-10-27 | Stop reason: SURG

## 2020-10-27 RX ORDER — SODIUM CHLORIDE 0.9 % (FLUSH) 0.9 %
3-10 SYRINGE (ML) INJECTION AS NEEDED
Status: DISCONTINUED | OUTPATIENT
Start: 2020-10-27 | End: 2020-10-27 | Stop reason: HOSPADM

## 2020-10-27 RX ORDER — BUPIVACAINE HCL/0.9 % NACL/PF 0.1 %
2 PLASTIC BAG, INJECTION (ML) EPIDURAL ONCE
Status: COMPLETED | OUTPATIENT
Start: 2020-10-27 | End: 2020-10-27

## 2020-10-27 RX ORDER — ACETAMINOPHEN 325 MG/1
650 TABLET ORAL EVERY 4 HOURS PRN
Status: DISCONTINUED | OUTPATIENT
Start: 2020-10-27 | End: 2020-10-30 | Stop reason: HOSPADM

## 2020-10-27 RX ORDER — HEPARIN SODIUM 5000 [USP'U]/ML
5000 INJECTION, SOLUTION INTRAVENOUS; SUBCUTANEOUS ONCE
Status: DISCONTINUED | OUTPATIENT
Start: 2020-10-27 | End: 2020-10-27 | Stop reason: HOSPADM

## 2020-10-27 RX ORDER — FENTANYL CITRATE 50 UG/ML
25 INJECTION, SOLUTION INTRAMUSCULAR; INTRAVENOUS
Status: DISCONTINUED | OUTPATIENT
Start: 2020-10-27 | End: 2020-10-27 | Stop reason: HOSPADM

## 2020-10-27 RX ORDER — ONDANSETRON 4 MG/1
4 TABLET, FILM COATED ORAL EVERY 6 HOURS PRN
Status: DISCONTINUED | OUTPATIENT
Start: 2020-10-27 | End: 2020-10-30 | Stop reason: HOSPADM

## 2020-10-27 RX ORDER — SODIUM CHLORIDE 0.9 % (FLUSH) 0.9 %
3 SYRINGE (ML) INJECTION AS NEEDED
Status: DISCONTINUED | OUTPATIENT
Start: 2020-10-27 | End: 2020-10-27 | Stop reason: HOSPADM

## 2020-10-27 RX ORDER — SODIUM CHLORIDE, SODIUM LACTATE, POTASSIUM CHLORIDE, CALCIUM CHLORIDE 600; 310; 30; 20 MG/100ML; MG/100ML; MG/100ML; MG/100ML
1000 INJECTION, SOLUTION INTRAVENOUS CONTINUOUS
Status: DISCONTINUED | OUTPATIENT
Start: 2020-10-27 | End: 2020-10-27 | Stop reason: HOSPADM

## 2020-10-27 RX ORDER — PROPOFOL 10 MG/ML
VIAL (ML) INTRAVENOUS AS NEEDED
Status: DISCONTINUED | OUTPATIENT
Start: 2020-10-27 | End: 2020-10-27 | Stop reason: SURG

## 2020-10-27 RX ORDER — MORPHINE SULFATE 2 MG/ML
2 INJECTION, SOLUTION INTRAMUSCULAR; INTRAVENOUS
Status: DISCONTINUED | OUTPATIENT
Start: 2020-10-27 | End: 2020-10-27 | Stop reason: HOSPADM

## 2020-10-27 RX ORDER — BISACODYL 10 MG
10 SUPPOSITORY, RECTAL RECTAL DAILY PRN
Status: DISCONTINUED | OUTPATIENT
Start: 2020-10-27 | End: 2020-10-30 | Stop reason: HOSPADM

## 2020-10-27 RX ORDER — LIDOCAINE HYDROCHLORIDE 10 MG/ML
0.5 INJECTION, SOLUTION EPIDURAL; INFILTRATION; INTRACAUDAL; PERINEURAL ONCE AS NEEDED
Status: DISCONTINUED | OUTPATIENT
Start: 2020-10-27 | End: 2020-10-27 | Stop reason: HOSPADM

## 2020-10-27 RX ORDER — COCAINE HYDROCHLORIDE 40 MG/ML
SOLUTION NASAL AS NEEDED
Status: DISCONTINUED | OUTPATIENT
Start: 2020-10-27 | End: 2020-10-27 | Stop reason: HOSPADM

## 2020-10-27 RX ORDER — ALBUTEROL SULFATE 1.25 MG/3ML
1.25 SOLUTION RESPIRATORY (INHALATION)
Status: DISCONTINUED | OUTPATIENT
Start: 2020-10-27 | End: 2020-10-30 | Stop reason: HOSPADM

## 2020-10-27 RX ORDER — ALBUTEROL SULFATE 1.25 MG/3ML
1.25 SOLUTION RESPIRATORY (INHALATION) ONCE
Status: DISCONTINUED | OUTPATIENT
Start: 2020-10-27 | End: 2020-10-27 | Stop reason: HOSPADM

## 2020-10-27 RX ORDER — KETAMINE HYDROCHLORIDE 50 MG/ML
INJECTION, SOLUTION, CONCENTRATE INTRAMUSCULAR; INTRAVENOUS AS NEEDED
Status: DISCONTINUED | OUTPATIENT
Start: 2020-10-27 | End: 2020-10-27 | Stop reason: SURG

## 2020-10-27 RX ORDER — FLUMAZENIL 0.1 MG/ML
0.2 INJECTION INTRAVENOUS AS NEEDED
Status: DISCONTINUED | OUTPATIENT
Start: 2020-10-27 | End: 2020-10-27 | Stop reason: HOSPADM

## 2020-10-27 RX ORDER — DOCUSATE SODIUM 100 MG/1
100 CAPSULE, LIQUID FILLED ORAL DAILY
Status: DISCONTINUED | OUTPATIENT
Start: 2020-10-28 | End: 2020-10-30 | Stop reason: HOSPADM

## 2020-10-27 RX ORDER — OXYCODONE AND ACETAMINOPHEN 10; 325 MG/1; MG/1
1 TABLET ORAL ONCE AS NEEDED
Status: DISCONTINUED | OUTPATIENT
Start: 2020-10-27 | End: 2020-10-27 | Stop reason: HOSPADM

## 2020-10-27 RX ORDER — COCAINE HYDROCHLORIDE 40 MG/ML
SOLUTION NASAL
Status: DISPENSED
Start: 2020-10-27 | End: 2020-10-28

## 2020-10-27 RX ORDER — ACETAMINOPHEN 500 MG
1000 TABLET ORAL ONCE
Status: COMPLETED | OUTPATIENT
Start: 2020-10-27 | End: 2020-10-27

## 2020-10-27 RX ORDER — SODIUM CHLORIDE 0.9 % (FLUSH) 0.9 %
10 SYRINGE (ML) INJECTION AS NEEDED
Status: DISCONTINUED | OUTPATIENT
Start: 2020-10-27 | End: 2020-10-27 | Stop reason: HOSPADM

## 2020-10-27 RX ORDER — FENTANYL CITRATE 50 UG/ML
INJECTION, SOLUTION INTRAMUSCULAR; INTRAVENOUS AS NEEDED
Status: DISCONTINUED | OUTPATIENT
Start: 2020-10-27 | End: 2020-10-27 | Stop reason: SURG

## 2020-10-27 RX ORDER — DEXAMETHASONE SODIUM PHOSPHATE 4 MG/ML
INJECTION, SOLUTION INTRA-ARTICULAR; INTRALESIONAL; INTRAMUSCULAR; INTRAVENOUS; SOFT TISSUE AS NEEDED
Status: DISCONTINUED | OUTPATIENT
Start: 2020-10-27 | End: 2020-10-27 | Stop reason: SURG

## 2020-10-27 RX ORDER — NALOXONE HCL 0.4 MG/ML
0.04 VIAL (ML) INJECTION AS NEEDED
Status: DISCONTINUED | OUTPATIENT
Start: 2020-10-27 | End: 2020-10-27 | Stop reason: HOSPADM

## 2020-10-27 RX ORDER — ASPIRIN 81 MG/1
81 TABLET ORAL DAILY
Status: DISCONTINUED | OUTPATIENT
Start: 2020-10-28 | End: 2020-10-30 | Stop reason: HOSPADM

## 2020-10-27 RX ORDER — POLYETHYLENE GLYCOL 3350 17 G/17G
17 POWDER, FOR SOLUTION ORAL DAILY
Status: DISCONTINUED | OUTPATIENT
Start: 2020-10-28 | End: 2020-10-30 | Stop reason: HOSPADM

## 2020-10-27 RX ORDER — OXYCODONE HYDROCHLORIDE AND ACETAMINOPHEN 5; 325 MG/1; MG/1
1 TABLET ORAL
Status: DISCONTINUED | OUTPATIENT
Start: 2020-10-27 | End: 2020-10-30 | Stop reason: HOSPADM

## 2020-10-27 RX ORDER — ONDANSETRON 2 MG/ML
4 INJECTION INTRAMUSCULAR; INTRAVENOUS EVERY 6 HOURS PRN
Status: DISCONTINUED | OUTPATIENT
Start: 2020-10-27 | End: 2020-10-30 | Stop reason: HOSPADM

## 2020-10-27 RX ORDER — PHENYLEPHRINE HCL IN 0.9% NACL 0.8MG/10ML
SYRINGE (ML) INTRAVENOUS AS NEEDED
Status: DISCONTINUED | OUTPATIENT
Start: 2020-10-27 | End: 2020-10-27 | Stop reason: SURG

## 2020-10-27 RX ORDER — SODIUM CHLORIDE 0.9 % (FLUSH) 0.9 %
3 SYRINGE (ML) INJECTION EVERY 12 HOURS SCHEDULED
Status: DISCONTINUED | OUTPATIENT
Start: 2020-10-27 | End: 2020-10-27 | Stop reason: HOSPADM

## 2020-10-27 RX ORDER — SODIUM CHLORIDE 9 MG/ML
50 INJECTION, SOLUTION INTRAVENOUS CONTINUOUS
Status: DISCONTINUED | OUTPATIENT
Start: 2020-10-27 | End: 2020-10-28

## 2020-10-27 RX ORDER — IPRATROPIUM BROMIDE AND ALBUTEROL SULFATE 2.5; .5 MG/3ML; MG/3ML
3 SOLUTION RESPIRATORY (INHALATION) EVERY 6 HOURS PRN
Status: ACTIVE | OUTPATIENT
Start: 2020-10-27 | End: 2020-10-29

## 2020-10-27 RX ORDER — OXYCODONE AND ACETAMINOPHEN 7.5; 325 MG/1; MG/1
2 TABLET ORAL ONCE AS NEEDED
Status: DISCONTINUED | OUTPATIENT
Start: 2020-10-27 | End: 2020-10-27 | Stop reason: HOSPADM

## 2020-10-27 RX ORDER — ROSUVASTATIN CALCIUM 10 MG/1
10 TABLET, COATED ORAL NIGHTLY
Status: DISCONTINUED | OUTPATIENT
Start: 2020-10-27 | End: 2020-10-30 | Stop reason: HOSPADM

## 2020-10-27 RX ORDER — ACETYLCYSTEINE 200 MG/ML
3 SOLUTION ORAL; RESPIRATORY (INHALATION)
Status: DISPENSED | OUTPATIENT
Start: 2020-10-27 | End: 2020-10-29

## 2020-10-27 RX ORDER — EPHEDRINE SULFATE 50 MG/ML
INJECTION, SOLUTION INTRAVENOUS AS NEEDED
Status: DISCONTINUED | OUTPATIENT
Start: 2020-10-27 | End: 2020-10-27 | Stop reason: SURG

## 2020-10-27 RX ORDER — OXYMETAZOLINE HYDROCHLORIDE 0.05 G/100ML
2 SPRAY NASAL
Status: DISCONTINUED | OUTPATIENT
Start: 2020-10-27 | End: 2020-10-27 | Stop reason: HOSPADM

## 2020-10-27 RX ORDER — LABETALOL HYDROCHLORIDE 5 MG/ML
5 INJECTION, SOLUTION INTRAVENOUS
Status: DISCONTINUED | OUTPATIENT
Start: 2020-10-27 | End: 2020-10-27 | Stop reason: HOSPADM

## 2020-10-27 RX ORDER — BUPIVACAINE HCL/0.9 % NACL/PF 0.1 %
2 PLASTIC BAG, INJECTION (ML) EPIDURAL EVERY 8 HOURS
Status: COMPLETED | OUTPATIENT
Start: 2020-10-28 | End: 2020-10-28

## 2020-10-27 RX ADMIN — KETAMINE HYDROCHLORIDE 100 MG: 50 INJECTION, SOLUTION INTRAMUSCULAR; INTRAVENOUS at 13:46

## 2020-10-27 RX ADMIN — EPHEDRINE SULFATE 25 MG: 50 INJECTION INTRAVENOUS at 13:41

## 2020-10-27 RX ADMIN — FENTANYL CITRATE 100 MCG: 50 INJECTION INTRAMUSCULAR; INTRAVENOUS at 13:28

## 2020-10-27 RX ADMIN — ONDANSETRON HYDROCHLORIDE 8 MG: 2 SOLUTION INTRAMUSCULAR; INTRAVENOUS at 17:17

## 2020-10-27 RX ADMIN — SUGAMMADEX 200 MG: 100 INJECTION, SOLUTION INTRAVENOUS at 18:28

## 2020-10-27 RX ADMIN — METOPROLOL TARTRATE 2.5 MG: 5 INJECTION INTRAVENOUS at 18:24

## 2020-10-27 RX ADMIN — OXYCODONE HYDROCHLORIDE AND ACETAMINOPHEN 1 TABLET: 5; 325 TABLET ORAL at 23:10

## 2020-10-27 RX ADMIN — FENTANYL CITRATE 25 MCG: 50 INJECTION INTRAMUSCULAR; INTRAVENOUS at 19:18

## 2020-10-27 RX ADMIN — MIDAZOLAM HYDROCHLORIDE 1 MG: 2 INJECTION, SOLUTION INTRAMUSCULAR; INTRAVENOUS at 13:01

## 2020-10-27 RX ADMIN — Medication 2 G: at 17:30

## 2020-10-27 RX ADMIN — Medication 160 MCG: at 14:30

## 2020-10-27 RX ADMIN — DEXAMETHASONE SODIUM PHOSPHATE 8 MG: 4 INJECTION, SOLUTION INTRAMUSCULAR; INTRAVENOUS at 14:31

## 2020-10-27 RX ADMIN — FENTANYL CITRATE 100 MCG: 50 INJECTION INTRAMUSCULAR; INTRAVENOUS at 14:00

## 2020-10-27 RX ADMIN — Medication 2 G: at 13:30

## 2020-10-27 RX ADMIN — Medication 160 MCG: at 14:32

## 2020-10-27 RX ADMIN — FENTANYL CITRATE 50 MCG: 50 INJECTION INTRAMUSCULAR; INTRAVENOUS at 14:30

## 2020-10-27 RX ADMIN — Medication 160 MCG: at 15:12

## 2020-10-27 RX ADMIN — SODIUM CHLORIDE, POTASSIUM CHLORIDE, SODIUM LACTATE AND CALCIUM CHLORIDE 1000 ML: 600; 310; 30; 20 INJECTION, SOLUTION INTRAVENOUS at 07:20

## 2020-10-27 RX ADMIN — ROSUVASTATIN CALCIUM 10 MG: 10 TABLET, FILM COATED ORAL at 22:52

## 2020-10-27 RX ADMIN — PROPOFOL 100 MG: 10 INJECTION, EMULSION INTRAVENOUS at 18:22

## 2020-10-27 RX ADMIN — LIDOCAINE HYDROCHLORIDE 100 MG: 20 INJECTION, SOLUTION INTRAVENOUS at 13:28

## 2020-10-27 RX ADMIN — SODIUM CHLORIDE, POTASSIUM CHLORIDE, SODIUM LACTATE AND CALCIUM CHLORIDE 1000 ML: 600; 310; 30; 20 INJECTION, SOLUTION INTRAVENOUS at 07:23

## 2020-10-27 RX ADMIN — FENTANYL CITRATE 25 MCG: 50 INJECTION INTRAMUSCULAR; INTRAVENOUS at 19:33

## 2020-10-27 RX ADMIN — Medication 160 MCG: at 14:59

## 2020-10-27 RX ADMIN — MIDAZOLAM HYDROCHLORIDE 1 MG: 2 INJECTION, SOLUTION INTRAMUSCULAR; INTRAVENOUS at 13:13

## 2020-10-27 RX ADMIN — ACETAMINOPHEN 1000 MG: 500 TABLET, FILM COATED ORAL at 13:02

## 2020-10-27 RX ADMIN — ROCURONIUM BROMIDE 30 MG: 10 INJECTION INTRAVENOUS at 17:18

## 2020-10-27 RX ADMIN — Medication 2 SPRAY: at 13:02

## 2020-10-27 RX ADMIN — Medication 80 MCG: at 14:55

## 2020-10-27 RX ADMIN — Medication 160 MCG: at 14:34

## 2020-10-27 RX ADMIN — ROCURONIUM BROMIDE 50 MG: 10 INJECTION INTRAVENOUS at 14:24

## 2020-10-27 RX ADMIN — ROCURONIUM BROMIDE 50 MG: 10 INJECTION INTRAVENOUS at 15:30

## 2020-10-27 RX ADMIN — PHENYLEPHRINE HYDROCHLORIDE 0.1 MCG: 10 INJECTION INTRAVENOUS at 15:18

## 2020-10-27 RX ADMIN — FENTANYL CITRATE 25 MCG: 50 INJECTION INTRAMUSCULAR; INTRAVENOUS at 19:23

## 2020-10-27 RX ADMIN — PROPOFOL 150 MG: 10 INJECTION, EMULSION INTRAVENOUS at 13:28

## 2020-10-27 RX ADMIN — FENTANYL CITRATE 25 MCG: 50 INJECTION INTRAMUSCULAR; INTRAVENOUS at 19:28

## 2020-10-27 RX ADMIN — SODIUM CHLORIDE 50 ML/HR: 9 INJECTION, SOLUTION INTRAVENOUS at 22:52

## 2020-10-27 RX ADMIN — GLYCOPYRROLATE 0.2 MG: 0.2 INJECTION, SOLUTION INTRAMUSCULAR; INTRAVENOUS at 13:41

## 2020-10-27 RX ADMIN — Medication 160 MCG: at 15:10

## 2020-10-27 RX ADMIN — VERAPAMIL HYDROCHLORIDE 240 MG: 240 TABLET, FILM COATED, EXTENDED RELEASE ORAL at 22:52

## 2020-10-27 RX ADMIN — Medication 160 MCG: at 15:07

## 2020-10-27 RX ADMIN — ROCURONIUM BROMIDE 50 MG: 10 INJECTION INTRAVENOUS at 13:28

## 2020-10-27 NOTE — ANESTHESIA PREPROCEDURE EVALUATION
Anesthesia Evaluation     Patient summary reviewed and Nursing notes reviewed   no history of anesthetic complications:  NPO Solid Status: > 8 hours  NPO Liquid Status: > 8 hours           Airway   Mallampati: II  No difficulty expected  Dental - normal exam     Pulmonary    (+) a smoker Current,     ROS comment: Pulmonary nodules  Cardiovascular   Exercise tolerance: good (4-7 METS)    ECG reviewed    (+) hyperlipidemia,   (-) past MI, CAD, dysrhythmias, angina      Neuro/Psych  (+) headaches (cluster headaches, tx with occassional prednisone and triptans),     GI/Hepatic/Renal/Endo - negative ROS   (-) liver disease, no renal disease, diabetes, no thyroid disorder    ROS Comment: Pt had PET scan with lesion in nasopharynx, Dr Brown to biopsy    Musculoskeletal (-) negative ROS    Abdominal    Substance History - negative use     OB/GYN negative ob/gyn ROS         Other                      Anesthesia Plan    ASA 3     general     intravenous induction     Anesthetic plan, all risks, benefits, and alternatives have been provided, discussed and informed consent has been obtained with: patient.

## 2020-10-27 NOTE — ANESTHESIA PROCEDURE NOTES
Airway  Urgency: elective    Date/Time: 10/27/2020 1:30 PM  Airway not difficult    General Information and Staff    Patient location during procedure: OR  CRNA: GAVIN Hall CRNA    Indications and Patient Condition  Indications for airway management: airway protection    Preoxygenated: yes  MILS maintained throughout  Mask difficulty assessment: 0 - not attempted    Final Airway Details  Final airway type: endotracheal airway      Successful airway: ETT  Cuffed: yes   Successful intubation technique: direct laryngoscopy  Facilitating devices/methods: intubating stylet  Endotracheal tube insertion site: oral  Blade: Pacheco  Blade size: 3  ETT size (mm): 8.0  Cormack-Lehane Classification: grade I - full view of glottis  Placement verified by: chest auscultation and capnometry   Cuff volume (mL): 6  Measured from: lips  ETT/EBT  to lips (cm): 22  Number of attempts at approach: 1  Assessment: lips, teeth, and gum same as pre-op and atraumatic intubation

## 2020-10-28 ENCOUNTER — APPOINTMENT (OUTPATIENT)
Dept: GENERAL RADIOLOGY | Facility: HOSPITAL | Age: 60
End: 2020-10-28

## 2020-10-28 LAB
ANION GAP SERPL CALCULATED.3IONS-SCNC: 9 MMOL/L (ref 5–15)
BUN SERPL-MCNC: 16 MG/DL (ref 6–20)
BUN/CREAT SERPL: 13.7 (ref 7–25)
CALCIUM SPEC-SCNC: 8.5 MG/DL (ref 8.6–10.5)
CHLORIDE SERPL-SCNC: 106 MMOL/L (ref 98–107)
CO2 SERPL-SCNC: 25 MMOL/L (ref 22–29)
CREAT SERPL-MCNC: 1.17 MG/DL (ref 0.76–1.27)
DEPRECATED RDW RBC AUTO: 48 FL (ref 37–54)
ERYTHROCYTE [DISTWIDTH] IN BLOOD BY AUTOMATED COUNT: 13.2 % (ref 12.3–15.4)
GFR SERPL CREATININE-BSD FRML MDRD: 64 ML/MIN/1.73
GLUCOSE SERPL-MCNC: 143 MG/DL (ref 65–99)
HCT VFR BLD AUTO: 42.3 % (ref 37.5–51)
HGB BLD-MCNC: 13.7 G/DL (ref 13–17.7)
MCH RBC QN AUTO: 31.9 PG (ref 26.6–33)
MCHC RBC AUTO-ENTMCNC: 32.4 G/DL (ref 31.5–35.7)
MCV RBC AUTO: 98.6 FL (ref 79–97)
PLATELET # BLD AUTO: 201 10*3/MM3 (ref 140–450)
PMV BLD AUTO: 9.5 FL (ref 6–12)
POTASSIUM SERPL-SCNC: 4.5 MMOL/L (ref 3.5–5.2)
RBC # BLD AUTO: 4.29 10*6/MM3 (ref 4.14–5.8)
SODIUM SERPL-SCNC: 140 MMOL/L (ref 136–145)
WBC # BLD AUTO: 20.71 10*3/MM3 (ref 3.4–10.8)

## 2020-10-28 PROCEDURE — 71045 X-RAY EXAM CHEST 1 VIEW: CPT

## 2020-10-28 PROCEDURE — 94799 UNLISTED PULMONARY SVC/PX: CPT

## 2020-10-28 PROCEDURE — 25010000002 ENOXAPARIN PER 10 MG: Performed by: SURGERY

## 2020-10-28 PROCEDURE — 99024 POSTOP FOLLOW-UP VISIT: CPT | Performed by: NURSE PRACTITIONER

## 2020-10-28 PROCEDURE — 85027 COMPLETE CBC AUTOMATED: CPT | Performed by: SURGERY

## 2020-10-28 PROCEDURE — 99024 POSTOP FOLLOW-UP VISIT: CPT | Performed by: OTOLARYNGOLOGY

## 2020-10-28 PROCEDURE — 25010000002 KETOROLAC TROMETHAMINE PER 15 MG: Performed by: NURSE PRACTITIONER

## 2020-10-28 PROCEDURE — 80048 BASIC METABOLIC PNL TOTAL CA: CPT | Performed by: SURGERY

## 2020-10-28 PROCEDURE — 25010000002 CEFAZOLIN PER 500 MG: Performed by: SURGERY

## 2020-10-28 RX ORDER — KETOROLAC TROMETHAMINE 15 MG/ML
15 INJECTION, SOLUTION INTRAMUSCULAR; INTRAVENOUS EVERY 6 HOURS
Status: DISCONTINUED | OUTPATIENT
Start: 2020-10-28 | End: 2020-10-30 | Stop reason: HOSPADM

## 2020-10-28 RX ADMIN — ROSUVASTATIN CALCIUM 10 MG: 10 TABLET, FILM COATED ORAL at 21:35

## 2020-10-28 RX ADMIN — CEFAZOLIN SODIUM 2 G: 10 INJECTION, POWDER, FOR SOLUTION INTRAVENOUS at 00:22

## 2020-10-28 RX ADMIN — IPRATROPIUM BROMIDE AND ALBUTEROL SULFATE 3 ML: 2.5; .5 SOLUTION RESPIRATORY (INHALATION) at 06:47

## 2020-10-28 RX ADMIN — ALBUTEROL SULFATE 1.25 MG: 1.25 SOLUTION RESPIRATORY (INHALATION) at 14:48

## 2020-10-28 RX ADMIN — ENOXAPARIN SODIUM 40 MG: 40 INJECTION SUBCUTANEOUS at 08:38

## 2020-10-28 RX ADMIN — KETOROLAC TROMETHAMINE 15 MG: 15 INJECTION, SOLUTION INTRAMUSCULAR; INTRAVENOUS at 17:39

## 2020-10-28 RX ADMIN — ALBUTEROL SULFATE 1.25 MG: 1.25 SOLUTION RESPIRATORY (INHALATION) at 21:17

## 2020-10-28 RX ADMIN — ASPIRIN 81 MG: 81 TABLET ORAL at 08:39

## 2020-10-28 RX ADMIN — KETOROLAC TROMETHAMINE 15 MG: 15 INJECTION, SOLUTION INTRAMUSCULAR; INTRAVENOUS at 09:35

## 2020-10-28 RX ADMIN — OXYCODONE HYDROCHLORIDE AND ACETAMINOPHEN 1 TABLET: 5; 325 TABLET ORAL at 21:56

## 2020-10-28 RX ADMIN — ALBUTEROL SULFATE 1.25 MG: 1.25 SOLUTION RESPIRATORY (INHALATION) at 10:42

## 2020-10-28 RX ADMIN — VERAPAMIL HYDROCHLORIDE 240 MG: 240 TABLET, FILM COATED, EXTENDED RELEASE ORAL at 21:35

## 2020-10-28 RX ADMIN — ACETYLCYSTEINE 3 ML: 200 SOLUTION ORAL; RESPIRATORY (INHALATION) at 21:18

## 2020-10-28 RX ADMIN — CEFAZOLIN SODIUM 2 G: 10 INJECTION, POWDER, FOR SOLUTION INTRAVENOUS at 10:13

## 2020-10-28 RX ADMIN — ACETYLCYSTEINE 1.5 ML: 200 SOLUTION ORAL; RESPIRATORY (INHALATION) at 14:49

## 2020-10-28 RX ADMIN — KETOROLAC TROMETHAMINE 15 MG: 15 INJECTION, SOLUTION INTRAMUSCULAR; INTRAVENOUS at 21:36

## 2020-10-28 RX ADMIN — OXYCODONE HYDROCHLORIDE AND ACETAMINOPHEN 1 TABLET: 5; 325 TABLET ORAL at 09:35

## 2020-10-28 RX ADMIN — ACETYLCYSTEINE 1.5 ML: 200 SOLUTION ORAL; RESPIRATORY (INHALATION) at 06:47

## 2020-10-28 NOTE — ANESTHESIA POSTPROCEDURE EVALUATION
Patient: Carlos Anaya    Procedure Summary     Date: 10/27/20 Room / Location: [facility] OR  /  [facility] OR    Anesthesia Start: 1324 Anesthesia Stop: 1846    Procedures:       LEFT THORACOSCOPY WITH DAVINCI ROBOT WITH WEDGE RESECTION, LOBECTOMY, MEDIASTINAL LYMPH NODE DISSECTION (Left Chest)      BRONCHOSCOPY (N/A Bronchus)      Direct laryngoscopy, rigid nasal endoscopy with biopsy (N/A )      rigid nasal endoscopy with biopsy (N/A Nose) Diagnosis:       Solitary pulmonary nodule      (Solitary pulmonary nodule [R91.1])    Surgeon: Torrey Morrow MD; Rick Brown MD Provider: GAVIN Hall CRNA    Anesthesia Type: general ASA Status: 3          Anesthesia Type: general    Vitals  Vitals Value Taken Time   BP 88/54 10/27/20 1904   Temp     Pulse 74 10/27/20 1907   Resp     SpO2 98 % 10/27/20 1907   Vitals shown include unvalidated device data.        Post Anesthesia Care and Evaluation    Patient location during evaluation: PACU  Patient participation: complete - patient participated  Level of consciousness: awake and alert  Pain score: 0  Pain management: adequate  Airway patency: patent  Anesthetic complications: No anesthetic complications    Cardiovascular status: acceptable and stable  Respiratory status: acceptable and unassisted  Hydration status: acceptable

## 2020-10-29 ENCOUNTER — APPOINTMENT (OUTPATIENT)
Dept: GENERAL RADIOLOGY | Facility: HOSPITAL | Age: 60
End: 2020-10-29

## 2020-10-29 LAB
ANION GAP SERPL CALCULATED.3IONS-SCNC: 10 MMOL/L (ref 5–15)
BUN SERPL-MCNC: 18 MG/DL (ref 6–20)
BUN/CREAT SERPL: 17.3 (ref 7–25)
CALCIUM SPEC-SCNC: 8.6 MG/DL (ref 8.6–10.5)
CHLORIDE SERPL-SCNC: 106 MMOL/L (ref 98–107)
CO2 SERPL-SCNC: 24 MMOL/L (ref 22–29)
CREAT SERPL-MCNC: 1.04 MG/DL (ref 0.76–1.27)
DEPRECATED RDW RBC AUTO: 46.9 FL (ref 37–54)
ERYTHROCYTE [DISTWIDTH] IN BLOOD BY AUTOMATED COUNT: 13.2 % (ref 12.3–15.4)
GFR SERPL CREATININE-BSD FRML MDRD: 73 ML/MIN/1.73
GLUCOSE SERPL-MCNC: 119 MG/DL (ref 65–99)
HCT VFR BLD AUTO: 39.6 % (ref 37.5–51)
HGB BLD-MCNC: 13.3 G/DL (ref 13–17.7)
LAB AP CASE REPORT: NORMAL
LAB AP SPECIAL STAINS: NORMAL
LAB AP SYNOPTIC CHECKLIST: NORMAL
Lab: NORMAL
MCH RBC QN AUTO: 32.4 PG (ref 26.6–33)
MCHC RBC AUTO-ENTMCNC: 33.6 G/DL (ref 31.5–35.7)
MCV RBC AUTO: 96.4 FL (ref 79–97)
PATH REPORT.FINAL DX SPEC: NORMAL
PATH REPORT.GROSS SPEC: NORMAL
PLATELET # BLD AUTO: 192 10*3/MM3 (ref 140–450)
PMV BLD AUTO: 9.8 FL (ref 6–12)
POTASSIUM SERPL-SCNC: 4 MMOL/L (ref 3.5–5.2)
RBC # BLD AUTO: 4.11 10*6/MM3 (ref 4.14–5.8)
SODIUM SERPL-SCNC: 140 MMOL/L (ref 136–145)
WBC # BLD AUTO: 20.16 10*3/MM3 (ref 3.4–10.8)

## 2020-10-29 PROCEDURE — 94799 UNLISTED PULMONARY SVC/PX: CPT

## 2020-10-29 PROCEDURE — 80048 BASIC METABOLIC PNL TOTAL CA: CPT | Performed by: NURSE PRACTITIONER

## 2020-10-29 PROCEDURE — 25010000002 KETOROLAC TROMETHAMINE PER 15 MG: Performed by: NURSE PRACTITIONER

## 2020-10-29 PROCEDURE — 85027 COMPLETE CBC AUTOMATED: CPT | Performed by: NURSE PRACTITIONER

## 2020-10-29 PROCEDURE — 99024 POSTOP FOLLOW-UP VISIT: CPT | Performed by: NURSE PRACTITIONER

## 2020-10-29 PROCEDURE — 25010000002 ENOXAPARIN PER 10 MG: Performed by: SURGERY

## 2020-10-29 PROCEDURE — 71045 X-RAY EXAM CHEST 1 VIEW: CPT

## 2020-10-29 RX ORDER — METHOCARBAMOL 500 MG/1
500 TABLET, FILM COATED ORAL EVERY 8 HOURS SCHEDULED
Status: DISCONTINUED | OUTPATIENT
Start: 2020-10-29 | End: 2020-10-30 | Stop reason: HOSPADM

## 2020-10-29 RX ADMIN — KETOROLAC TROMETHAMINE 15 MG: 15 INJECTION, SOLUTION INTRAMUSCULAR; INTRAVENOUS at 09:15

## 2020-10-29 RX ADMIN — ACETYLCYSTEINE 1.5 ML: 200 SOLUTION ORAL; RESPIRATORY (INHALATION) at 06:31

## 2020-10-29 RX ADMIN — ALBUTEROL SULFATE 1.25 MG: 1.25 SOLUTION RESPIRATORY (INHALATION) at 06:31

## 2020-10-29 RX ADMIN — METHOCARBAMOL 500 MG: 500 TABLET ORAL at 22:27

## 2020-10-29 RX ADMIN — KETOROLAC TROMETHAMINE 15 MG: 15 INJECTION, SOLUTION INTRAMUSCULAR; INTRAVENOUS at 18:03

## 2020-10-29 RX ADMIN — VERAPAMIL HYDROCHLORIDE 240 MG: 240 TABLET, FILM COATED, EXTENDED RELEASE ORAL at 09:14

## 2020-10-29 RX ADMIN — KETOROLAC TROMETHAMINE 15 MG: 15 INJECTION, SOLUTION INTRAMUSCULAR; INTRAVENOUS at 22:27

## 2020-10-29 RX ADMIN — ROSUVASTATIN CALCIUM 10 MG: 10 TABLET, FILM COATED ORAL at 20:37

## 2020-10-29 RX ADMIN — METHOCARBAMOL 500 MG: 500 TABLET ORAL at 14:55

## 2020-10-29 RX ADMIN — KETOROLAC TROMETHAMINE 15 MG: 15 INJECTION, SOLUTION INTRAMUSCULAR; INTRAVENOUS at 05:00

## 2020-10-29 RX ADMIN — ASPIRIN 81 MG: 81 TABLET ORAL at 09:13

## 2020-10-29 RX ADMIN — ALBUTEROL SULFATE 1.25 MG: 1.25 SOLUTION RESPIRATORY (INHALATION) at 21:02

## 2020-10-29 RX ADMIN — ALBUTEROL SULFATE 1.25 MG: 1.25 SOLUTION RESPIRATORY (INHALATION) at 10:35

## 2020-10-29 RX ADMIN — ALBUTEROL SULFATE 1.25 MG: 1.25 SOLUTION RESPIRATORY (INHALATION) at 13:58

## 2020-10-29 RX ADMIN — ENOXAPARIN SODIUM 40 MG: 40 INJECTION SUBCUTANEOUS at 09:14

## 2020-10-29 RX ADMIN — VERAPAMIL HYDROCHLORIDE 240 MG: 240 TABLET, FILM COATED, EXTENDED RELEASE ORAL at 20:37

## 2020-10-30 ENCOUNTER — APPOINTMENT (OUTPATIENT)
Dept: GENERAL RADIOLOGY | Facility: HOSPITAL | Age: 60
End: 2020-10-30

## 2020-10-30 VITALS
HEIGHT: 73 IN | OXYGEN SATURATION: 99 % | BODY MASS INDEX: 25.39 KG/M2 | DIASTOLIC BLOOD PRESSURE: 46 MMHG | HEART RATE: 64 BPM | WEIGHT: 191.58 LBS | SYSTOLIC BLOOD PRESSURE: 105 MMHG | TEMPERATURE: 97.5 F | RESPIRATION RATE: 16 BRPM

## 2020-10-30 PROBLEM — C34.92 ADENOCARCINOMA OF LEFT LUNG (HCC): Status: ACTIVE | Noted: 2020-10-30

## 2020-10-30 LAB
ANION GAP SERPL CALCULATED.3IONS-SCNC: 6 MMOL/L (ref 5–15)
BUN SERPL-MCNC: 16 MG/DL (ref 6–20)
BUN/CREAT SERPL: 17.8 (ref 7–25)
CALCIUM SPEC-SCNC: 8.6 MG/DL (ref 8.6–10.5)
CHLORIDE SERPL-SCNC: 109 MMOL/L (ref 98–107)
CO2 SERPL-SCNC: 24 MMOL/L (ref 22–29)
CREAT SERPL-MCNC: 0.9 MG/DL (ref 0.76–1.27)
DEPRECATED RDW RBC AUTO: 47.8 FL (ref 37–54)
ERYTHROCYTE [DISTWIDTH] IN BLOOD BY AUTOMATED COUNT: 13.2 % (ref 12.3–15.4)
GFR SERPL CREATININE-BSD FRML MDRD: 86 ML/MIN/1.73
GLUCOSE SERPL-MCNC: 97 MG/DL (ref 65–99)
HCT VFR BLD AUTO: 37.8 % (ref 37.5–51)
HGB BLD-MCNC: 12.7 G/DL (ref 13–17.7)
MCH RBC QN AUTO: 32.8 PG (ref 26.6–33)
MCHC RBC AUTO-ENTMCNC: 33.6 G/DL (ref 31.5–35.7)
MCV RBC AUTO: 97.7 FL (ref 79–97)
PLATELET # BLD AUTO: 182 10*3/MM3 (ref 140–450)
PMV BLD AUTO: 9.5 FL (ref 6–12)
POTASSIUM SERPL-SCNC: 4.2 MMOL/L (ref 3.5–5.2)
RBC # BLD AUTO: 3.87 10*6/MM3 (ref 4.14–5.8)
SODIUM SERPL-SCNC: 139 MMOL/L (ref 136–145)
WBC # BLD AUTO: 14.19 10*3/MM3 (ref 3.4–10.8)

## 2020-10-30 PROCEDURE — 25010000002 ENOXAPARIN PER 10 MG: Performed by: SURGERY

## 2020-10-30 PROCEDURE — 85027 COMPLETE CBC AUTOMATED: CPT | Performed by: NURSE PRACTITIONER

## 2020-10-30 PROCEDURE — 71045 X-RAY EXAM CHEST 1 VIEW: CPT

## 2020-10-30 PROCEDURE — 99024 POSTOP FOLLOW-UP VISIT: CPT | Performed by: NURSE PRACTITIONER

## 2020-10-30 PROCEDURE — 80048 BASIC METABOLIC PNL TOTAL CA: CPT | Performed by: NURSE PRACTITIONER

## 2020-10-30 PROCEDURE — 94799 UNLISTED PULMONARY SVC/PX: CPT

## 2020-10-30 PROCEDURE — 25010000002 KETOROLAC TROMETHAMINE PER 15 MG: Performed by: NURSE PRACTITIONER

## 2020-10-30 RX ORDER — METHOCARBAMOL 500 MG/1
500 TABLET, FILM COATED ORAL EVERY 8 HOURS PRN
Qty: 30 TABLET | Refills: 0 | Status: SHIPPED | OUTPATIENT
Start: 2020-10-30

## 2020-10-30 RX ORDER — HYDROCODONE BITARTRATE AND ACETAMINOPHEN 5; 325 MG/1; MG/1
1 TABLET ORAL EVERY 6 HOURS PRN
Qty: 25 TABLET | Refills: 0 | Status: SHIPPED | OUTPATIENT
Start: 2020-10-30 | End: 2020-11-19

## 2020-10-30 RX ADMIN — ASPIRIN 81 MG: 81 TABLET ORAL at 09:05

## 2020-10-30 RX ADMIN — ALBUTEROL SULFATE 1.25 MG: 1.25 SOLUTION RESPIRATORY (INHALATION) at 06:25

## 2020-10-30 RX ADMIN — ENOXAPARIN SODIUM 40 MG: 40 INJECTION SUBCUTANEOUS at 09:05

## 2020-10-30 RX ADMIN — VERAPAMIL HYDROCHLORIDE 240 MG: 240 TABLET, FILM COATED, EXTENDED RELEASE ORAL at 09:05

## 2020-10-30 RX ADMIN — KETOROLAC TROMETHAMINE 15 MG: 15 INJECTION, SOLUTION INTRAMUSCULAR; INTRAVENOUS at 15:58

## 2020-10-30 RX ADMIN — METHOCARBAMOL 500 MG: 500 TABLET ORAL at 05:12

## 2020-10-30 RX ADMIN — KETOROLAC TROMETHAMINE 15 MG: 15 INJECTION, SOLUTION INTRAMUSCULAR; INTRAVENOUS at 03:31

## 2020-10-30 RX ADMIN — METHOCARBAMOL 500 MG: 500 TABLET ORAL at 13:38

## 2020-10-30 RX ADMIN — ALBUTEROL SULFATE 1.25 MG: 1.25 SOLUTION RESPIRATORY (INHALATION) at 15:13

## 2020-10-30 RX ADMIN — ALBUTEROL SULFATE 1.25 MG: 1.25 SOLUTION RESPIRATORY (INHALATION) at 10:50

## 2020-10-30 RX ADMIN — KETOROLAC TROMETHAMINE 15 MG: 15 INJECTION, SOLUTION INTRAMUSCULAR; INTRAVENOUS at 09:11

## 2020-10-31 ENCOUNTER — READMISSION MANAGEMENT (OUTPATIENT)
Dept: CALL CENTER | Facility: HOSPITAL | Age: 60
End: 2020-10-31

## 2020-10-31 NOTE — OUTREACH NOTE
Prep Survey      Responses   [facility] facility patient discharged from?  [place]   Is LACE score < 7 ?  No   Eligibility  Readm Mgmt   Discharge diagnosis  Solitary pulmonary nodule, adenocarcinoma of left lung [s/p LEFT THORACOSCOPY WITH DAVINCI ROBOT WITH WEDGE RESECTION, LOBECTOMY, MEDIASTINAL LYMPH NODE DISSECTION (Left)]   Does the patient have one of the following disease processes/diagnoses(primary or secondary)?  Cardiothoracic surgery   Does the patient have Home health ordered?  No   Is there a DME ordered?  No   Comments regarding appointments  Per AVS   Prep survey completed?  Yes          Felicia Mane RN

## 2020-11-02 NOTE — PAYOR COMM NOTE
"IA HOME 10-30-20  [id number]    Carlos Anaya (60 y.o. Male)     Date of Birth Social Security Number Address Home Phone MRN    1960  [address] [facility] [place] [address] [phone] [id number]    Amish Marital Status          Other        Admission Date Admission Type Admitting Provider Attending Provider Department, Room/Bed    10/27/20 Elective Morrow, Torrey REARDON MD  [facility] 3C, 383/1    Discharge Date Discharge Disposition Discharge Destination        10/30/2020 Home or Self Care              Attending Provider: (none)   Allergies: No Known Allergies    Isolation: None   Infection: None   Code Status: Prior    Ht: 185 cm (72.84\")   Wt: 86.9 kg (191 lb 9.3 oz)    Admission Cmt: None   Principal Problem: Solitary pulmonary nodule [R91.1]                 Active Insurance as of 10/27/2020     Primary Coverage     Payor Plan Insurance Group Employer/Plan Group    CIG[employer] CIGNA [id number]     Payor Plan Address Payor Plan Phone Number Payor Plan Fax Number Effective Dates    [address] [phone]  1/1/2018 - None Entered    [facility] [id number]       Subscriber Name Subscriber Birth Date Member ID       CARLOS ANAYA 1960 [id number]                 Emergency Contacts      (Rel.) Home Phone Work Phone Mobile Phone    DANIAL ANAYA (Spouse) [phone] -- --               Discharge Summary      Kia Voss APRN at 10/30/20 1607     Attestation signed by Torrey Morrow MD at 10/30/20 1642    I have personally seen and examined Carlos Anaya and reviewed the record. Agree with the aforementioned plan rendered jointly with Kia Voss.                                  [facility] Cardiothoracic Surgery  DISCHARGE SUMMARY        Date of Admission: 10/27/2020  Date of Discharge:  10/30/2020  Primary Care Physician: Swathi Vicente APRN    Discharge Diagnoses:  Active Hospital Problems    Diagnosis   • **Solitary pulmonary nodule   • " Adenocarcinoma of left lung (CMS/HCC)   • Nicotine dependence       Procedures Performed: Procedure(s) (LRB):  LEFT THORACOSCOPY WITH DAVINCI ROBOT WITH WEDGE RESECTION, LOBECTOMY, MEDIASTINAL LYMPH NODE DISSECTION (Left)  BRONCHOSCOPY (N/A)  Direct laryngoscopy, rigid nasal endoscopy with biopsy (N/A)  rigid nasal endoscopy with biopsy (N/A)    HPI:  Mr. Carlos Anaya is a 60 y.o. male who presented to the clinic with a left upper lobe lung nodule that had been enlarging and was therefore referred to CT surgery for further evaluation.  The nodule was originally found of screening CT scan.  PET scan revealed FDG avidity at the location of the nodule and no evidence of metastatic disease.  The patient is a current smoker and admits that he has smoked 1 ppd for 40 years.  He works in a [employer] plant but uses the appropriate protective gear.  He denies hemoptysis, fever, chills, night sweats, and unexpected weight loss.  He has no history of cancer.  He does admit an occasional productive cough.  He stated that his mother passed away at the age of 72 with lung cancer and she also was a smoker.      Hospital Course: On 10/27/2020, Mr. Anaya was taken to the operative suite for left thoracoscopy with the DaVinci Robot with wedge resection, lobectomy, mediastinal lymph node dissection, and bronchoscopy.  See separate op note by Dr. Morrow.  Direct laryngoscopy and rigid nasal endoscopy with biopsy was also performed by  Dr. Brown at this time and this procedure is detailed in his op note as well.  Following surgery, the patient was transferred to the PACU in stable condition.  After meeting PACU discharge criteria, he was transferred to  for ongoing recovery.  The remainder of his hospital stay was significant for encouraging pulmonary toilet and ambulation and pain control. Final pathology results positive for invasive adenocarcinoma.  His chest tube was clamped on post op day 3 and repeat CXR demonstrated  stability.  Chest tube was subsequently removed without remark and follow up CXR demonstrated ongoing stability without pneumothorax.  He meets criteria for discharge home on post op day 3 and is agreeable to go home with his wife.     Condition on Discharge:  Neurologically intact and has good pain control.  He is eating well and has demonstrable good bowel function.  All thoracic incisions are healing nicely without evidence of thoracic hernia.  The heart is in normal sinus rhythm.         Discharge Disposition:  Home    Discharge Medications:     Discharge Medications      New Medications      Instructions Start Date   HYDROcodone-acetaminophen 5-325 MG per tablet  Commonly known as: Norco   1 tablet, Oral, Every 6 Hours PRN      methocarbamol 500 MG tablet  Commonly known as: ROBAXIN   500 mg, Oral, Every 8 Hours PRN         Continue These Medications      Instructions Start Date   aspirin 81 MG tablet   81 mg, Oral, Daily      butalbital-acetaminophen-caffeine -40 MG per tablet  Commonly known as: FIORICET, ESGIC   TAKE 1 TABLET BY MOUTH TWICE DAILY AS NEEDED FOR HEADACHE      eletriptan 40 MG tablet  Commonly known as: RELPAX   TK ONE T PO AS NEEDED FOR MIGRAINE. CAN REPEAT IN TWO HOURS. MAX IS 2 IN 24 HOURS AND 4 IN A WEEK      melatonin 3 MG tablet   3 mg, Oral, Nightly      predniSONE 20 MG tablet  Commonly known as: DELTASONE   20 mg, Oral, As Needed      rosuvastatin 10 MG tablet  Commonly known as: CRESTOR   10 mg, Oral, Nightly      sildenafil 50 MG tablet  Commonly known as: VIAGRA   50 mg, Oral      SUMAtriptan 100 MG tablet  Commonly known as: IMITREX   As directed      SUMAtriptan Succinate 6 MG/0.5ML injection  Commonly known as: IMITREX   INJECT 1 SYRINGE AT ONSET OF A HEADACHE, MAY REPEAT IN 2 HOURS IF HEADACHE PERSISTS. MAX OF 2 IN 24 HOURS OR 4 WEEKLY      verapamil  MG CR tablet  Commonly known as: CALAN-SR   Takes for headache           Discharge Diet: Regular diet     Discharge  Care Plan / Instructions:   Keep incisions clean and open to air.  May wash with soap and water.  Chest tube sites with dressings to keep on for 48 hours.  Ok to reapply dressing as needed after removal.      Activity at Discharge:   No heavy lifting greater than 5 pounds or a gallon of milk and refrain from driving until cleared.      Tobacco: Patient has been counseled as to smoking cessation. We discussed its benefits in regards to immediate recovery and the long-term benefits of avoidance of tobacco products.  We discussed the benefit of long-term health that tobacco cessation would convey.      BMI: Patient's Body mass index is 25.39 kg/m². BMI is above normal parameters. Recommendations include: educational material, nutrition counseling and referral to primary care    Follow-up Appointments: Carlos Anaya  is requested to see Swathi Vicente APRN within 1-2 weeks from time of discharge, to follow-up with DIONY Perales in 1 week with CXR to be completed prior to appointment, to follow up with Dr. Morrow in 6 weeks, and to follow-up with Dr. Adam of the pulmonary service in 6 weeks.  He is to follow up with Dr. Brown of the ENT service in 2 weeks.     Electronically signed by Torrey Morrow MD at 10/30/20 0948

## 2020-11-03 ENCOUNTER — READMISSION MANAGEMENT (OUTPATIENT)
Dept: CALL CENTER | Facility: HOSPITAL | Age: 60
End: 2020-11-03

## 2020-11-03 NOTE — OUTREACH NOTE
CT Surgery Week 1 Survey      Responses   [facility] patient discharged from?  [place]   Does the patient have one of the following disease processes/diagnoses(primary or secondary)?  Cardiothoracic surgery   Week 1 attempt successful?  Yes   Call start time  1531   Call end time  1540   Discharge diagnosis  Solitary pulmonary nodule, adenocarcinoma of left lung   Meds reviewed with patient/caregiver?  Yes   Is the patient having any side effects they believe may be caused by any medication additions or changes?  No   Does the patient have all medications related to this admission filled (includes all antibiotics, pain medications, cardiac medications, etc.)  Yes   Is the patient taking all medications as directed (includes completed medication regime)?  Yes   Does the patient have a primary care provider?   Yes   Does the patient have an appointment scheduled with their C/T surgeon?  Yes   Has the patient kept scheduled appointments due by today?  N/A   Psychosocial issues?  No   Did the patient receive a copy of their discharge instructions?  Yes   What is the patient's perception of their health status since discharge?  Improving   Is the patient/caregiver able to teach back normal signs of recovery?  Nausea and lack of appetite, Constipation, Depression or irritability, Pain or discomfort at incisional site   Is the patient /caregiver able to teach back basic post-op care?  Continue use of incentive spirometry at least 1 week post discharge, Practice 'cough and deep breath', Keep incision areas clean, dry and protected, Lifting as instructed by MD in discharge instructions, Take showers only when approved by MD-sponge bathe until then   Is the patient/caregiver able to teach back signs and symptoms of incisional infection?  Increased redness, swelling or pain at the incisonal site, Increased drainage or bleeding, Incisional warmth, Pus or odor from incision, Fever   If the patient is a current smoker, are  they able to teach back resources for cessation?  -- [Currently not smoking.]   Is the patient/caregiver able to teach back the hierarchy of who to call/visit for symptoms/problems? PCP, Specialist, Home health nurse, Urgent Care, ED, 911  Yes   Additional teach back comments  Patient states that he cannot say enough good about Dr. Morrow and his PA.  He is very pleased with his care and is impressed with Dr. Morrow and his PA.   Week 1 call completed?  Yes   Wrap up additional comments  Doing well.  No problems or questions at this time.            Arminda Diaz, RN

## 2020-11-05 ENCOUNTER — OFFICE VISIT (OUTPATIENT)
Dept: CARDIAC SURGERY | Facility: CLINIC | Age: 60
End: 2020-11-05

## 2020-11-05 ENCOUNTER — HOSPITAL ENCOUNTER (OUTPATIENT)
Dept: GENERAL RADIOLOGY | Facility: HOSPITAL | Age: 60
Discharge: HOME | End: 2020-11-05
Admitting: NURSE PRACTITIONER

## 2020-11-05 VITALS
WEIGHT: 188.8 LBS | BODY MASS INDEX: 25.02 KG/M2 | SYSTOLIC BLOOD PRESSURE: 112 MMHG | HEART RATE: 71 BPM | DIASTOLIC BLOOD PRESSURE: 68 MMHG | HEIGHT: 73 IN | OXYGEN SATURATION: 100 %

## 2020-11-05 DIAGNOSIS — C34.92 ADENOCARCINOMA OF LEFT LUNG (HCC): ICD-10-CM

## 2020-11-05 DIAGNOSIS — F17.209 NICOTINE DEPENDENCE WITH NICOTINE-INDUCED DISORDER, UNSPECIFIED NICOTINE PRODUCT TYPE: ICD-10-CM

## 2020-11-05 DIAGNOSIS — R91.1 SOLITARY PULMONARY NODULE: Primary | ICD-10-CM

## 2020-11-05 DIAGNOSIS — R91.1 SOLITARY PULMONARY NODULE: ICD-10-CM

## 2020-11-05 PROCEDURE — 71046 X-RAY EXAM CHEST 2 VIEWS: CPT

## 2020-11-05 PROCEDURE — 99024 POSTOP FOLLOW-UP VISIT: CPT | Performed by: NURSE PRACTITIONER

## 2020-11-05 RX ORDER — NICOTINE 21 MG/24HR
1 PATCH, TRANSDERMAL 24 HOURS TRANSDERMAL EVERY 24 HOURS
Qty: 30 PATCH | Refills: 0 | Status: SHIPPED | OUTPATIENT
Start: 2020-11-05 | End: 2020-11-30

## 2020-11-05 NOTE — PROGRESS NOTES
Subjective   Chief Complaint   Patient presents with   • Post-op Follow-up     Thoracoscopy on 10/29       Patient ID: Carlos Anaya is a 60 y.o. male who is here for follow-up having hadLEFT THORACOSCOPY WITH DAVINCI ROBOT WITH WEDGE RESECTION, LOBECTOMY, MEDIASTINAL LYMPH NODE DISSECTION, BRONCHOSCOPY performed on 10/27/2020 by Dr. Morrow.      History of Present Illness  Post operative recovery was uneventful without any major complications. Sleep habits are great. Pain control has been great.  Only needing pain medication before he goes for a walk.  No fevers/sweats/chills. No drainage from incisions.  No chest pain or shortness of breath. Appetite is good. Denies tobacco use.  He is requesting medication for smoking cessation.  He has been using nicorette gum but feels he needs something to curb cravings. Ambulating twice daily.  He is requesting to go back to work on Monday.      The following portions of the patient's history were reviewed and updated as appropriate: allergies, current medications, past family history, past medical history, past social history, past surgical history and problem list.    Review of Systems   Constitutional: Negative for activity change, appetite change, chills, diaphoresis, fatigue and fever.   HENT: Negative for trouble swallowing and voice change.    Eyes: Negative for visual disturbance.   Respiratory: Negative for cough, choking, chest tightness, shortness of breath and wheezing.    Cardiovascular: Negative for chest pain, palpitations and leg swelling.   Gastrointestinal: Negative for abdominal pain, constipation, diarrhea, nausea and vomiting.   Musculoskeletal: Negative for arthralgias and myalgias.   Skin: Negative for color change, pallor, rash and wound.   Neurological: Negative for dizziness, weakness and light-headedness.   Psychiatric/Behavioral: Negative for agitation, confusion and sleep disturbance.       Objective   Visit Vitals  /68 (BP Location: Left arm,  "Patient Position: Sitting, Cuff Size: Adult)   Pulse 71   Ht 185 cm (72.84\")   Wt 85.6 kg (188 lb 12.8 oz)   SpO2 100%   BMI 25.02 kg/m²       Physical Exam  Vitals signs reviewed.   Constitutional:       Appearance: Normal appearance.   HENT:      Head: Normocephalic.   Eyes:      Pupils: Pupils are equal, round, and reactive to light.   Cardiovascular:      Rate and Rhythm: Normal rate and regular rhythm.      Heart sounds: Normal heart sounds. No murmur.   Pulmonary:      Effort: Pulmonary effort is normal.      Breath sounds: Normal breath sounds. No wheezing or rales.   Abdominal:      General: There is no distension.      Palpations: Abdomen is soft.      Tenderness: There is no abdominal tenderness.   Musculoskeletal:         General: No swelling or tenderness.   Skin:     General: Skin is warm and dry.      Comments: Thoracic incisions are C/D/I and healing nicely   Neurological:      General: No focal deficit present.      Mental Status: He is alert and oriented to person, place, and time.   Psychiatric:         Mood and Affect: Mood normal.         Behavior: Behavior normal.         Thought Content: Thought content normal.         Judgment: Judgment normal.             Assessment/Plan     Independent Review of Radiographic Studies:    Pneumothorax is noted on the left.  Otherwise stable appearance of the chest.  This was reviewed with Dr. Morrow.    Diagnoses and all orders for this visit:    1. Solitary pulmonary nodule (Primary)  -     CT chest w contrast; Future    2. Adenocarcinoma of left lung (CMS/HCC)    3. Nicotine dependence with nicotine-induced disorder, unspecified nicotine product type    Other orders  -     nicotine (NICODERM CQ) 14 MG/24HR patch; Place 1 patch on the skin as directed by provider Daily.  Dispense: 30 patch; Refill: 0           Overall, Carlos Anaya is doing well.   He does not report any worsening shortness of breath or chest pain.  We will plan follow up with Dr. Morrow in 1 month " with CT chest to be completed prior to appointment.  He has been advised to contact our office if any shortness of breath of left chest pain develops or report to the ER. He is following post op thoracic surgery home instructions. Provided support and encouragement. All questions have been answered to the best of my ability.  Patient has been instructed to contact our office with any questions or concerns should they arise prior to the next office visit.      Patient's Body mass index is 25.02 kg/m². BMI is above normal parameters. Recommendations include: educational material, nutrition counseling and referral to primary care.    I have congratulated Carlos Anaya on successful smoking cessation.    I have educated him on the risk of diseases from using tobacco products such as cancer, COPD and heart disease.     I spent 3  minutes counseling the patient.    Advance Care Planning   ACP discussion was held with the patient during this visit. Patient does not have an advance directive, declines further assistance.    Follow up 1 month or sooner if needed.  Script for Nicotine patches provided.

## 2020-11-09 NOTE — H&P
Following H&P reviewed, no changes, to OR for Robotic Left upper lobe wedge, possible lobectomy.    Torrey Morrow M.D.  Cardiothoracic Surgeon    Thoracic Surgery Consultation 10/8/20     Referring Physician: Dr. Jimbo Adam     Primary Care Physician:           Chief Complaint   Patient presents with   • Lung Nodule       New patient from [facility].                Subjective []Expand by Default        Mr. Anaya is a 60-year-old male who presents with a left upper lobe lung nodule.  He has been followed by Dr. Jimbo Adam, the nodule has been enlarging and is therefore referred to me for possible treatment.  This nodule was originally found on a screening CT scan.  Patient is a smoker and continues to smoke.  He smoked 1 pack/day for 40 years.  He works in [employer], but uses protective gear.  He has an occasional nonproductive cough.  He denies hemoptysis, fevers, chills, unexpected weight loss, and night sweats.  He has no personal history of cancer.  His mother  at the age of 72 with lung cancer, she was also a smoker.  Patient underwent a PET scan which showed FDG avidity at the location of the nodule, there is no evidence of metastatic disease.  Functionally, Mr. Anaya is very active and able to do any activities he wants.           Review of Systems   Constitutional: Negative for fatigue and unexpected weight change.   Respiratory: Negative for cough, chest tightness and shortness of breath.          A complete 10 system review of systems was performed, is negative except stated above.     Medical History   Past Medical History:   Diagnosis Date   • High cholesterol     • Migraines          Surgical History         Past Surgical History:   Procedure Laterality Date   • BACK SURGERY       • COLONOSCOPY       • HERNIA REPAIR                  Family History   Problem Relation Age of Onset   • Lung cancer Mother     • Breast cancer Father        Social History            Tobacco Use   • Smoking status:  "Current Every Day Smoker       Packs/day: 1.00       Years: 40.00       Pack years: 40.00       Types: Cigarettes   • Smokeless tobacco: Never Used   Substance Use Topics   • Alcohol use: Not Currently   • Drug use: Never      Current Medications[]Expand by Default          Current Outpatient Medications   Medication Sig Dispense Refill   • aspirin 81 MG tablet Take 81 mg by mouth.       • butalbital-acetaminophen-caffeine (FIORICET, ESGIC) -40 MG per tablet TAKE 1 TABLET BY MOUTH TWICE DAILY AS NEEDED FOR HEADACHE       • melatonin 3 MG tablet Take 3 mg by mouth.       • predniSONE (DELTASONE) 20 MG tablet         • rosuvastatin (CRESTOR) 10 MG tablet Take 10 mg by mouth.       • sildenafil (VIAGRA) 50 MG tablet Take 50 mg by mouth.       • SUMAtriptan (IMITREX) 100 MG tablet As directed       • SUMAtriptan Succinate (IMITREX) 6 MG/0.5ML injection INJECT 1 SYRINGE AT ONSET OF A HEADACHE, MAY REPEAT IN 2 HOURS IF HEADACHE PERSISTS. MAX OF 2 IN 24 HOURS OR 4 WEEKLY       • verapamil SR (CALAN-SR) 240 MG CR tablet TAKE 1 TABLET BY MOUTH TWICE DAILY       • eletriptan (RELPAX) 40 MG tablet TK ONE T PO AS NEEDED FOR MIGRAINE. CAN REPEAT IN TWO HOURS. MAX IS 2 IN 24 HOURS AND 4 IN A WEEK          No current facility-administered medications for this visit.         Allergies:  Patient has no known allergies.        Objective []Expand by Default         Vital Signs  Visit Vitals  /70 (BP Location: Left arm, Patient Position: Sitting, Cuff Size: Adult)   Pulse 69   Ht 185.4 cm (73\")   Wt 86.6 kg (191 lb)   SpO2 100%   BMI 25.20 kg/m²            Physical Exam  Constitutional:       General: He is not in acute distress.     Appearance: He is well-developed. He is not diaphoretic.   HENT:      Head: Normocephalic and atraumatic.      Right Ear: External ear normal.      Left Ear: External ear normal.   Eyes:      General:         Right eye: No discharge.         Left eye: No discharge.      Pupils: Pupils are " equal, round, and reactive to light.   Neck:      Musculoskeletal: Normal range of motion and neck supple.      Vascular: No JVD.      Trachea: No tracheal deviation.   Cardiovascular:      Rate and Rhythm: Normal rate and regular rhythm.      Heart sounds: Normal heart sounds. No murmur.   Pulmonary:      Effort: Pulmonary effort is normal. No respiratory distress.      Breath sounds: Normal breath sounds. No stridor. No wheezing.   Abdominal:      General: There is no distension.      Palpations: Abdomen is soft.      Tenderness: There is no abdominal tenderness. There is no guarding.   Musculoskeletal: Normal range of motion.         General: No tenderness or deformity.   Skin:     General: Skin is warm and dry.      Capillary Refill: Capillary refill takes less than 2 seconds.      Coloration: Skin is not pale.      Findings: No erythema or rash.   Neurological:      Mental Status: He is alert and oriented to person, place, and time.      Motor: No abnormal muscle tone.      Coordination: Coordination normal.   Psychiatric:         Behavior: Behavior normal.         Thought Content: Thought content normal.         Judgment: Judgment normal.            Results Review:            WBC   Date Value Ref Range Status   08/14/2020 10.0 4.8 - 10.8 K/uL Final            RBC   Date Value Ref Range Status   08/14/2020 4.50 (L) 4.70 - 6.10 M/uL Final            Hemoglobin   Date Value Ref Range Status   08/14/2020 15.1 14.0 - 18.0 g/dL Final            Hematocrit   Date Value Ref Range Status   08/14/2020 45.4 42.0 - 52.0 % Final            MCV   Date Value Ref Range Status   08/14/2020 100.9 (H) 80.0 - 94.0 fL Final            MCH   Date Value Ref Range Status   08/14/2020 33.6 (H) 27.0 - 31.0 pg Final            MCHC   Date Value Ref Range Status   08/14/2020 33.3 33.0 - 37.0 g/dL Final            RDW   Date Value Ref Range Status   08/14/2020 13.3 11.5 - 14.5 % Final            MPV   Date Value Ref Range Status    08/14/2020 9.7 9.4 - 12.4 fL Final            Platelets   Date Value Ref Range Status   08/14/2020 247 130 - 400 K/uL Final            Neutrophil Rel %   Date Value Ref Range Status   09/17/2019 51.0 50.0 - 65.0 % Final            Lymphocyte Rel %   Date Value Ref Range Status   09/17/2019 37.5 20.0 - 40.0 % Final            Monocyte Rel %   Date Value Ref Range Status   09/17/2019 8.1 0.0 - 10.0 % Final            Eosinophil Rel %   Date Value Ref Range Status   09/17/2019 2.5 0.0 - 5.0 % Final            Basophil Rel %   Date Value Ref Range Status   09/17/2019 0.7 0.0 - 1.0 % Final            Neutrophils Absolute   Date Value Ref Range Status   09/17/2019 5.4 1.5 - 7.5 K/uL Final            Lymphocytes Absolute   Date Value Ref Range Status   09/17/2019 4.0 1.1 - 4.5 K/uL Final            Monocytes Absolute   Date Value Ref Range Status   09/17/2019 0.90 0.00 - 0.90 K/uL Final            Eosinophils Absolute   Date Value Ref Range Status   09/17/2019 0.30 0.00 - 0.60 K/uL Final            Basophils Absolute   Date Value Ref Range Status   09/17/2019 0.10 0.00 - 0.20 K/uL Final            Immature Grans, Absolute   Date Value Ref Range Status   09/17/2019 0.0 K/uL Final            Glucose   Date Value Ref Range Status   08/14/2020 89 74 - 109 mg/dL Final            Sodium   Date Value Ref Range Status   08/14/2020 143 136 - 145 mmol/L Final            Potassium   Date Value Ref Range Status   08/14/2020 4.8 3.5 - 5.0 mmol/L Final            CO2   Date Value Ref Range Status   08/14/2020 23 22 - 29 mmol/L Final            Chloride   Date Value Ref Range Status   08/14/2020 106 98 - 111 mmol/L Final            Anion Gap   Date Value Ref Range Status   08/14/2020 14 7 - 19 mmol/L Final            Creatinine   Date Value Ref Range Status   08/14/2020 1 0.5 - 1.2 mg/dL Final            BUN   Date Value Ref Range Status   08/14/2020 10 6 - 20 mg/dL Final            Calcium   Date Value Ref Range Status   08/14/2020  9.0 8.6 - 10.0 mg/dL Final              eGFR Non  Am   Date Value Ref Range Status   08/14/2020 >60 >60 Final       Comment:       This calculation may be inaccurate for patients under the age of 18 years.  For ages 18 and older, a GFR >60 mL/min/1.73m2 (not corrected for weight) is  valid for stable renal function.            Alkaline Phosphatase   Date Value Ref Range Status   08/14/2020 88 40 - 130 U/L Final            Total Protein   Date Value Ref Range Status   08/14/2020 6.6 6.6 - 8.7 g/dL Final            ALT (SGPT)   Date Value Ref Range Status   08/14/2020 12 5 - 41 U/L Final            AST (SGOT)   Date Value Ref Range Status   08/14/2020 19 5 - 40 U/L Final            Total Bilirubin   Date Value Ref Range Status   08/14/2020 0.3 0.2 - 1.2 mg/dL Final            Albumin   Date Value Ref Range Status   08/14/2020 4.2 3.5 - 5.2 g/dL Final                     I reviewed the patient's clinical results and discussed with patient.     CT Chest: Impression:  1. Interval increase in size of a spiculated nodule in the left upper  lobe, concerning for neoplasm.  2. Additional noncalcified pulmonary nodules throughout the lungs  appear stable to slightly decreased in size and should be followed on  subsequent CT exams.  3. Emphysema.  4. Atherosclerosis of the aorta and coronary arteries.  5. No suspicious lymphadenopathy.  Signed by Dr Mike Juares on 9/16/2020 2:52 PM     PET Scan: 1. 3 pulmonary nodules are identified. This includes a 1.2 cm left  upper lobe nodule which displays a low level PET uptake (SUV max 1.9).  This lesion could certainly reflect a primary lung malignancy, perhaps  more indolent subtype given the low level uptake.   2. 2 other right lung nodules nodules, measuring 1.0 cm and 0.7 cm,  demonstrate no measurable PET uptake.  3. There is a focal rounded radiotracer uptake in the posterior  nasopharynx (SUV max 4.2). This could reflect lymphoid tissue in the  posterior nasopharynx.  Direct visualization of the posterior  nasopharynx should be considered to help exclude squamous carcinoma.  4. No suspicious adenopathy.  Signed by Dr Frederic Louie on 9/30/2020 2:14 PM     I personally reviewed images of following exams, the following is my interpretation:     CT Chest: Spiculated left upper lobe nodule in the superior segments, away from visceral pleura but likely palpable.  Other small nodules in right lung that are stable in size  PET Scan: FDG avidity of left upper lobe nodule, no activity in mediastinum, no activity at sites of other nodules     I personally reviewed following outside hospital records:          Problem List Items Addressed This Visit              Bilateral carotid artery stenosis       BPH (benign prostatic hyperplasia)       Nicotine dependence       Relevant Orders       NM Pet Skull Base To Mid Thigh                     Other Visit Diagnoses      Left upper lobe pulmonary nodule    -  Primary     Relevant Orders     NM Pet Skull Base To Mid Thigh     Centrilobular emphysema (CMS/HCC)           lung nodule of concern is increasing.  Discussed differential dx of granuloma vs malignancy vs other indolent benign process     Patient's Body mass index is 25.15 kg/m². BMI is within normal parameters. No follow-up required..        We will get PET to better characterize lung nodule, follow up by phone  Smoking cessation recommended, especially if nodule is to be resected  No need for rx copd currently in absence of symptoms  Should we need to treat copd, need to be careful with anticholinergics which can aggravate prostate symptoms.  Will need to get PFT if surgery is contemplated  Will consider britt/robotic bronch if PET scan indeterminate        Electronically signed by Jimbo Adam MD, 9/21/2020, 09:47 CDT    12     Carlos Anaya  reports that he has been smoking cigarettes. He has a 40.00 pack-year smoking history. He has never used smokeless tobacco.. I have educated him  on the risk of diseases from using tobacco products such as cancer, COPD, heart disease and operative risks.      I advised him to quit and he is willing to quit. We have discussed the following method/s for tobacco cessation:  OTC Cessation Products Prescription Medicaiton.  Together we have set a quit date for 1 week from today.  He will follow up with me in a couple of weeks at time of surgery . or sooner to check on his progress.     I spent 10 minutes counseling the patient.                    Assessment/Plan         Mr. Anaya is a 60-year-old male with a left upper lobe nodule that is concerning for a primary lung malignancy.  He is at high risk for lung cancer given his smoking history.  This nodule is increased in size and low-level FDG activity.  He has other nodules that are stable in size with no activity, although these are is too small to characterize with PET scan.  I suspect Mr. Anaya's pulmonary reserve is very good given his level of activity on a daily basis.  We will confirm this with PFTs.  Given the concern for malignancy of the left upper lobe nodule, I think it is prudent to proceed with surgical treatment.      Clinical Stage if Malignancy: [id number]  Ed[place]son Nodule Risk of Malignancy: 28.6%     We discussed treatment options including a preoperative biopsy, radiotherapy, surgery, or watchful waiting.  We also discussed the differential diagnosis of a lung nodule to include granulomatous disease, malignancy, scar tissue, infection or inflammation.  After discussion we decided the best treatment option would be robot-assisted wedge resection followed by lobectomy if frozen section consistent with carcinoma.  I would also sample mediastinal lymph nodes at the same time.  After confirming adequate lung reserve with the PFTs, we will find a date for surgery.     We discussed the risk and benefits of robotic wedge resection and possible lobectomy with lymph node sampling.   The risk of surgery were  discussed including bleeding, infection, injury to large vessels, need for transfusion, need to convert to an open procedure.  There is also a risk of pneumonia, prolonged airleak, anesthesia risk, chronic pain and/or death.    We also discussed that these risks are increased if the patient does not quit smoking.  These were all discussed with the patient and they agree to proceed.     Thank you for trusting me with the care of Mr. Anaya.  Please do not hesitate to call with any questions or concerns.     Torrey Morrow M.D.  Cardiothoracic Surgeon

## 2020-11-13 ENCOUNTER — TRANSCRIBE ORDERS (OUTPATIENT)
Dept: ADMINISTRATIVE | Facility: HOSPITAL | Age: 60
End: 2020-11-13

## 2020-11-13 DIAGNOSIS — Z01.818 PRE-OP TESTING: Primary | ICD-10-CM

## 2020-11-16 ENCOUNTER — LAB (OUTPATIENT)
Dept: LAB | Facility: HOSPITAL | Age: 60
End: 2020-11-16

## 2020-11-16 PROCEDURE — C9803 HOPD COVID-19 SPEC COLLECT: HCPCS | Performed by: OTOLARYNGOLOGY

## 2020-11-16 PROCEDURE — U0003 INFECTIOUS AGENT DETECTION BY NUCLEIC ACID (DNA OR RNA); SEVERE ACUTE RESPIRATORY SYNDROME CORONAVIRUS 2 (SARS-COV-2) (CORONAVIRUS DISEASE [COVID-19]), AMPLIFIED PROBE TECHNIQUE, MAKING USE OF HIGH THROUGHPUT TECHNOLOGIES AS DESCRIBED BY CMS-2020-01-R: HCPCS | Performed by: OTOLARYNGOLOGY

## 2020-11-17 LAB
COVID LABCORP PRIORITY: NORMAL
SARS-COV-2 RNA RESP QL NAA+PROBE: NOT DETECTED

## 2020-11-18 NOTE — PROGRESS NOTES
YOB: 1960  Location: [facility]  Location Address: [address] [place] [address],  3, [address] [place] [address]  Location Phone: [phone]    Chief Complaint   Patient presents with   • Post-op       History of Present Illness  Carlos Anaya is a 60 y.o. male.  Carlos Anaya is here for follow up of ENT complaints. The patient is status post rigid nasal endoscopy with biopsy of the nasopharyngeal area on 10/27/20. The patient has recovered well without complaints.     The biopsy of the nasopharyngeal area was benign.    Final Diagnosis   1.  Left lower lobe of lung, wedge resection:  Invasive adenocarcinoma, moderately differentiated  The tumor measures 0.9 cm in greatest dimension  See CAP synoptic report    2.  Nasopharyngeal biopsy:  Hyperkeratosis with atypia  Lymphoid hyperplasia  Negative for invasive neoplasm    3.  Level 10  bilingular artery lymph node, excision:  Negative for metastatic tumor 0/1    4.  Left upper lobe of lung (320 g), lobectomy:  Invasive adenocarcinoma involving the visceral pleural (Block 4D)  Parenchymal margin shows type II pneumocyte atypia, negative for tumor  Emphysema  5 peribronchial lymph nodes, all negative for metastatic tumor 0/5  Bronchial and vascular margins are negative    5.  Level 8 lymph node:  Negative for tumor 0/1    6.  Level 8 lymph node #2:  Negative for tumor 0/1    7.  Level 8 lymph node #3:  Negative for tumor 0/1      8.  Level 9 lymph node:  Negative for tumor 0/1    9.  Level 7 lymph node:  Negative for tumor 0/1    10.  Level 5 lymph node:  3 lymph nodes, all negative for tumor negative for tumor 0/3    11.  Level 10 interfissural lymph node:  Negative for tumor 0/1          Result Impression   1. 3 pulmonary nodules are identified. This includes a 1.2 cm left  upper lobe nodule which displays a low level PET uptake (SUV max 1.9).  This lesion could certainly reflect a primary lung malignancy, perhaps  more indolent subtype given the low level uptake.    2. 2 other right lung nodules nodules, measuring 1.0 cm and 0.7 cm,  demonstrate no measurable PET uptake.  3. There is a focal rounded radiotracer uptake in the posterior  nasopharynx (SUV max 4.2). This could reflect lymphoid tissue in the  posterior nasopharynx. Direct visualization of the posterior  nasopharynx should be considered to help exclude squamous carcinoma.  4. No suspicious adenopathy.  Signed by Dr Frederic Louie on 9/30/2020 2:14 PM   Result Narrative   PET CT SKULL BASE TO MID THIGH 9/30/2020 9:44 AM  HISTORY: R91.1  Comparison: CT scan dated 9/16/2020   Radiopharmaceutical: 15.48 mCi F-18 FDG intravenously.   Technique: Prior to injection of the radiotracer, the patient's blood  glucose is 87mg/dL. Following injection and a 60 minute distribution  interval, PET scan is performed from the skull base to the midthigh.  Low dose, noncontrast CT is performed in the same anatomic  distribution for attenuation correction of the PET scan and to assist  in localizing the PET findings.    FINDINGS:   HEAD AND NECK: The visualized brain demonstrates no definite focal  abnormal FDG activity. There is a small focal radiotracer uptake in  the midline upper posterior nasopharynx with SUV max 4.2. Symmetric  bilateral tonsillar uptake. No suspicious uptake in the larynx. No  hypermetabolic cervical adenopathy.  CHEST: 1.2 cm left upper lobe pulmonary nodule displays only low level  FDG uptake, with SUV max 1.9. No other hypermetabolic pulmonary  nodules are demonstrated. There is an additional 1 cm right upper lobe  nodule which is completely occult by PET. There is an additional 0.7  cm right middle lobe nodule which displays no measurable PET uptake.  Review of the mediastinum reveals no hypermetabolic lymphadenopathy.  Physiologic activity is noted in the myocardium. No foci of  hypermetabolism are appreciated in either breast or either axilla.   ABDOMEN AND PELVIS: Normal physiologic activity is noted in the  liver,  spleen, stomach and bowel. No adrenal hypermetabolism is noted.  Urinary activity is noted in the kidneys and bladder. No FDG-avid  lymphadenopathy is appreciated.   MUSCULOSKELETAL: No FDG-avid osseous metastases are demonstrated.    Other Result Information   Susie العراقي Incoming Radiology Results From [facility] - 09/30/2020  1:16 PM CDT  PET CT SKULL BASE TO MID THIGH 9/30/2020 9:44 AM  HISTORY: R91.1  Comparison: CT scan dated 9/16/2020   Radiopharmaceutical: 15.48 mCi F-18 FDG intravenously.   Technique: Prior to injection of the radiotracer, the patient's blood  glucose is 87mg/dL. Following injection and a 60 minute distribution  interval, PET scan is performed from the skull base to the midthigh.  Low dose, noncontrast CT is performed in the same anatomic  distribution for attenuation correction of the PET scan and to assist  in localizing the PET findings.    FINDINGS:   HEAD AND NECK: The visualized brain demonstrates no definite focal  abnormal FDG activity. There is a small focal radiotracer uptake in  the midline upper posterior nasopharynx with SUV max 4.2. Symmetric  bilateral tonsillar uptake. No suspicious uptake in the larynx. No  hypermetabolic cervical adenopathy.  CHEST: 1.2 cm left upper lobe pulmonary nodule displays only low level  FDG uptake, with SUV max 1.9. No other hypermetabolic pulmonary  nodules are demonstrated. There is an additional 1 cm right upper lobe  nodule which is completely occult by PET. There is an additional 0.7  cm right middle lobe nodule which displays no measurable PET uptake.  Review of the mediastinum reveals no hypermetabolic lymphadenopathy.  Physiologic activity is noted in the myocardium. No foci of  hypermetabolism are appreciated in either breast or either axilla.   ABDOMEN AND PELVIS: Normal physiologic activity is noted in the liver,  spleen, stomach and bowel. No adrenal hypermetabolism is noted.  Urinary activity is noted in the kidneys and  bladder. No FDG-avid  lymphadenopathy is appreciated.   MUSCULOSKELETAL: No FDG-avid osseous metastases are demonstrated.   IMPRESSION:  1. 3 pulmonary nodules are identified. This includes a 1.2 cm left  upper lobe nodule which displays a low level PET uptake (SUV max 1.9).  This lesion could certainly reflect a primary lung malignancy, perhaps  more indolent subtype given the low level uptake.   2. 2 other right lung nodules nodules, measuring 1.0 cm and 0.7 cm,  demonstrate no measurable PET uptake.  3. There is a focal rounded radiotracer uptake in the posterior  nasopharynx (SUV max 4.2). This could reflect lymphoid tissue in the  posterior nasopharynx. Direct visualization of the posterior  nasopharynx should be considered to help exclude squamous carcinoma.  4. No suspicious adenopathy.          Past Medical History:   Diagnosis Date   • High cholesterol    • Lung nodule    • Migraines        Past Surgical History:   Procedure Laterality Date   • BACK SURGERY     • BRONCHOSCOPY N/A 10/27/2020    Procedure: BRONCHOSCOPY;  Surgeon: Torrey Morrow MD;  Location: [facility] OR;  Service: Cardiothoracic;  Laterality: N/A;   • COLONOSCOPY     • HERNIA REPAIR     • LARYNGOSCOPY N/A 10/27/2020    Procedure: Direct laryngoscopy, rigid nasal endoscopy with biopsy;  Surgeon: Rick Brown MD;  Location: [facility] OR;  Service: ENT;  Laterality: N/A;   • NASAL ENDOSCOPY N/A 10/27/2020    Procedure: rigid nasal endoscopy with biopsy;  Surgeon: Rick Brown MD;  Location:  [facility] OR;  Service: ENT;  Laterality: N/A;   • THORACOSCOPY Left 10/27/2020    Procedure: LEFT THORACOSCOPY WITH DAVINCI ROBOT WITH WEDGE RESECTION, LOBECTOMY, MEDIASTINAL LYMPH NODE DISSECTION;  Surgeon: Torrey Mororw MD;  Location: [facility] OR;  Service: DaVinci;  Laterality: Left;       Outpatient Medications Marked as Taking for the 11/19/20 encounter (Office Visit) with Rick Brwon MD   Medication Sig Dispense Refill   • aspirin 81  MG tablet Take 81 mg by mouth Daily.     • butalbital-acetaminophen-caffeine (FIORICET, ESGIC) -40 MG per tablet TAKE 1 TABLET BY MOUTH TWICE DAILY AS NEEDED FOR HEADACHE     • eletriptan (RELPAX) 40 MG tablet TK ONE T PO AS NEEDED FOR MIGRAINE. CAN REPEAT IN TWO HOURS. MAX IS 2 IN 24 HOURS AND 4 IN A WEEK     • melatonin 3 MG tablet Take 3 mg by mouth Every Night.     • methocarbamol (ROBAXIN) 500 MG tablet Take 1 tablet by mouth Every 8 (Eight) Hours As Needed for Muscle Spasms. 30 tablet 0   • nicotine (NICODERM CQ) 14 MG/24HR patch Place 1 patch on the skin as directed by provider Daily. 30 patch 0   • predniSONE (DELTASONE) 20 MG tablet Take 20 mg by mouth As Needed.     • rosuvastatin (CRESTOR) 10 MG tablet Take 10 mg by mouth Every Night.     • sildenafil (VIAGRA) 50 MG tablet Take 50 mg by mouth.     • SUMAtriptan (IMITREX) 100 MG tablet As directed     • SUMAtriptan Succinate (IMITREX) 6 MG/0.5ML injection INJECT 1 SYRINGE AT ONSET OF A HEADACHE, MAY REPEAT IN 2 HOURS IF HEADACHE PERSISTS. MAX OF 2 IN 24 HOURS OR 4 WEEKLY     • verapamil SR (CALAN-SR) 240 MG CR tablet Takes for headache         Patient has no known allergies.    Family History   Problem Relation Age of Onset   • Lung cancer Mother    • Breast cancer Father        Social History     Socioeconomic History   • Marital status:      Spouse name: Not on file   • Number of children: Not on file   • Years of education: Not on file   • Highest education level: Not on file   Tobacco Use   • Smoking status: Former Smoker     Packs/day: 1.00     Years: 40.00     Pack years: 40.00     Types: Cigarettes     Quit date: 10/27/2020     Years since quittin.0   • Smokeless tobacco: Never Used   Substance and Sexual Activity   • Alcohol use: Not Currently   • Drug use: Never   • Sexual activity: Defer       Review of Systems   Constitutional: Negative for activity change, appetite change, chills, diaphoresis, fatigue, fever and unexpected  weight change.   HENT: Negative for congestion, ear discharge, ear pain, facial swelling, hearing loss, mouth sores, nosebleeds, postnasal drip, rhinorrhea, sinus pressure, sneezing, sore throat, tinnitus, trouble swallowing and voice change.    Eyes: Negative for pain, discharge, redness, itching and visual disturbance.   Respiratory: Negative for apnea, cough, choking, chest tightness, shortness of breath, wheezing and stridor.    Gastrointestinal: Negative for nausea and vomiting.   Endocrine: Negative for cold intolerance and heat intolerance.   Musculoskeletal: Negative for arthralgias, back pain, gait problem, neck pain and neck stiffness.   Skin: Negative for rash.   Allergic/Immunologic: Negative for environmental allergies and food allergies.   Neurological: Negative for dizziness, tremors, seizures, syncope, facial asymmetry, speech difficulty, weakness, light-headedness, numbness and headaches.   Hematological: Negative for adenopathy. Does not bruise/bleed easily.   Psychiatric/Behavioral: Negative for behavioral problems, sleep disturbance and suicidal ideas. The patient is not nervous/anxious and is not hyperactive.        Vitals:    11/19/20 1443   BP: 109/63   Pulse: 77   Temp: 98 °F (36.7 °C)       Body mass index is 24.8 kg/m².    Objective     Physical Exam  Physical Exam  CONSTITUTIONAL: well nourished, alert, oriented, in no acute distress      COMMUNICATION AND VOICE: able to communicate normally, normal voice quality     HEAD: normocephalic, no lesions, atraumatic, no tenderness, no masses      FACE: appearance normal, no lesions, no tenderness, no deformities, facial motion symmetric     SALIVARY GLANDS: parotid glands with no tenderness, no swelling, no masses, submandibular glands with normal size, nontender     EYES: ocular motility normal, eyelids normal, orbits normal, no proptosis, conjunctiva normal , pupils equal, round      EARS:  Hearing: response to conversational voice normal  bilaterally   External Ears: auricles without lesions  Otoscopic: tympanic membrane appearance normal, no lesions, no perforation, normal mobility, no fluid     NOSE:  External Nose: structure normal, no tenderness on palpation, no nasal discharge, no lesions, no evidence of trauma, nostrils patent   Intranasal Exam: nasal mucosa erythema and edema, vestibule within normal limits, inferior turbinate normal, nasal septum midline      ORAL:  Lips: upper and lower lips without lesion   Teeth: dentition within normal limits for age   Gums: gingivae healthy   Oral Mucosa: oral mucosa normal, no mucosal lesions   Floor of Mouth: Warthin’s duct patent, mucosa normal  Tongue: lingual mucosa normal without lesions, normal tongue mobility   Palate: soft and hard palates with normal mucosa and structure  Oropharynx: oropharyngeal mucosa normal with mild postnasal drainage     NECK: neck appearance normal, no mass,  noted without erythema or tenderness     THYROID: no overt thyromegaly, no tenderness, nodules or mass present on palpation, position midline      LYMPH NODES: no lymphadenopathy     CHEST/RESPIRATORY: respiratory effort normaL     CARDIOVASCULAR: extremities without cyanosis or edema       NEUROLOGIC/PSYCHIATRIC: oriented to time, place and person, mood normal, affect appropriate, CN II-XII intact grossly    Assessment/Plan   Diagnoses and all orders for this visit:    1. Nasopharyngitis (Primary)  Comments:  S/P biopsy of nasopharyngeal area/negative for malignancy   Orders:  -     fluticasone (FLONASE) 50 MCG/ACT nasal spray; 2 sprays into the nostril(s) as directed by provider Daily.  Dispense: 16 g; Refill: 11      * Surgery not found *  No orders of the defined types were placed in this encounter.    Return if symptoms worsen or fail to improve.       Patient Instructions   Continue treatment as directed, if symptoms develop call for appointment.    The patient understands and agrees with assessment and  plan.

## 2020-11-19 ENCOUNTER — OFFICE VISIT (OUTPATIENT)
Dept: OTOLARYNGOLOGY | Facility: CLINIC | Age: 60
End: 2020-11-19

## 2020-11-19 VITALS
SYSTOLIC BLOOD PRESSURE: 109 MMHG | BODY MASS INDEX: 24.92 KG/M2 | DIASTOLIC BLOOD PRESSURE: 63 MMHG | TEMPERATURE: 98 F | HEART RATE: 77 BPM | WEIGHT: 188 LBS | HEIGHT: 73 IN

## 2020-11-19 DIAGNOSIS — J00 NASOPHARYNGITIS: Primary | ICD-10-CM

## 2020-11-19 PROCEDURE — 99213 OFFICE O/P EST LOW 20 MIN: CPT | Performed by: PHYSICIAN ASSISTANT

## 2020-11-19 RX ORDER — FLUTICASONE PROPIONATE 50 MCG
2 SPRAY, SUSPENSION (ML) NASAL DAILY
Qty: 16 G | Refills: 11 | Status: SHIPPED | OUTPATIENT
Start: 2020-11-19

## 2020-11-19 NOTE — PATIENT INSTRUCTIONS
Continue treatment as directed, if symptoms develop call for appointment.    The patient understands and agrees with assessment and plan.

## 2020-11-24 NOTE — PROGRESS NOTES
DIONY Fermin  [facility]   Respiratory Disease Clinic  [address] [place] [address]  Phone: [phone]  Fax: [phone]     Carlos Anaya is a 60 y.o. male.   CC:   Chief Complaint   Patient presents with   • Left upper lobe pulmonary nodule     No hospitalizations; no exposure; tested and negative        HPI:  Mr. Anaya is a pleasant 60 year old male patient of Dr. Jimbo Adam with known adenocarcinoma. He was noted to have a 7mm spiculated ABDOUL nodule found on screening LDCT in Feb of this year. PET scan showed some FDG avidity. He was also found to have a nasopharynx lesion with FDG avidity. He was referred to CTS and underwent a DaVinci Robot wedge resection, lobectomy, mediastinal lymph node dissection, and bronchoscopy which was found to have invasive adenocarcinoma. He also underwent a direct laryngoscopy and rigid nasal endoscopy by Dr. Rick Brown, ENT that was benign. He has been seen by both in follow up with plans for follow up with Dr. Morrow with repeat CT and follow up PRN with Dr. Rick Brown. He was also started on Flonase by Dr. Brown. He had PFTs that showed moderate obstruction. He quit smoking the day before his surgery however he was struggling and was given the nicotine patches. He is still doing the gum and lozenges.  He continues to have some discomfort on his left side when he tries to sleep on that side. He is wearing an ace wrap to the area as well.     Patient denies fever, chills, cough, shortness of breath or difficulty breathing, fatigue, muscle or body aches, new onset headache, new loss of taste or smell, sore throat, congestion or runny nose, nausea or vomiting, diarrhea.  Patient denies any known exposure to a person under investigation or positive for COVID-19.  Patient is wearing mask today.       The following portions of the patient's history were reviewed and updated as appropriate: allergies, current medications, past family history, past  "medical history, past social history, past surgical history and problem list.    Past Medical History:   Diagnosis Date   • High cholesterol    • Lung nodule    • Migraine    • Migraines        Family History   Problem Relation Age of Onset   • Lung cancer Mother    • Breast cancer Father        Social History     Socioeconomic History   • Marital status:      Spouse name: Not on file   • Number of children: Not on file   • Years of education: Not on file   • Highest education level: Not on file   Tobacco Use   • Smoking status: Former Smoker     Packs/day: 1.00     Years: 40.00     Pack years: 40.00     Types: Cigarettes     Quit date: 10/27/2020     Years since quittin.0   • Smokeless tobacco: Never Used   Substance and Sexual Activity   • Alcohol use: Not Currently   • Drug use: Never   • Sexual activity: Defer       Review of Systems   Constitutional: Negative for chills, fatigue and fever.   HENT: Negative for congestion, postnasal drip and sore throat.    Eyes: Negative for blurred vision, double vision and visual disturbance.   Respiratory: Positive for shortness of breath (improved ). Negative for cough and wheezing.    Cardiovascular: Negative for chest pain, palpitations and leg swelling.   Gastrointestinal: Negative for diarrhea, nausea and vomiting.   Endocrine: Negative for cold intolerance, heat intolerance and polydipsia.   Musculoskeletal: Negative for back pain, gait problem and myalgias.   Skin: Negative for color change, pallor and rash.   Allergic/Immunologic: Negative for environmental allergies, food allergies and immunocompromised state.   Neurological: Negative for dizziness, syncope and confusion.   Hematological: Negative for adenopathy. Does not bruise/bleed easily.   Psychiatric/Behavioral: Negative for agitation, behavioral problems and sleep disturbance.       /82   Pulse 70   Temp 99.3 °F (37.4 °C)   Ht 185.4 cm (73\")   Wt 85.7 kg (189 lb)   SpO2 99% Comment: RA  " BMI 24.94 kg/m²     Physical Exam  Constitutional:       General: He is not in acute distress.     Appearance: He is well-developed. He is not diaphoretic.   HENT:      Head: Normocephalic and atraumatic.   Eyes:      Conjunctiva/sclera: Conjunctivae normal.      Pupils: Pupils are equal, round, and reactive to light.   Neck:      Musculoskeletal: Normal range of motion and neck supple.   Cardiovascular:      Rate and Rhythm: Normal rate and regular rhythm.      Heart sounds: Normal heart sounds. No murmur. No friction rub. No gallop.    Pulmonary:      Effort: Pulmonary effort is normal.      Breath sounds: Normal breath sounds. No wheezing.   Abdominal:      General: Bowel sounds are normal.      Palpations: Abdomen is soft.   Musculoskeletal:         General: No deformity.   Lymphadenopathy:      Cervical: No cervical adenopathy.   Skin:     General: Skin is warm and dry.      Comments: Left flank incision site and puncture sites from the [place] are healing well without s/s of infection   Neurological:      Mental Status: He is oriented to person, place, and time.   Psychiatric:         Behavior: Behavior normal.         Thought Content: Thought content normal.         Judgment: Judgment normal.         Pulmonary Functions Testing Results:      Results for orders placed during the hospital encounter of 10/20/20   Full Pulmonary Function Test With Bronchodilator & ABG    [facility] PULMONARY FUNCTION TEST REPORT    Flow-volume curve:  Flow-volume curve is obstructive in configuration.    Spirometry:  FVC was 4.84 L or 92% of predicted with 0% change after bronchodilator   challenge.  FEV1 was 3.37 L  or 84% of predicted with 6% change after bronchodilator   challenge.  FVC:FEV1 ratio was 70  Spirometry values are showing obstructive pattern    Lung volumes:  TLC was 7.55 L or 100% of predicted  RV was 2.72 L or 115% of predicted    Lung volumes are within normal limit.    Diffusing  capacity:  Corrected for velar volume was 79% of predicted    Diffusing capacity adjusted for single breath is moderately reduced.    Overall impression:     Above test results acceptable and reproducible by ATS criteria  Spirometry shows moderate obstruction  No bronchodilator response  Volumes are within normal limits without any hyperinflation or significant   air trapping  Diffusion capacity is moderately decreased    There is no prior test results available for comparison.clinical   correlation is indicated.    Gerhard Anderson MD,  [facility]  10/21/2020  11:39 CDT     My interpretation: as above    CXR: none    9/2020 PET scan       Surgical pathology    Final Diagnosis  1.  Left lower lobe of lung, wedge resection:  Invasive adenocarcinoma, moderately differentiated  The tumor measures 0.9 cm in greatest dimension  See CAP synoptic report    2.  Nasopharyngeal biopsy:  Hyperkeratosis with atypia  Lymphoid hyperplasia  Negative for invasive neoplasm    3.  Level 10  bilingular artery lymph node, excision:  Negative for metastatic tumor 0/1    4.  Left upper lobe of lung (320 g), lobectomy:  Invasive adenocarcinoma involving the visceral pleural (Block 4D)  Parenchymal margin shows type II pneumocyte atypia, negative for tumor  Emphysema  5 peribronchial lymph nodes, all negative for metastatic tumor 0/5  Bronchial and vascular margins are negative    5.  Level 8 lymph node:  Negative for tumor 0/1    6.  Level 8 lymph node #2:  Negative for tumor 0/1    7.  Level 8 lymph node #3:  Negative for tumor 0/1      8.  Level 9 lymph node:  Negative for tumor 0/1    9.  Level 7 lymph node:  Negative for tumor 0/1    10.  Level 5 lymph node:  3 lymph nodes, all negative for tumor negative for tumor 0/3    11.  Level 10 interfissural lymph node:  Negative for tumor 0/1    Electronically signed by Brody Hernandez MD on 10/29/2020 at 1050        Problem List Items Addressed  This Visit        Respiratory    Adenocarcinoma of left lung (CMS/HCC) - Primary    Nasopharyngitis    Overview     S/P biopsy of nasopharyngeal area/negative for malignancy            Other Visit Diagnoses     Personal history of nicotine dependence            Patient's Body mass index is 24.94 kg/m². BMI is within normal parameters. No follow-up required..      Assessment/Plan     Overall he is doing well with some residual discomfort. He has quit smoking and continues to do the nicotine gum and lozenges. He is encouraged to keep his follow up with Dr. Morrow.     Health maintenance:   Influenza vaccine: He would like to get the Flu shot this year but will wait and discuss with Dr. Morrow on 12/14/20     Follow up: 6 months with Dr. Adam with CT         Muna Washington, APRN  11/30/2020  13:57 CST    No follow-ups on file.    This dictation was generated by voice recognition computer software. Although all attempts are made to edit dictation for accuracy, there may be errors in the transcription that are not intended.

## 2020-11-30 ENCOUNTER — TELEPHONE (OUTPATIENT)
Dept: CARDIAC SURGERY | Facility: CLINIC | Age: 60
End: 2020-11-30

## 2020-11-30 ENCOUNTER — OFFICE VISIT (OUTPATIENT)
Dept: PULMONOLOGY | Facility: CLINIC | Age: 60
End: 2020-11-30

## 2020-11-30 VITALS
HEART RATE: 70 BPM | SYSTOLIC BLOOD PRESSURE: 116 MMHG | OXYGEN SATURATION: 99 % | TEMPERATURE: 99.3 F | HEIGHT: 73 IN | WEIGHT: 189 LBS | BODY MASS INDEX: 25.05 KG/M2 | DIASTOLIC BLOOD PRESSURE: 82 MMHG

## 2020-11-30 DIAGNOSIS — J00 NASOPHARYNGITIS: ICD-10-CM

## 2020-11-30 DIAGNOSIS — C34.92 ADENOCARCINOMA OF LEFT LUNG (HCC): Primary | ICD-10-CM

## 2020-11-30 DIAGNOSIS — Z87.891 PERSONAL HISTORY OF NICOTINE DEPENDENCE: ICD-10-CM

## 2020-11-30 DIAGNOSIS — R91.1 SOLITARY PULMONARY NODULE: Primary | ICD-10-CM

## 2020-11-30 PROCEDURE — 99214 OFFICE O/P EST MOD 30 MIN: CPT | Performed by: NURSE PRACTITIONER

## 2020-11-30 RX ORDER — TRAMADOL HYDROCHLORIDE 50 MG/1
50 TABLET ORAL NIGHTLY PRN
Qty: 10 TABLET | Refills: 0 | Status: SHIPPED | OUTPATIENT
Start: 2020-11-30

## 2020-11-30 NOTE — TELEPHONE ENCOUNTER
Spoke with patient regarding pain.  He states he wears a wrap during the day and has no issues with pain.  Pain is mostly at night when trying to sleep.  He states he cannot get comfortable and lying on his side causes pain.  He also feels that the ribs are more pronounced in that area than before.  He does however report that the pain is improving.  I have advised him that I will discuss with Dr. Morrow when he is out of surgery and we will likely send in something for him to take at night.  He has follow up with Dr. Morrow in a couple of weeks.  He verbalized understanding and is agreeable.

## 2020-12-12 RX ORDER — SUMATRIPTAN 100 MG/1
TABLET, FILM COATED ORAL
Qty: 9 TABLET | Refills: 5 | Status: SHIPPED | OUTPATIENT
Start: 2020-12-12

## 2020-12-14 ENCOUNTER — OFFICE VISIT (OUTPATIENT)
Dept: CARDIAC SURGERY | Facility: CLINIC | Age: 60
End: 2020-12-14

## 2020-12-14 ENCOUNTER — HOSPITAL ENCOUNTER (OUTPATIENT)
Dept: GENERAL RADIOLOGY | Facility: HOSPITAL | Age: 60
Discharge: HOME | End: 2020-12-14
Admitting: NURSE PRACTITIONER

## 2020-12-14 VITALS
BODY MASS INDEX: 25.05 KG/M2 | DIASTOLIC BLOOD PRESSURE: 68 MMHG | HEIGHT: 73 IN | OXYGEN SATURATION: 100 % | SYSTOLIC BLOOD PRESSURE: 114 MMHG | HEART RATE: 69 BPM | WEIGHT: 189 LBS

## 2020-12-14 DIAGNOSIS — R91.1 SOLITARY PULMONARY NODULE: ICD-10-CM

## 2020-12-14 DIAGNOSIS — C34.92 ADENOCARCINOMA OF LEFT LUNG (HCC): Primary | ICD-10-CM

## 2020-12-14 PROCEDURE — 71046 X-RAY EXAM CHEST 2 VIEWS: CPT

## 2020-12-14 PROCEDURE — 99024 POSTOP FOLLOW-UP VISIT: CPT | Performed by: SURGERY

## 2020-12-14 NOTE — PROGRESS NOTES
"    [facility] Cardiothoracic Surgery  PROGRESS NOTE     Subjective:  Overall doing well.  Minor pain at site of lobe extraction with certain movements.  Sometimes has a burning pain in the dermatome associated with lobe extraction.  Breathing is improving, he is able to walk all day without any problems.    ROS: Denies fevers, chills, drainage from surgical incisions    Objective:      /68 (BP Location: Left arm, Patient Position: Sitting, Cuff Size: Adult)   Pulse 69   Ht 185.4 cm (73\")   Wt 85.7 kg (189 lb)   SpO2 100%   BMI 24.94 kg/m²       PE:  Vitals:    12/14/20 1323   BP: 114/68   Pulse: 69   SpO2: 100%     GENERAL: NAD, resting comfortably, normal color  CARDIOVASCULAR: regular, regular rate, sinus  PULMONARY: Normal bilateral breath sounds, no labored breathing, appropriately healing robotic incisions, stable ribs your incisions.  ABDOMEN: soft, nt/nd  EXTREMITIES: mild peripheral edema, normal pulses, normal ROM        Lab Results (last 72 hours)     ** No results found for the last 72 hours. **              CXR: Personally reviewed, likely atelectasis left base, good expansion of left lower lobe with improved space compared to previous x-ray.  We will plan on CT scan at follow-up with Dr. Adam and I will review this when performed.    Assessment/Plan     60-year-old male with left upper lobe lung cancer, T1 a N0 M0.  Stage Ia.  He is recovering appropriately from surgery, some minor aches and pains around the surgical incisions but nothing unexpected.  He can return to his normal activity without restriction as long as it does not cause pain.  His x-ray is consistent with a postsurgical x-ray, there is some atelectasis at the left lung base.  We will plan on CT scan for surveillance at follow-up with Dr. Adam.  I will review the CT scan when performed.  He will continue to follow with Dr. Adam for surveillance after resected stage I non-small cell lung cancer.    Thank " you for trusting me with the care of Mr. Anaya.  Please do not hesitate to call with questions or concerns.    Torrey Morrow M.D.  Cardiothoracic Surgeon

## 2020-12-22 RX ORDER — SILDENAFIL 50 MG/1
TABLET, FILM COATED ORAL
Qty: 12 TABLET | Refills: 3 | Status: SHIPPED | OUTPATIENT
Start: 2020-12-22 | End: 2022-03-28

## 2021-01-11 RX ORDER — ROSUVASTATIN CALCIUM 10 MG/1
10 TABLET, COATED ORAL DAILY
Qty: 90 TABLET | Refills: 3 | Status: SHIPPED | OUTPATIENT
Start: 2021-01-11 | End: 2022-01-20

## 2021-03-18 ENCOUNTER — OFFICE VISIT (OUTPATIENT)
Dept: NEUROLOGY | Age: 61
End: 2021-03-18
Payer: COMMERCIAL

## 2021-03-18 VITALS
SYSTOLIC BLOOD PRESSURE: 115 MMHG | DIASTOLIC BLOOD PRESSURE: 68 MMHG | WEIGHT: 198 LBS | BODY MASS INDEX: 26.24 KG/M2 | HEART RATE: 72 BPM | HEIGHT: 73 IN | RESPIRATION RATE: 18 BRPM

## 2021-03-18 DIAGNOSIS — Z79.899 ENCOUNTER FOR LONG-TERM (CURRENT) USE OF MEDICATIONS: ICD-10-CM

## 2021-03-18 DIAGNOSIS — G60.9 IDIOPATHIC PERIPHERAL NEUROPATHY: ICD-10-CM

## 2021-03-18 DIAGNOSIS — G44.021 INTRACTABLE CHRONIC CLUSTER HEADACHE: Primary | ICD-10-CM

## 2021-03-18 PROCEDURE — 99213 OFFICE O/P EST LOW 20 MIN: CPT | Performed by: PSYCHIATRY & NEUROLOGY

## 2021-03-18 PROCEDURE — 80305 DRUG TEST PRSMV DIR OPT OBS: CPT | Performed by: PSYCHIATRY & NEUROLOGY

## 2021-03-18 RX ORDER — PREDNISONE 20 MG/1
TABLET ORAL
Qty: 20 TABLET | Refills: 1 | Status: SHIPPED | OUTPATIENT
Start: 2021-03-18 | End: 2021-09-23

## 2021-03-18 NOTE — PROGRESS NOTES
[facility]  [address] [place] [address] Drive, [address] [facility] [address]  [facility], [address]  Phone [phone]  Fax [phone]     [facility] Follow Up Encounter  3/18/21 1:17 PM CDT    Information:   Patient Name: Tiarra Ortiz  :   1960  Age:   62 y.o. MRN:   [id number]  Account #:  [de-identified]  Today:  3/18/21    Provider: Osmar Mccormick M.D. Chief Complaint:   Chief Complaint   Patient presents with    6 Month Follow-Up       Subjective:   Tiarra Ortiz is a 62 y.o. man with a history of cluster headaches and peripheral neuropathy who is following up. He had a left upper lung tumor and required lobectomy 10/2020. He stopped smoking. He has clusters in the spring and the fall. They last about a month. They respond to steroids. His neuropathy symptoms are much better since changing shoes. Objective:     Past Medical History:  Past Medical History:   Diagnosis Date    BPH (benign prostatic hyperplasia)     Carotid artery stenosis     Cluster headache, intractable     Hyperlipidemia     Migraine headache     Other malaise and fatigue        Past Surgical History:   Procedure Laterality Date    BACK SURGERY      COLONOSCOPY  03/15/2013    Pamela: adenomatous and hyperplastic polyps (5 yr)    HERNIA REPAIR      SC COLONOSCOPY FLX DX W/COLLJ SPEC WHEN PFRMD N/A 2018    Dr Phani More, 5 yr recall       Recent Hospitalizations  · None    Significant Injuries  · None    Habits  Tiarra Ortiz reports that he quit smoking about 5 months ago. His smoking use included cigarettes. He has a 35.00 pack-year smoking history. He has never used smokeless tobacco. He reports that he does not drink alcohol or use drugs.     Family History   Problem Relation Age of Onset    Cancer Mother         Lung CA    Cancer Father         Breast CA    Other Sister         aneurysm    Cancer Other 58    Cancer Other 59       Social History  Petar Liu is , lives in [facility], and works at [employer]. Medications:  Current Outpatient Medications   Medication Sig Dispense Refill    predniSONE (DELTASONE) 20 MG tablet 3 PO q am for 3 days, then 2 PO q am for 3 days, then 1 PO q am for 3 days, then 1/2 PO q am for 3 days 20 tablet 1    rosuvastatin (CRESTOR) 10 MG tablet Take 1 tablet by mouth daily 90 tablet 3    sildenafil (VIAGRA) 50 MG tablet TAKE 1 TABLET BY MOUTH EVERY DAY AS NEEDED 12 tablet 3    SUMAtriptan (IMITREX) 100 MG tablet TAKE 1 TABLET BY MOUTH ONSET OF HEADACHE, MAY REPEAT IN 2 HOURS IF HEADACHE PERSISTS, MAX OF 2 TABLET IN 24 HOURS OR 4 TABLET WEEKLY 9 tablet 5    eletriptan (RELPAX) 40 MG tablet 1 PO PRN migraine. Can repeat in 2 hours. Maximum is 2 in 24 hours and 4 in a week. 12 tablet 5    Galcanezumab-gnlm (EMGALITY, 300 MG DOSE,) 100 MG/ML SOSY Inject 300 mg into the skin every 30 days 3 Syringe 5    verapamil (CALAN SR) 240 MG extended release tablet TAKE 1 TABLET BY MOUTH TWICE DAILY 180 tablet 3    butalbital-acetaminophen-caffeine (FIORICET, ESGIC) -40 MG per tablet TAKE 1 TABLET BY MOUTH TWICE DAILY AS NEEDED FOR HEADACHE 30 tablet 5    SUMAtriptan Succinate 6 MG/0.5ML SOAJ INJECT 1 SYRINGE AT ONSET OF A HEADACHE, MAY REPEAT IN 2 HOURS IF HEADACHE PERSISTS. MAX OF 2 IN 24 HOURS OR 4 WEEKLY 12 Cartridge 5    melatonin 3 MG TABS tablet Take 3 mg by mouth daily Indications: 2 PO QHS      aspirin 81 MG tablet Take 81 mg by mouth daily        No current facility-administered medications for this visit. Allergies:   Allergies as of 03/18/2021    (No Known Allergies)       Review of Systems:  Constitutional: negative for - chills and fever  Eyes:  negative for - visual disturbance and photophobia  HENMT: positive for - headaches and sinus pain  Respiratory: negative for - cough, hemoptysis, and shortness of breath  Cardiovascular: negative for - chest pain and palpitations  Gastrointestinal: negative for - blood in stools, constipation, diarrhea, nausea, and vomiting  Genito-Urinary: negative for - hematuria, urinary frequency, urinary urgency, and urinary retention  Musculoskeletal: negative for - joint pain, joint stiffness, and joint swelling  Hematological and Lymphatic: negative for - bleeding problems, abnormal bruising, and swollen lymph nodes  Endocrine:  negative for - polydipsia and polyphagia  Allergy/Immunology:  negative for - rhinorrhea, sinus congestion, hives  Integument:  negative for - negative for - rash, change in moles, new or changing lesions  Psychological: negative for - anxiety and depression  Neurological: negative for - memory loss, weakness  Positive for - numbness/tingling. PHYSICAL EXAMINATION:  Vitals:  /68   Pulse 72   Resp 18   Ht 6' 1\" (1.854 m)   Wt 198 lb (89.8 kg)   BMI 26.12 kg/m²   Vitals: There were no vitals taken for this visit. General appearance:  Alert, well developed, well nourished, in no distress  HEENT:  normocephalic, atraumatic, sclera appear normal, no nasal abnormalities, no rhinorrhea, Ears appear normal, oral mucous membranes are moist without erythema, trachea midline, thyroid is normal, no lymphadenopathy or neck mass. Cardiovascular:  Regular rate and rhythm without murmer. No peripheral edema, No cyanosis or clubbing. No carotid bruits. Pulmonary:  Lungs are clear to auscultation. Breathing appears normal, good expansion, normal effort without use of accessory muscles  Musculoskeletal:  Joints are mildly arthritic. Integument:  No rash, erythema, or pallor  Psychiatric:  Mood, affect, and behavior appear normal      NEUROLOGIC EXAMINATION:  Mental Status:  alert, oriented to person, place, and time.   Speech:  Clear without dysarthria or dysphonia  Language:  Fluent without aphasia  Cranial Nerves:              II          Visual fields are full to confrontation              III,IV, VI           Extraocular movements are full              V          Facial sensation is intact VII        Facial movements are symmetrical without weakness              VIII       Hearing is intact              IX,X     Shoulder shrug and head rotation strength are intact              XII        No tongue atrophy or fasciculations. Normal tongue protrusion. No tongue weakness  Motor:  Normal strength in both upper and lower extremities. Normal muscle tone and bulk. Deep tendon reflexes are intact and symmetrical in both upper and lower extremities. Curtis's signs are absent bilaterally. There is no ankle clonus on either side. Sensation:  Sensation is intact to light touch, temperature, and vibration in all extremities. Coordination:  Rapid alternating movements are normal in both upper and lower extremities. Finger to nose testing is unimpaired bilaterally. Gait:  Normal station and gait. Assessment:       ICD-10-CM    1. Intractable chronic cluster headache  G44.021    2. Idiopathic peripheral neuropathy  G60.9    3. Encounter for long-term (current) use of medications  Z79.899 POCT Rapid Drug Screen   Clinically stable    Plan:   1. Orders Placed This Encounter   Procedures    POCT Rapid Drug Screen     2. Discussed smoking cessation  3. Continue present medications, PRN Steroid taper.   4. FU in 6 months    Electronically signed by Marianela Lara MD on 3/18/21

## 2021-03-26 DIAGNOSIS — G44.021 INTRACTABLE CHRONIC CLUSTER HEADACHE: ICD-10-CM

## 2021-03-29 RX ORDER — BUTALBITAL, ACETAMINOPHEN AND CAFFEINE 50; 325; 40 MG/1; MG/1; MG/1
TABLET ORAL
Qty: 30 TABLET | Refills: 5 | Status: SHIPPED | OUTPATIENT
Start: 2021-03-29 | End: 2021-11-08

## 2021-03-29 NOTE — TELEPHONE ENCOUNTER
Requested Prescriptions     Pending Prescriptions Disp Refills    butalbital-acetaminophen-caffeine (FIORICET, ESGIC) -40 MG per tablet [Pharmacy Med Name: BUTALBITAL/ACETAMINOPHEN/CAFF TABS] 30 tablet      Sig: TAKE 1 TABLET BY MOUTH TWICE DAILY AS NEEDED FOR HEADACHE       Last Office Visit:  3/18/2021  Next Office Visit:  9/23/21  Last Medication Refill:  8/3/20 with 5 refills   Jj up to date: 3/29/21     *RX updated to reflect   3/29/21  fill date*

## 2021-03-31 ENCOUNTER — TELEPHONE (OUTPATIENT)
Dept: PULMONOLOGY | Facility: CLINIC | Age: 61
End: 2021-03-31

## 2021-05-14 ENCOUNTER — HOSPITAL ENCOUNTER (OUTPATIENT)
Dept: VASCULAR LAB | Age: 61
Discharge: HOME | End: 2021-05-14
Payer: COMMERCIAL

## 2021-05-14 DIAGNOSIS — I65.23 BILATERAL CAROTID ARTERY STENOSIS: Primary | ICD-10-CM

## 2021-05-14 DIAGNOSIS — I65.23 BILATERAL CAROTID ARTERY STENOSIS: ICD-10-CM

## 2021-05-14 PROCEDURE — 93880 EXTRACRANIAL BILAT STUDY: CPT

## 2021-05-18 ENCOUNTER — VIRTUAL VISIT (OUTPATIENT)
Dept: VASCULAR SURGERY | Age: 61
End: 2021-05-18
Payer: COMMERCIAL

## 2021-05-18 DIAGNOSIS — I65.23 BILATERAL CAROTID ARTERY STENOSIS: Primary | ICD-10-CM

## 2021-05-18 PROCEDURE — 99441 PR PHYS/QHP TELEPHONE EVALUATION 5-10 MIN: CPT | Performed by: NURSE PRACTITIONER

## 2021-05-18 NOTE — PROGRESS NOTES
Robert Smith is a 62 y.o. male evaluated via telephone on 2021. Robert Smith (:  1960) is a 62 y.o. male,Established patient, here for evaluation of the following chief complaint(s):     Consent:  He and/or health care decision maker is aware that that he may receive a bill for this telephone service, depending on his insurance coverage, and has provided verbal consent to proceed: Yes    Patient is located at home  Provider is located at [facility]   Also present during call is no one    He presents for follow up of carotid artery stenosis. He has a known history of carotid artery stenosis for 1 - 5 years. His current treatment includes ASA EC daily. He denies a history of CVA. He reports no TIA's, episodes of lateralizing weakness and episodes of amaurosis fugax.           Robert Smith is a 62 y.o. male with the following history reviewed and recorded in [email or web address]:  Patient Active Problem List    Diagnosis Date Noted    History of adenomatous polyp of colon 2018    Cluster headache, intractable     Intractable chronic cluster headache 2016    Migraine headache     Carotid artery stenosis     Colon cancer screening 2013    Hyperlipidemia 2013    Bilateral carotid artery stenosis 2013    BPH (benign prostatic hyperplasia) 2013    Migraines 2013    Nicotine dependence 2013     Current Outpatient Medications   Medication Sig Dispense Refill    butalbital-acetaminophen-caffeine (FIORICET, ESGIC) -40 MG per tablet TAKE 1 TABLET BY MOUTH TWICE DAILY AS NEEDED FOR HEADACHE 30 tablet 5    predniSONE (DELTASONE) 20 MG tablet 3 PO q am for 3 days, then 2 PO q am for 3 days, then 1 PO q am for 3 days, then 1/2 PO q am for 3 days 20 tablet 1    rosuvastatin (CRESTOR) 10 MG tablet Take 1 tablet by mouth daily 90 tablet 3    sildenafil (VIAGRA) 50 MG tablet TAKE 1 TABLET BY MOUTH EVERY DAY AS NEEDED 12 tablet 3    SUMAtriptan (IMITREX) 100 MG tablet TAKE 1 TABLET BY MOUTH ONSET OF HEADACHE, MAY REPEAT IN 2 HOURS IF HEADACHE PERSISTS, MAX OF 2 TABLET IN 24 HOURS OR 4 TABLET WEEKLY 9 tablet 5    eletriptan (RELPAX) 40 MG tablet 1 PO PRN migraine. Can repeat in 2 hours. Maximum is 2 in 24 hours and 4 in a week. 12 tablet 5    Galcanezumab-gnlm (EMGALITY, 300 MG DOSE,) 100 MG/ML SOSY Inject 300 mg into the skin every 30 days 3 Syringe 5    verapamil (CALAN SR) 240 MG extended release tablet TAKE 1 TABLET BY MOUTH TWICE DAILY 180 tablet 3    SUMAtriptan Succinate 6 MG/0.5ML SOAJ INJECT 1 SYRINGE AT ONSET OF A HEADACHE, MAY REPEAT IN 2 HOURS IF HEADACHE PERSISTS. MAX OF 2 IN 24 HOURS OR 4 WEEKLY 12 Cartridge 5    melatonin 3 MG TABS tablet Take 3 mg by mouth daily Indications: 2 PO QHS      aspirin 81 MG tablet Take 81 mg by mouth daily        No current facility-administered medications for this visit. Allergies: Patient has no known allergies.   Past Medical History:   Diagnosis Date    BPH (benign prostatic hyperplasia)     Carotid artery stenosis     Cluster headache, intractable     Hyperlipidemia     Malignant neoplasm of left lung (HCC)     Migraine headache     Other malaise and fatigue      Past Surgical History:   Procedure Laterality Date    BACK SURGERY      COLONOSCOPY  03/15/2013    Pamela: adenomatous and hyperplastic polyps (5 yr)    HERNIA REPAIR      LOBECTOMY Left     upper lobe    NV COLONOSCOPY FLX DX W/COLLJ SPEC WHEN PFRMD N/A 2018    Dr Marilou Zapien, 5 yr recall     Family History   Problem Relation Age of Onset    Cancer Mother         Lung CA    Cancer Father         Breast CA    Other Sister         aneurysm    Cancer Other 58    Cancer Other 59     Social History     Tobacco Use    Smoking status: Former Smoker     Packs/day: 1.00     Years: 35.00     Pack years: 35.00     Types: Cigarettes     Quit date: 10/18/2020     Years since quittin.5    Smokeless tobacco: Never Used    Tobacco comment: Patient states that he is trying to change his bad habits. Substance Use Topics    Alcohol use: No     Alcohol/week: 0.0 standard drinks       No flowsheet data found. Review of Systems      Eyes  no sudden vision change or amaurosis. Respiratory  no significant shortness of breath, wheezing, or stridor. No cough,  Cardiovascular  no chest pain, syncope, or significant dizziness. No significant leg swelling.  has not had claudication. Skin   has not had new wound. Neurologic   No speech difficulty or lateralizing weakness. Psychiatric  no severe anxiety or nervousness. No confusion. All other review of systems are negative. PHYSICAL EXAMINATION:    [ INSTRUCTIONS:  \"[x]\" Indicates a positive item  \"[]\" Indicates a negative item  -- DELETE ALL ITEMS NOT EXAMINED]    [x] Alert  [x] Oriented to person/place/time    [x] No apparent distress  [] Toxic appearing  [x] Normal Mood  [] Anxious appearing    [] Depressed appearing  [] Confused appearing      [] Poor short term memory  [] Poor long term memory   Memory appears to be intact       Doppler results:    Right CCA/ICA <50% stenotic  Left CCA/ICA <50% stenotic  Right verterbral artery flow is antegrade  Left verterbral artery flow is antegrade  Individual velocities reviewed: Yes. Results were reviewed with the patient. Disease process is stable and chronic        Assessment    1. Bilateral carotid artery stenosis          Plan    1. Bilateral carotid artery stenosis  -     VL DUP CAROTID BILATERAL; Future    12 months with cvls  Patient instructed to call or proceed to the emergency room with any symptoms of lateralizing weakness, loss of vision in one eye, or episodes slurred speech. Asa daily  Strongly encouraged start/continue statin therapy  Recommended no smoking        Documentation:  I communicated with the patient and/or health care decision maker about cvd.    Details of this discussion including any medical advice provided: as above      I affirm this is a Patient Initiated Episode with a Patient who has not had a related appointment within my department in the past 7 days or scheduled within the next 24 hours. Patient identification was verified at the start of the visit: Yes    Total Time: minutes: 5-10 minutes    The visit was conducted pursuant to the emergency declaration under the [address] [place] [address], [address] [place] [address] and the [employer]. Patient identification was verified, and a caregiver was present when appropriate. The patient was located in a state where the provider was credentialed to provide care.     Note: not billable if this call serves to triage the patient into an appointment for the relevant concern      GUILHERME Farr

## 2021-05-27 ENCOUNTER — HOSPITAL ENCOUNTER (OUTPATIENT)
Dept: CT IMAGING | Facility: HOSPITAL | Age: 61
Discharge: HOME | End: 2021-05-27
Admitting: NURSE PRACTITIONER

## 2021-05-27 DIAGNOSIS — C34.92 ADENOCARCINOMA OF LEFT LUNG (HCC): ICD-10-CM

## 2021-05-27 LAB — CREAT BLDA-MCNC: 1.1 MG/DL (ref 0.6–1.3)

## 2021-05-27 PROCEDURE — 25010000002 IOPAMIDOL 61 % SOLUTION: Performed by: NURSE PRACTITIONER

## 2021-05-27 PROCEDURE — 71260 CT THORAX DX C+: CPT

## 2021-05-27 PROCEDURE — 82565 ASSAY OF CREATININE: CPT

## 2021-05-27 RX ADMIN — IOPAMIDOL 100 ML: 612 INJECTION, SOLUTION INTRAVENOUS at 15:17

## 2021-06-01 ENCOUNTER — OFFICE VISIT (OUTPATIENT)
Dept: PULMONOLOGY | Facility: CLINIC | Age: 61
End: 2021-06-01

## 2021-06-01 VITALS
DIASTOLIC BLOOD PRESSURE: 70 MMHG | SYSTOLIC BLOOD PRESSURE: 122 MMHG | HEART RATE: 59 BPM | OXYGEN SATURATION: 99 % | HEIGHT: 73 IN | WEIGHT: 199 LBS | BODY MASS INDEX: 26.37 KG/M2

## 2021-06-01 DIAGNOSIS — R06.02 SHORTNESS OF BREATH: ICD-10-CM

## 2021-06-01 DIAGNOSIS — C34.92 ADENOCARCINOMA OF LEFT LUNG (HCC): Primary | ICD-10-CM

## 2021-06-01 DIAGNOSIS — Z87.891 PERSONAL HISTORY OF NICOTINE DEPENDENCE: ICD-10-CM

## 2021-06-01 DIAGNOSIS — R91.8 MULTIPLE LUNG NODULES: ICD-10-CM

## 2021-06-01 DIAGNOSIS — R91.1 LEFT UPPER LOBE PULMONARY NODULE: ICD-10-CM

## 2021-06-01 DIAGNOSIS — J43.2 CENTRILOBULAR EMPHYSEMA (HCC): ICD-10-CM

## 2021-06-01 PROCEDURE — 99214 OFFICE O/P EST MOD 30 MIN: CPT | Performed by: INTERNAL MEDICINE

## 2021-08-08 DIAGNOSIS — G44.021 INTRACTABLE CHRONIC CLUSTER HEADACHE: ICD-10-CM

## 2021-08-09 RX ORDER — VERAPAMIL HYDROCHLORIDE 240 MG/1
TABLET, FILM COATED, EXTENDED RELEASE ORAL
Qty: 180 TABLET | Refills: 3 | Status: SHIPPED | OUTPATIENT
Start: 2021-08-09 | End: 2022-07-22

## 2021-08-13 ENCOUNTER — OFFICE VISIT (OUTPATIENT)
Dept: PRIMARY CARE CLINIC | Age: 61
End: 2021-08-13
Payer: COMMERCIAL

## 2021-08-13 VITALS
BODY MASS INDEX: 26.51 KG/M2 | HEIGHT: 73 IN | DIASTOLIC BLOOD PRESSURE: 80 MMHG | TEMPERATURE: 96.9 F | SYSTOLIC BLOOD PRESSURE: 110 MMHG | HEART RATE: 67 BPM | WEIGHT: 200 LBS | OXYGEN SATURATION: 97 %

## 2021-08-13 DIAGNOSIS — Z13.1 SCREENING FOR DIABETES MELLITUS: ICD-10-CM

## 2021-08-13 DIAGNOSIS — G44.021 INTRACTABLE CHRONIC CLUSTER HEADACHE: ICD-10-CM

## 2021-08-13 DIAGNOSIS — E78.2 MIXED HYPERLIPIDEMIA: ICD-10-CM

## 2021-08-13 DIAGNOSIS — Z12.5 SCREENING FOR PROSTATE CANCER: ICD-10-CM

## 2021-08-13 DIAGNOSIS — Z00.00 WELL ADULT EXAM: Primary | ICD-10-CM

## 2021-08-13 DIAGNOSIS — Z13.220 ENCOUNTER FOR SCREENING FOR LIPID DISORDER: ICD-10-CM

## 2021-08-13 DIAGNOSIS — C34.32 MALIGNANT NEOPLASM OF LOWER LOBE OF LEFT LUNG (HCC): ICD-10-CM

## 2021-08-13 PROCEDURE — 99396 PREV VISIT EST AGE 40-64: CPT | Performed by: NURSE PRACTITIONER

## 2021-08-13 RX ORDER — METHOCARBAMOL 500 MG/1
500 TABLET, FILM COATED ORAL EVERY 8 HOURS PRN
COMMUNITY
Start: 2020-10-30

## 2021-08-13 RX ORDER — TRAMADOL HYDROCHLORIDE 50 MG/1
50 TABLET ORAL NIGHTLY PRN
COMMUNITY
Start: 2020-11-30

## 2021-08-13 ASSESSMENT — PATIENT HEALTH QUESTIONNAIRE - PHQ9
SUM OF ALL RESPONSES TO PHQ QUESTIONS 1-9: 0
SUM OF ALL RESPONSES TO PHQ9 QUESTIONS 1 & 2: 0
2. FEELING DOWN, DEPRESSED OR HOPELESS: 0
1. LITTLE INTEREST OR PLEASURE IN DOING THINGS: 0
SUM OF ALL RESPONSES TO PHQ QUESTIONS 1-9: 0
SUM OF ALL RESPONSES TO PHQ QUESTIONS 1-9: 0

## 2021-08-13 ASSESSMENT — ENCOUNTER SYMPTOMS
GASTROINTESTINAL NEGATIVE: 1
RESPIRATORY NEGATIVE: 1
EYES NEGATIVE: 1
ALLERGIC/IMMUNOLOGIC NEGATIVE: 1

## 2021-08-13 NOTE — PROGRESS NOTES
[address] [facility]  [address] [place] [address]  [address] [place] [address]  Dept: [phone]  Dept Fax: [phone]  Loc: [phone]    Brenda Singer is a 62 y.o. male who presents today for his medical conditions/complaints as noted below. Brenda Singer is c/o of Annual Exam (Nonfasting. Patient presents today doing well.  )        HPI:     HPI   Chief Complaint   Patient presents with    Annual Exam     Nonfasting. Patient presents today doing well. He underwent a surgery for neoplasm in his lung last fall he has recovered well he has been followed by pulmonologist here in [place]. He sees a vascular for his carotids for surveillance and he also sees Dr. Eve Hein for chronic migraines. He is doing very well. He has a pain in his left side near the incision from his lung surgery. It doesn't bother him all the time it occasionally bothers him. He has normal bowel movements he is not having any problems with that.   He is not fasting today  Past Medical History:   Diagnosis Date    BPH (benign prostatic hyperplasia)     Carotid artery stenosis     Cluster headache, intractable     Hyperlipidemia     Malignant neoplasm of left lung (HCC)     Malignant neoplasm of left lung (HCC)     Migraine headache     Other malaise and fatigue       Past Surgical History:   Procedure Laterality Date    BACK SURGERY      COLONOSCOPY  03/15/2013    Pamela: adenomatous and hyperplastic polyps (5 yr)    HERNIA REPAIR      LOBECTOMY Left     upper lobe    OR COLONOSCOPY FLX DX W/COLLJ SPEC WHEN PFRMD N/A 04/20/2018    Dr Zeb Dalton, 5 yr recall       Vitals 8/13/2021 3/18/2021 9/17/2020 8/6/2020 3/12/2020 5/33/9175   SYSTOLIC 788 033 069 180 105 655   DIASTOLIC 80 68 68 60 63 63   Pulse 67 72 61 58 66 69   Temp 96.9 - - 98.2 - -   Resp - 18 18 16 16 14   SpO2 97 - - 98 - -   Weight 200 lb 198 lb 198 lb 195 lb 199 lb 9.6 oz 194 lb 12.8 oz   Height 6' 1\" 6' 1\" 6' 1\" 6' 1\" 6' 1\" 6' 1\" Body mass index 26.38 kg/m2 26.12 kg/m2 26.12 kg/m2 25.72 kg/m2 26.33 kg/m2 25.7 kg/m2   Pain Level - - - - - -   Some recent data might be hidden       Family History   Problem Relation Age of Onset    Cancer Mother         Lung CA    Cancer Father         Breast CA    Other Sister         aneurysm    Cancer Other 58    Cancer Other 59       Social History     Tobacco Use    Smoking status: Former Smoker     Packs/day: 1.00     Years: 35.00     Pack years: 35.00     Types: Cigarettes     Quit date: 10/18/2020     Years since quittin.8    Smokeless tobacco: Never Used    Tobacco comment: Patient states that he is trying to change his bad habits. Substance Use Topics    Alcohol use: No     Alcohol/week: 0.0 standard drinks      Current Outpatient Medications on File Prior to Visit   Medication Sig Dispense Refill    methocarbamol (ROBAXIN) 500 MG tablet Take 500 mg by mouth every 8 hours as needed      traMADol (ULTRAM) 50 MG tablet Take 50 mg by mouth nightly as needed.  verapamil (CALAN SR) 240 MG extended release tablet TAKE 1 TABLET BY MOUTH TWICE DAILY 180 tablet 3    butalbital-acetaminophen-caffeine (FIORICET, ESGIC) -40 MG per tablet TAKE 1 TABLET BY MOUTH TWICE DAILY AS NEEDED FOR HEADACHE 30 tablet 5    rosuvastatin (CRESTOR) 10 MG tablet Take 1 tablet by mouth daily 90 tablet 3    sildenafil (VIAGRA) 50 MG tablet TAKE 1 TABLET BY MOUTH EVERY DAY AS NEEDED 12 tablet 3    SUMAtriptan (IMITREX) 100 MG tablet TAKE 1 TABLET BY MOUTH ONSET OF HEADACHE, MAY REPEAT IN 2 HOURS IF HEADACHE PERSISTS, MAX OF 2 TABLET IN 24 HOURS OR 4 TABLET WEEKLY 9 tablet 5    eletriptan (RELPAX) 40 MG tablet 1 PO PRN migraine. Can repeat in 2 hours. Maximum is 2 in 24 hours and 4 in a week.  12 tablet 5    Galcanezumab-gnlm (EMGALITY, 300 MG DOSE,) 100 MG/ML SOSY Inject 300 mg into the skin every 30 days 3 Syringe 5    SUMAtriptan Succinate 6 MG/0.5ML SOAJ INJECT 1 SYRINGE AT ONSET OF A HEADACHE, MAY REPEAT IN 2 HOURS IF HEADACHE PERSISTS. MAX OF 2 IN 24 HOURS OR 4 WEEKLY 12 Cartridge 5    melatonin 3 MG TABS tablet Take 3 mg by mouth daily Indications: 2 PO QHS      aspirin 81 MG tablet Take 81 mg by mouth daily       predniSONE (DELTASONE) 20 MG tablet 3 PO q am for 3 days, then 2 PO q am for 3 days, then 1 PO q am for 3 days, then 1/2 PO q am for 3 days (Patient not taking: Reported on 8/13/2021) 20 tablet 1     No current facility-administered medications on file prior to visit. No Known Allergies    Health Maintenance   Topic Date Due    Hepatitis C screen  Never done    HIV screen  Never done    Shingles Vaccine (1 of 2) Never done    Lipid screen  08/14/2021    DTaP/Tdap/Td vaccine (1 - Tdap) 02/11/2026 (Originally 6/28/2016)    Flu vaccine (1) 09/01/2021    Low dose CT lung screening  05/27/2022    Colon cancer screen colonoscopy  04/20/2023    COVID-19 Vaccine  Completed    Hepatitis A vaccine  Aged Out    Hepatitis B vaccine  Aged Out    Hib vaccine  Aged Out    Meningococcal (ACWY) vaccine  Aged Out    Pneumococcal 0-64 years Vaccine  Aged Out       Subjective:      Review of Systems   Constitutional: Negative. HENT: Negative. Eyes: Negative. Respiratory: Negative. Cardiovascular: Negative. Gastrointestinal: Negative. Endocrine: Negative. Genitourinary: Negative. Musculoskeletal: Negative. Allergic/Immunologic: Negative. Neurological: Negative. Hematological: Negative. Psychiatric/Behavioral: Negative. Objective:     Physical Exam  Vitals and nursing note reviewed. Constitutional:       Appearance: Normal appearance. He is well-developed. HENT:      Head: Normocephalic.       Right Ear: Tympanic membrane and external ear normal.      Left Ear: Tympanic membrane and external ear normal.      Ears:      Comments: Hearing aids bilateral     Nose: Nose normal.   Eyes:      Pupils: Pupils are equal, round, and reactive to light. Cardiovascular:      Rate and Rhythm: Normal rate and regular rhythm. Pulses: Normal pulses. Heart sounds: Normal heart sounds. Pulmonary:      Effort: Pulmonary effort is normal.      Breath sounds: Normal breath sounds. Abdominal:      General: Abdomen is flat. Bowel sounds are normal.      Tenderness: There is no abdominal tenderness. Hernia: A hernia (small) is present. Hernia is present in the umbilical area. Genitourinary:     Comments: declined  exam  Musculoskeletal:         General: Normal range of motion. Cervical back: Normal range of motion. Skin:     General: Skin is warm and dry. Capillary Refill: Capillary refill takes less than 2 seconds. Neurological:      General: No focal deficit present. Mental Status: He is alert and oriented to person, place, and time. Psychiatric:         Mood and Affect: Mood normal.         Behavior: Behavior normal.         Thought Content: Thought content normal.         Judgment: Judgment normal.       /80   Pulse 67   Temp 96.9 °F (36.1 °C)   Ht 6' 1\" (1.854 m)   Wt 200 lb (90.7 kg)   SpO2 97%   BMI 26.39 kg/m²     Assessment:       Diagnosis Orders   1. Well adult exam  PSA screening    Lipid Panel    Comprehensive Metabolic Panel    CBC   2. Mixed hyperlipidemia  Lipid Panel   3. Intractable chronic cluster headache     4. Screening for prostate cancer  PSA screening   5. Encounter for screening for lipid disorder  Lipid Panel   6. Screening for diabetes mellitus  Comprehensive Metabolic Panel   7. Malignant neoplasm of lower lobe of left lung ([address]  CBC         Plan: It is possible that it is scar tissue pain that he is feeling near the incision. He is not having any problems with bowel movements. Were just going to watch it. He does have a small umbilical hernia, its not significant.   He does have a male family history of breast cancer and I discussed with him the BRCA gene but he does not want to get it. He did have a PET scan last year that just showed the lung cancer only. Patient given educational materials -see patient instructions. Discussed use, benefit, and side effects of prescribed medications. All patient questions answered. Pt voiced understanding. Reviewed health maintenance. Instructed to continue currentmedications, diet and exercise. Patient agreed with treatment plan. Follow up as directed. MEDICATIONS:  No orders of the defined types were placed in this encounter. ORDERS:  Orders Placed This Encounter   Procedures    PSA screening    Lipid Panel    Comprehensive Metabolic Panel    CBC       Follow-up:  Return for fasting labs 1-2 weeks, 1 yr on PE. PATIENT INSTRUCTIONS:  There are no Patient Instructions on file for this visit. Electronically signed by GUILHERME Schaeffer CNP on 8/13/2021 at 8:38 AM    EMR Dragon/transcription disclaimer:  Much of thisencounter note is electronic transcription/translation of spoken language to printed texts. The electronic translation of spoken language may be erroneous, or at times, nonsensical words or phrases may be inadvertentlytranscribed.   Although I have reviewed the note for such errors, some may still exist.

## 2021-08-26 ENCOUNTER — TRANSCRIBE ORDERS (OUTPATIENT)
Dept: LAB | Facility: HOSPITAL | Age: 61
End: 2021-08-26

## 2021-08-26 DIAGNOSIS — Z01.818 PREOPERATIVE TESTING: Primary | ICD-10-CM

## 2021-08-27 ENCOUNTER — HOSPITAL ENCOUNTER (OUTPATIENT)
Dept: CT IMAGING | Facility: HOSPITAL | Age: 61
Discharge: HOME | End: 2021-08-27
Admitting: INTERNAL MEDICINE

## 2021-08-27 DIAGNOSIS — R91.8 MULTIPLE LUNG NODULES: ICD-10-CM

## 2021-08-27 PROCEDURE — 71250 CT THORAX DX C-: CPT

## 2021-08-29 NOTE — PROGRESS NOTES
Let pt know this looked ok.  Everything same or better.  Nothing else to do for now.  Keep follow up as planned

## 2021-08-30 ENCOUNTER — LAB (OUTPATIENT)
Dept: LAB | Facility: HOSPITAL | Age: 61
End: 2021-08-30

## 2021-08-30 LAB — SARS-COV-2 ORF1AB RESP QL NAA+PROBE: NOT DETECTED

## 2021-08-30 PROCEDURE — C9803 HOPD COVID-19 SPEC COLLECT: HCPCS | Performed by: NURSE PRACTITIONER

## 2021-08-30 PROCEDURE — U0004 COV-19 TEST NON-CDC HGH THRU: HCPCS | Performed by: NURSE PRACTITIONER

## 2021-09-02 ENCOUNTER — PROCEDURE VISIT (OUTPATIENT)
Dept: PULMONOLOGY | Facility: CLINIC | Age: 61
End: 2021-09-02

## 2021-09-02 DIAGNOSIS — J43.2 CENTRILOBULAR EMPHYSEMA (HCC): Primary | ICD-10-CM

## 2021-09-02 PROCEDURE — 94010 BREATHING CAPACITY TEST: CPT | Performed by: NURSE PRACTITIONER

## 2021-09-02 PROCEDURE — 94729 DIFFUSING CAPACITY: CPT | Performed by: NURSE PRACTITIONER

## 2021-09-02 NOTE — PROCEDURES
Pulmonary Function Test  Performed by: Ankita Bowen, RRT  Authorized by: Muna Washington APRN      Pre Drug % Predicted    FVC: 93%   FEV1: 80%   FEF 25-75%: 46%   FEV1/FVC: 68.56%   DLCO: 84%   D/VAsb: 101%    Interpretation   Spirometry   Spirometry shows mild obstruction.   Diffusion Capacity  The patient's diffusion capacity is normal.  Diffusion capacity is normal when corrected for alveolar volume.

## 2021-09-03 NOTE — PROGRESS NOTES
"Chief Complaint  Adenocarcinoma of the left lung         Subjective       History of Present Illness      Mr. Anaya is a pleasant 61 year old male patient of Dr. Jimbo Adam with known Adenocarcinoma of the left lung post left upper lobectomy by Dr. Morrow in Oct 2020. He was identified with a 7 mm spiculated nodule in the ABDOUL in Feb 2020. He reported at his last visit with Dr. Adam that he was coughing when he bent over which has since resolved. He quit smoking in Oct 2020. He reported to Dr. Adam that he gets out of breath with hauling 30 talita of hay. This has not changed. He does state last year prior to his diagnosis and surgery he would be able to do 120 talita. He states his PCP gave him a rescue inhaler last year but he has not had to use it. He does not feel he needs to. He has a \"weird\" feeling on the left side but denies any pain. He was noted to have emphysematous changes on last CT. He has lung nodules and his Pet scan from last year noted no unusual uptake in any of these. Dr. Adam asked he have a repeat CT in 3 months. That was done on Aug 27th and nodules were stable. He had PFTs Sept 2nd showing mild obstruction, small airway disease and normal diffusion capacity.            Objective      Vital Signs:   Vitals:    09/20/21 1559   BP: 116/60   Pulse: 56   SpO2: 98%   Weight: 91.6 kg (202 lb)   Height: 185.4 cm (73\")       Physical Exam  Vitals reviewed.   Constitutional:       Appearance: Normal appearance.   Cardiovascular:      Rate and Rhythm: Normal rate and regular rhythm.   Pulmonary:      Effort: Pulmonary effort is normal.      Breath sounds: Normal breath sounds.   Neurological:      General: No focal deficit present.      Mental Status: He is alert and oriented to person, place, and time.   Psychiatric:         Mood and Affect: Mood normal.         Behavior: Behavior normal.          Result Review    :  The following data was reviewed by: DIONY Fermin on 09/20/2021:    Data " reviewed: Radiologic studies CT chest August    My interpretation of imaging:  Stable   My interpretation of labs: none    CT Chest Without Contrast Diagnostic (08/27/2021 10:48)    My interpretation of the PFT: Mild obstruction, small airway disease, normal diffusion capacity      Results for orders placed during the hospital encounter of 10/20/20     Full Pulmonary Function Test With Bronchodilator & ABG     [facility]  [facility] PULMONARY FUNCTION TEST REPORT     Flow-volume curve:  Flow-volume curve is obstructive in configuration.     Spirometry:  FVC was 4.84 L or 92% of predicted with 0% change after bronchodilator challenge.  FEV1 was 3.37 L  or 84% of predicted with 6% change after bronchodilator challenge.  FVC:FEV1 ratio was 70  Spirometry values are showing obstructive pattern     Lung volumes:  TLC was 7.55 L or 100% of predicted  RV was 2.72 L or 115% of predicted     Lung volumes are within normal limit.     Diffusing capacity:  Corrected for velar volume was 79% of predicted     Diffusing capacity adjusted for single breath is moderately reduced.     Overall impression:     Above test results acceptable and reproducible by ATS criteria  Spirometry shows moderate obstruction  No bronchodilator response  Volumes are within normal limits without any hyperinflation or significant air trapping  Diffusion capacity is moderately decreased     There is no prior test results available for comparison.clinical correlation is indicated.     Gerhard Anderson MD,  [facility]  10/21/2020  11:39 CDT     Patient's BMI 26.65. BMI is within normal parameters. No follow-up required..     Assessment and Plan   Diagnoses and all orders for this visit:      Diagnosis Plan   1. Multiple lung nodules     2. Adenocarcinoma of left lung (CMS/HCC)     3. Former smoker     4. Bullous emphysema (CMS/HCC)             He is doing well overall. We have reviewed his CT  results and PFTs. He does not feel he needs inhalers at this time and has his rescue inhaler if needed. He will notify us should his symptoms progress or worsen. His lung nodules are stable. Will plan a yearly CT.      Alpha 1: none     Health maintenance:   Influenza vaccine:none  Pneumovax 23: June 2016  Prevnar 13:none   Covid-19 Moderna March/ March 2021      Follow Up   No follow-ups on file.  Patient was given instructions and counseling regarding his condition or for health maintenance advice. Please see specific information pulled into the AVS if appropriate.      Muna Washington, APRN  09/20/2021 at 1720

## 2021-09-20 ENCOUNTER — OFFICE VISIT (OUTPATIENT)
Dept: PULMONOLOGY | Facility: CLINIC | Age: 61
End: 2021-09-20

## 2021-09-20 VITALS
WEIGHT: 202 LBS | BODY MASS INDEX: 26.77 KG/M2 | HEIGHT: 73 IN | OXYGEN SATURATION: 98 % | SYSTOLIC BLOOD PRESSURE: 116 MMHG | HEART RATE: 56 BPM | DIASTOLIC BLOOD PRESSURE: 60 MMHG

## 2021-09-20 DIAGNOSIS — J43.9 BULLOUS EMPHYSEMA (HCC): ICD-10-CM

## 2021-09-20 DIAGNOSIS — R91.8 MULTIPLE LUNG NODULES: Primary | ICD-10-CM

## 2021-09-20 DIAGNOSIS — C34.92 ADENOCARCINOMA OF LEFT LUNG (HCC): ICD-10-CM

## 2021-09-20 DIAGNOSIS — Z87.891 FORMER SMOKER: ICD-10-CM

## 2021-09-20 PROCEDURE — 99214 OFFICE O/P EST MOD 30 MIN: CPT | Performed by: NURSE PRACTITIONER

## 2021-09-23 ENCOUNTER — OFFICE VISIT (OUTPATIENT)
Dept: NEUROLOGY | Age: 61
End: 2021-09-23
Payer: COMMERCIAL

## 2021-09-23 VITALS
DIASTOLIC BLOOD PRESSURE: 72 MMHG | BODY MASS INDEX: 26.51 KG/M2 | HEART RATE: 65 BPM | SYSTOLIC BLOOD PRESSURE: 114 MMHG | WEIGHT: 200 LBS | HEIGHT: 73 IN

## 2021-09-23 DIAGNOSIS — G44.021 INTRACTABLE CHRONIC CLUSTER HEADACHE: Primary | ICD-10-CM

## 2021-09-23 DIAGNOSIS — G60.9 IDIOPATHIC PERIPHERAL NEUROPATHY: ICD-10-CM

## 2021-09-23 PROCEDURE — 99213 OFFICE O/P EST LOW 20 MIN: CPT | Performed by: PSYCHIATRY & NEUROLOGY

## 2021-09-23 NOTE — PROGRESS NOTES
[facility]  [address] [place] [address] Drive, [address] [facility] [address]  Britt Gill  Phone [phone]  Fax [phone]     [facility] Follow Up Encounter  21 11:43 AM CDT    Information:   Patient Name: Torrey Keys  :   1960  Age:   62 y.o. MRN:   [id number]  Account #:  [de-identified]  Today:  21    Provider: Gaby Zamora M.D. Chief Complaint:   Chief Complaint   Patient presents with    Headache     follow up        Subjective:   Torrey Keys is a 62 y.o. man with a history of cluster headaches and peripheral neuropathy who is following up. He has been doing very well since stopping smoking. He usually has a cluster of headaches every spring and every . He has not had any cluster and has had few sporadic cluster headaches. He had some numbness and tingling in his toes but that improved when changed his boots. Objective:     Past Medical History:  Past Medical History:   Diagnosis Date    BPH (benign prostatic hyperplasia)     Carotid artery stenosis     Cluster headache, intractable     Hyperlipidemia     Malignant neoplasm of left lung (HCC)     Malignant neoplasm of left lung (HCC)     Migraine headache     Other malaise and fatigue        Past Surgical History:   Procedure Laterality Date    BACK SURGERY      COLONOSCOPY  03/15/2013    Pamela: adenomatous and hyperplastic polyps (5 yr)    HERNIA REPAIR      LOBECTOMY Left     upper lobe    [place] COLONOSCOPY FLX DX W/COLLJ SPEC WHEN PFRMD N/A 2018    Dr Lesley Nguyen, 5 yr recall       Recent Hospitalizations  · None    Significant Injuries  · None    Habits  Torrey Keys reports that he quit smoking about 11 months ago. His smoking use included cigarettes. He has a 35.00 pack-year smoking history. He has never used smokeless tobacco. He reports that he does not drink alcohol and does not use drugs.     Family History   Problem Relation Age of Onset    Cancer Mother         Lung CA    Cancer Father Breast CA    Other Sister         aneurysm    Cancer Other 58    Cancer Other 59       Social History  Petar Liu is , lives in [facility] [place], and works at [employer]. Medications:  Current Outpatient Medications   Medication Sig Dispense Refill    methocarbamol (ROBAXIN) 500 MG tablet Take 500 mg by mouth every 8 hours as needed      traMADol (ULTRAM) 50 MG tablet Take 50 mg by mouth nightly as needed.  verapamil (CALAN SR) 240 MG extended release tablet TAKE 1 TABLET BY MOUTH TWICE DAILY 180 tablet 3    butalbital-acetaminophen-caffeine (FIORICET, ESGIC) -40 MG per tablet TAKE 1 TABLET BY MOUTH TWICE DAILY AS NEEDED FOR HEADACHE 30 tablet 5    rosuvastatin (CRESTOR) 10 MG tablet Take 1 tablet by mouth daily 90 tablet 3    sildenafil (VIAGRA) 50 MG tablet TAKE 1 TABLET BY MOUTH EVERY DAY AS NEEDED 12 tablet 3    SUMAtriptan (IMITREX) 100 MG tablet TAKE 1 TABLET BY MOUTH ONSET OF HEADACHE, MAY REPEAT IN 2 HOURS IF HEADACHE PERSISTS, MAX OF 2 TABLET IN 24 HOURS OR 4 TABLET WEEKLY 9 tablet 5    eletriptan (RELPAX) 40 MG tablet 1 PO PRN migraine. Can repeat in 2 hours. Maximum is 2 in 24 hours and 4 in a week. 12 tablet 5    Galcanezumab-gnlm (EMGALITY, 300 MG DOSE,) 100 MG/ML SOSY Inject 300 mg into the skin every 30 days 3 Syringe 5    SUMAtriptan Succinate 6 MG/0.5ML SOAJ INJECT 1 SYRINGE AT ONSET OF A HEADACHE, MAY REPEAT IN 2 HOURS IF HEADACHE PERSISTS. MAX OF 2 IN 24 HOURS OR 4 WEEKLY 12 Cartridge 5    melatonin 3 MG TABS tablet Take 3 mg by mouth daily Indications: 2 PO QHS      aspirin 81 MG tablet Take 81 mg by mouth daily        No current facility-administered medications for this visit. Allergies:   Allergies as of 09/23/2021    (No Known Allergies)       Review of Systems:  Constitutional: negative for - chills and fever  Eyes:  negative for - visual disturbance and photophobia  HENMT: positive for - headaches and sinus pain  Respiratory: negative for - cough, hemoptysis, and shortness of breath  Cardiovascular: negative for - chest pain and palpitations  Gastrointestinal: negative for - blood in stools, constipation, diarrhea, nausea, and vomiting  Genito-Urinary: negative for - hematuria, urinary frequency, urinary urgency, and urinary retention  Musculoskeletal: negative for - joint pain, joint stiffness, and joint swelling  Hematological and Lymphatic: negative for - bleeding problems, abnormal bruising, and swollen lymph nodes  Endocrine:  negative for - polydipsia and polyphagia  Allergy/Immunology:  negative for - rhinorrhea, sinus congestion, hives  Integument:  negative for - negative for - rash, change in moles, new or changing lesions  Psychological: negative for - anxiety and depression  Neurological: negative for - memory loss, numbness/tingling, and weakness     PHYSICAL EXAMINATION:  Vitals:  /72   Pulse 65   Ht 6' 1\" (1.854 m)   Wt 200 lb (90.7 kg)   BMI 26.39 kg/m²   General appearance:  Alert, well developed, well nourished, in no distress  HEENT:  normocephalic, atraumatic, sclera appear normal, no nasal abnormalities, no rhinorrhea, Ears appear normal, oral mucous membranes are moist without erythema, trachea midline, thyroid is normal, no lymphadenopathy or neck mass. Cardiovascular:  Regular rate and rhythm without murmer. No peripheral edema, No cyanosis or clubbing. No carotid bruits. Pulmonary:  Lungs are clear to auscultation. Breathing appears normal, good expansion, normal effort without use of accessory muscles  Musculoskeletal:  Joints are mildly arthritic. Integument:  No rash, erythema, or pallor  Psychiatric:  Mood, affect, and behavior appear normal      NEUROLOGIC EXAMINATION:  Mental Status:  alert, oriented to person, place, and time.   Speech:  Clear without dysarthria or dysphonia  Language:  Fluent without aphasia  Cranial Nerves:   II Visual fields are full to confrontation   III,IV, VI Extraocular movements are full   VII Facial movements are symmetrical without weakness   VIII Hearing is intact   IX,X Shoulder shrug and head rotation strength are intact   XII No tongue atrophy or fasciculations. Normal tongue protrusion. No tongue weakness  Motor:  Normal strength in both upper and lower extremities. Normal muscle tone and bulk. Deep tendon reflexes are intact and symmetrical in both upper and lower extremities. Curtis's signs are absent bilaterally. There is no ankle clonus on either side. Sensation:  Sensation is intact to light touch, temperature, and vibration in all extremities. Coordination:  Rapid alternating movements are normal in both upper and lower extremities. Finger to nose testing is unimpaired bilaterally. Gait:  Normal station and gait. Pertinent Diagnostic Studies:  Hospital notes, imaging, labs    Assessment:       ICD-10-CM    1. Intractable chronic cluster headache  G44.021    2. Idiopathic peripheral neuropathy  G60.9    Clinically improved. Plan:   1. Continue present medications  2.  FU in 6 months    Electronically signed by Lucy Bernard MD on 9/23/21

## 2021-11-07 DIAGNOSIS — G44.021 INTRACTABLE CHRONIC CLUSTER HEADACHE: ICD-10-CM

## 2021-11-08 RX ORDER — BUTALBITAL, ACETAMINOPHEN AND CAFFEINE 50; 325; 40 MG/1; MG/1; MG/1
TABLET ORAL
Qty: 30 TABLET | Refills: 5 | Status: SHIPPED | OUTPATIENT
Start: 2021-11-08 | End: 2022-05-24

## 2021-11-08 NOTE — TELEPHONE ENCOUNTER
Kirby Evans has requested a refill on his medication.       Last office visit : 9/23/2021   Next office visit : Visit date not found   Last medication refill : 8/29/21  Kaykay Pedraza : up to date       Requested Prescriptions     Pending Prescriptions Disp Refills    butalbital-acetaminophen-caffeine (FIORICET, ESGIC) -40 MG per tablet [Pharmacy Med Name: BUTALBITAL/ACETAMINOPHEN/CAFF TABS] 30 tablet      Sig: TAKE 1 TABLET BY MOUTH TWICE DAILY AS NEEDED FOR HEADACHE

## 2022-01-20 RX ORDER — ROSUVASTATIN CALCIUM 10 MG/1
10 TABLET, COATED ORAL DAILY
Qty: 90 TABLET | Refills: 3 | Status: SHIPPED | OUTPATIENT
Start: 2022-01-20

## 2022-03-28 RX ORDER — SILDENAFIL 50 MG/1
TABLET, FILM COATED ORAL
Qty: 12 TABLET | Refills: 3 | Status: SHIPPED | OUTPATIENT
Start: 2022-03-28

## 2022-05-21 DIAGNOSIS — G44.021 INTRACTABLE CHRONIC CLUSTER HEADACHE: ICD-10-CM

## 2022-05-23 NOTE — TELEPHONE ENCOUNTER
Requested Prescriptions     Pending Prescriptions Disp Refills    butalbital-acetaminophen-caffeine (FIORICET, ESGIC) -40 MG per tablet [Pharmacy Med Name: BUTALBITAL/ACETAMINOPHEN/CAFF TABS] 30 tablet      Sig: TAKE 1 TABLET BY MOUTH TWICE DAILY AS NEEDED FOR HEADACHE       Last Office Visit:  9/23/2021  Next Office Visit:  9/26/2022  Last Medication Refill: 11/8/2021 with 5 RF    Cici Cervantes up to date:  5/23/2022    *RX updated to reflect   5/23/2022  fill date*

## 2022-05-24 RX ORDER — BUTALBITAL, ACETAMINOPHEN AND CAFFEINE 50; 325; 40 MG/1; MG/1; MG/1
TABLET ORAL
Qty: 30 TABLET | Refills: 3 | Status: SHIPPED | OUTPATIENT
Start: 2022-05-24

## 2022-05-26 ENCOUNTER — HOSPITAL ENCOUNTER (OUTPATIENT)
Dept: VASCULAR LAB | Age: 62
Discharge: HOME | End: 2022-05-26
Payer: COMMERCIAL

## 2022-05-26 ENCOUNTER — OFFICE VISIT (OUTPATIENT)
Dept: VASCULAR SURGERY | Age: 62
End: 2022-05-26
Payer: COMMERCIAL

## 2022-05-26 VITALS
TEMPERATURE: 97.5 F | SYSTOLIC BLOOD PRESSURE: 108 MMHG | OXYGEN SATURATION: 98 % | HEART RATE: 58 BPM | DIASTOLIC BLOOD PRESSURE: 68 MMHG | RESPIRATION RATE: 18 BRPM

## 2022-05-26 DIAGNOSIS — I65.23 BILATERAL CAROTID ARTERY STENOSIS: ICD-10-CM

## 2022-05-26 DIAGNOSIS — I65.23 BILATERAL CAROTID ARTERY STENOSIS: Primary | ICD-10-CM

## 2022-05-26 PROCEDURE — 93880 EXTRACRANIAL BILAT STUDY: CPT

## 2022-05-26 PROCEDURE — 99213 OFFICE O/P EST LOW 20 MIN: CPT | Performed by: NURSE PRACTITIONER

## 2022-05-26 NOTE — PROGRESS NOTES
tablet 1 PO PRN migraine. Can repeat in 2 hours. Maximum is 2 in 24 hours and 4 in a week. 12 tablet 5    Galcanezumab-gnlm (EMGALITY, 300 MG DOSE,) 100 MG/ML SOSY Inject 300 mg into the skin every 30 days 3 Syringe 5    SUMAtriptan Succinate 6 MG/0.5ML SOAJ INJECT 1 SYRINGE AT ONSET OF A HEADACHE, MAY REPEAT IN 2 HOURS IF HEADACHE PERSISTS. MAX OF 2 IN 24 HOURS OR 4 WEEKLY 12 Cartridge 5    melatonin 3 MG TABS tablet Take 3 mg by mouth daily Indications: 2 PO QHS      aspirin 81 MG tablet Take 81 mg by mouth daily        No current facility-administered medications for this visit. Allergies: Patient has no known allergies. Past Medical History:   Diagnosis Date    BPH (benign prostatic hyperplasia)     Carotid artery stenosis     Cluster headache, intractable     Hyperlipidemia     Malignant neoplasm of left lung (HCC)     Malignant neoplasm of left lung (HCC)     Migraine headache     Other malaise and fatigue      Past Surgical History:   Procedure Laterality Date    BACK SURGERY      COLONOSCOPY  03/15/2013    Pamela: adenomatous and hyperplastic polyps (5 yr)    HERNIA REPAIR      LOBECTOMY Left     upper lobe    [place] COLONOSCOPY FLX DX W/COLLJ SPEC WHEN PFRMD N/A 2018    Dr Rosalia Kapadia, 5 yr recall     Family History   Problem Relation Age of Onset    Cancer Mother         Lung CA    Cancer Father         Breast CA    Other Sister         aneurysm    Cancer Other 58    Cancer Other 59     Social History     Tobacco Use    Smoking status: Former Smoker     Packs/day: 1.00     Years: 35.00     Pack years: 35.00     Types: Cigarettes     Quit date: 10/18/2020     Years since quittin.6    Smokeless tobacco: Never Used   Substance Use Topics    Alcohol use: No     Alcohol/week: 0.0 standard drinks         Eyes  no sudden vision change or amaurosis. Respiratory  no significant shortness of breath,  Cardiovascular  no chest pain or syncope. No  significant leg swelling. no claudication. Musculoskeletal  no gait disturbance  Skin  no new wound. Neurologic   No speech difficulty or lateralizing weakness. All other review of systems are negative. Physical Exam    /68 Comment: left  Pulse 58   Temp 97.5 °F (36.4 °C)   Resp 18   SpO2 98%       Neck- carotid pulses 2+ to palpation with no bruit  Cardiovascular  Regular rate and rhythm. Pulmonary  effort appears normal.  No respiratory distress. Lungs - Breath sounds normal. No wheezes or rales. Extremities - without edema   Neurologic  alert and oriented X 3. Physiologic. Face symmetric. Skin  warm, dry, and intact. no wound  Psychiatric  mood, affect, and behavior appear normal.  Judgment and thought processes appear normal.    Risk factors for atherosclerosis of all vascular beds have been reviewed with the patient including:  Family history, tobacco abuse in all forms, elevated cholesterol, hyperlipidemia, and diabetes. Doppler results:    Right CCA/ICA <50% stenotic  Left CCA/ICA <50% stenotic  Right verterbral artery flow is antegrade  Left verterbral artery flow is antegrade  Individual velocities reviewed: Yes. Results were reviewed with the patient. Disease process is stable and chronic  by velocity criteria, I see no significant change        Reviewed previous studies including: carotid u/s  Individual images were reviewed. I agree with the findings  Results were discussed with the patient. ASSESSMENT/PLAN:  1. Bilateral carotid artery stenosis  -     VL DUP CAROTID BILATERAL;  Future        Discussed management of carotid u/s which includes:  continue asa to reduce risk of TIA/CVA, to reduce risk of arterial thrombosis and to decrease rate of plaque buildup  Strongly encourage start/continue statin therapy -   Recommend no smoking - discussed the effect tobacco has on illness;   Proceed with 12 months with cvls         Patient instructed to call or proceed to the emergency room with any symptoms of lateralizing weakness, loss of vision in one eye, or episodes slurred speech. An electronic signature was used to authenticate this note.     --GUILHERME Tanner

## 2022-07-22 DIAGNOSIS — G44.021 INTRACTABLE CHRONIC CLUSTER HEADACHE: ICD-10-CM

## 2022-07-22 RX ORDER — VERAPAMIL HYDROCHLORIDE 240 MG/1
TABLET, FILM COATED, EXTENDED RELEASE ORAL
Qty: 180 TABLET | Refills: 3 | Status: SHIPPED | OUTPATIENT
Start: 2022-07-22

## 2022-08-12 ENCOUNTER — TELEPHONE (OUTPATIENT)
Dept: PRIMARY CARE CLINIC | Age: 62
End: 2022-08-12

## 2022-08-12 DIAGNOSIS — U07.1 COVID-19: Primary | ICD-10-CM

## 2022-08-12 RX ORDER — DEXAMETHASONE 6 MG/1
6 TABLET ORAL 2 TIMES DAILY WITH MEALS
Qty: 20 TABLET | Refills: 0 | Status: SHIPPED | OUTPATIENT
Start: 2022-08-12 | End: 2022-08-19

## 2022-08-12 RX ORDER — AZITHROMYCIN 250 MG/1
TABLET, FILM COATED ORAL
Qty: 1 PACKET | Refills: 0 | Status: SHIPPED | OUTPATIENT
Start: 2022-08-12 | End: 2022-08-19

## 2022-08-12 NOTE — TELEPHONE ENCOUNTER
----- Message from David Bourne sent at 8/12/2022  8:53 AM CDT -----  Subject: Message to Provider    QUESTIONS  Information for Provider? Pt's wife called b/c she was positive for covid   previously and has gotten better but the pt is now having many of the   symptoms and they are pretty sure he has Covid also. Wanted to know if   there is something that could be prescribed and would like to avoid an   appt if possible due to certainty about the issue. Can you call them and   advise?  ---------------------------------------------------------------------------  --------------  CALL BACK INFO  [phone]; OK to leave message on voicemail  ---------------------------------------------------------------------------  --------------  SCRIPT ANSWERS  Relationship to Patient? Other  Representative Name? Cata Noe-Spouse  Is the Representative on the appropriate HIPAA document in Epic?  Yes

## 2022-08-12 NOTE — TELEPHONE ENCOUNTER
I did send an antibiotic and steroid to the pharmacy for him. Go over the things below with them about COVID that we do for supportive care. If he gets worse go to the ER over the weekend  Covid Supportive Treatments  Zinc  mg daily for 5 days and then decrease to 50 mg a day. Sometimes higher dose may cause nausea  Vitamin C 1000 mg a day. Vitamin D 20 -25 mcg a day. Aspirin 81mg unless you have stomach problems or allergic  Daily antihistamine of choice ( Claritin, Allegra, or Zyrtec)  Tylenol for aches, pain and fever. You must get up and move around. Look up Covid breathing exercises. Try to lay on your stomach for periods of time to help with the fluid in your lungs. You can look up Proning on the Internet. Your provider may also send in prescriptions for symptom specific treatment. Covid is a viral pneumonia and some antibiotics will not help this. Mucinex or delsym for chest congestion. Check oxygen saturation regularly and if below 90% go to the ER. You may qualify for home oxygen and breathing treatments if your oxygen is staying around 90%. According to current [place] guidelines if you have tested positive for COVID and have symptoms you are to isolate for 10 days from the day the symptoms began. If you tested positive for COVID and have never had symptoms you must isolate for 5 days from the day of your test.  If you are not fully vaccinated or booster eligible but not yet boostered and have been in close contact with someone diagnosed with COVID quarantine from 10 days from the exposure.   This may be shortened to 5 days if you have no symptoms and tested negative for COVID on day 5

## 2022-08-19 ENCOUNTER — OFFICE VISIT (OUTPATIENT)
Dept: PRIMARY CARE CLINIC | Age: 62
End: 2022-08-19
Payer: COMMERCIAL

## 2022-08-19 VITALS
BODY MASS INDEX: 27.57 KG/M2 | WEIGHT: 208 LBS | SYSTOLIC BLOOD PRESSURE: 120 MMHG | HEART RATE: 57 BPM | TEMPERATURE: 97.1 F | HEIGHT: 73 IN | OXYGEN SATURATION: 98 % | DIASTOLIC BLOOD PRESSURE: 80 MMHG

## 2022-08-19 DIAGNOSIS — C34.32 MALIGNANT NEOPLASM OF LOWER LOBE OF LEFT LUNG (HCC): ICD-10-CM

## 2022-08-19 DIAGNOSIS — Z13.1 SCREENING FOR DIABETES MELLITUS: ICD-10-CM

## 2022-08-19 DIAGNOSIS — Z00.00 WELL ADULT EXAM: Primary | ICD-10-CM

## 2022-08-19 DIAGNOSIS — E78.2 MIXED HYPERLIPIDEMIA: ICD-10-CM

## 2022-08-19 DIAGNOSIS — Z13.220 ENCOUNTER FOR SCREENING FOR LIPID DISORDER: ICD-10-CM

## 2022-08-19 PROCEDURE — 99396 PREV VISIT EST AGE 40-64: CPT | Performed by: NURSE PRACTITIONER

## 2022-08-19 RX ORDER — OMEPRAZOLE 40 MG/1
40 CAPSULE, DELAYED RELEASE ORAL
Qty: 30 CAPSULE | Refills: 5 | Status: SHIPPED | OUTPATIENT
Start: 2022-08-19

## 2022-08-19 SDOH — ECONOMIC STABILITY: FOOD INSECURITY: WITHIN THE PAST 12 MONTHS, YOU WORRIED THAT YOUR FOOD WOULD RUN OUT BEFORE YOU GOT MONEY TO BUY MORE.: NEVER TRUE

## 2022-08-19 SDOH — ECONOMIC STABILITY: FOOD INSECURITY: WITHIN THE PAST 12 MONTHS, THE FOOD YOU BOUGHT JUST DIDN'T LAST AND YOU DIDN'T HAVE MONEY TO GET MORE.: NEVER TRUE

## 2022-08-19 ASSESSMENT — SOCIAL DETERMINANTS OF HEALTH (SDOH): HOW HARD IS IT FOR YOU TO PAY FOR THE VERY BASICS LIKE FOOD, HOUSING, MEDICAL CARE, AND HEATING?: NOT HARD AT ALL

## 2022-08-19 ASSESSMENT — PATIENT HEALTH QUESTIONNAIRE - PHQ9
2. FEELING DOWN, DEPRESSED OR HOPELESS: 0
SUM OF ALL RESPONSES TO PHQ QUESTIONS 1-9: 0
1. LITTLE INTEREST OR PLEASURE IN DOING THINGS: 0
SUM OF ALL RESPONSES TO PHQ QUESTIONS 1-9: 0
SUM OF ALL RESPONSES TO PHQ9 QUESTIONS 1 & 2: 0
SUM OF ALL RESPONSES TO PHQ QUESTIONS 1-9: 0
SUM OF ALL RESPONSES TO PHQ QUESTIONS 1-9: 0

## 2022-08-19 ASSESSMENT — ENCOUNTER SYMPTOMS
ALLERGIC/IMMUNOLOGIC NEGATIVE: 1
EYES NEGATIVE: 1
RESPIRATORY NEGATIVE: 1

## 2022-08-19 NOTE — PATIENT INSTRUCTIONS
Return for fasting labs    Start the omeprazole for the acid reflux if its not improving we can send you to GI to do an upper endoscopy.   Continue surveillance with Dr. Quiles Memory with vascular for your carotids  Work on diet and exercise

## 2022-08-19 NOTE — PROGRESS NOTES
[address] [facility]  [address] [address] [place] [address]  [address] [place] [address]  Dept: [phone]  Dept Fax: [phone]  Loc: [phone]    Amy Gil is a 65 y.o. male who presents today for his medical conditions/complaints as noted below. Amy Gil is c/o of Annual Exam (Non-fasting./Patient states he tested positive for Covid last week @ home.  )        HPI:     HPI   Chief Complaint   Patient presents with    Annual Exam     Non-fasting. Patient states he tested positive for Covid last week @ home. He is feeling much better. He took his steroids and antibiotics. He is being surveillance by Dr. Julio Nixon for his lung cancer. He is healed very well. He still has that pain over the incision in his left ribs. It comes and goes it is just a discomfort. He does not see an oncologist.  He does also see vascular for his carotids they are stable less than 50% blockage.   Past Medical History:   Diagnosis Date    BPH (benign prostatic hyperplasia)     Carotid artery stenosis     Cluster headache, intractable     Hyperlipidemia     Hypertension     Malignant neoplasm of left lung (HCC)     Malignant neoplasm of left lung (HCC)     Migraine headache     Other malaise and fatigue       Past Surgical History:   Procedure Laterality Date    BACK SURGERY      COLONOSCOPY  03/15/2013    Pamela: adenomatous and hyperplastic polyps (5 yr)    HERNIA REPAIR      LOBECTOMY Left     upper lobe    [place] COLONOSCOPY FLX DX W/COLLJ SPEC WHEN PFRMD N/A 04/20/2018    Dr Rankin Officer, 5 yr recall       Vitals 8/19/2022 5/26/2022 5/26/2022 9/23/2021 8/13/2021 2/11/6052   SYSTOLIC 633 396 [phone]   DIASTOLIC 80 68 71 72 80 68   Pulse 57 58 58 65 67 72   Temp 97.1 - 97.5 - 96.9 -   Resp - 18 18 - - 18   SpO2 98 - 98 - 97 -   Weight 208 lb - - 200 lb 200 lb 198 lb   Height 6' 1\" - - 6' 1\" 6' 1\" 6' 1\"   Body mass index 27.44 kg/m2 - - 26.38 kg/m2 26.38 kg/m2 26.12 kg/m2   Some recent data might be hidden Family History   Problem Relation Age of Onset    Cancer Mother         Lung CA    Cancer Father         Breast CA    Other Sister         aneurysm    Cancer Other 58    Cancer Other 59       Social History     Tobacco Use    Smoking status: Former     Packs/day: 1.00     Years: 35.00     Pack years: 35.00     Types: Cigarettes     Quit date: 10/18/2020     Years since quittin.8    Smokeless tobacco: Never   Substance Use Topics    Alcohol use: No     Alcohol/week: 0.0 standard drinks      Current Outpatient Medications on File Prior to Visit   Medication Sig Dispense Refill    verapamil (CALAN SR) 240 MG extended release tablet TAKE 1 TABLET BY MOUTH TWICE DAILY 180 tablet 3    butalbital-acetaminophen-caffeine (FIORICET, ESGIC) -40 MG per tablet TAKE 1 TABLET BY MOUTH TWICE DAILY AS NEEDED FOR HEADACHE 30 tablet 3    sildenafil (VIAGRA) 50 MG tablet TAKE 1 TABLET BY MOUTH EVERY DAY AS NEEDED 12 tablet 3    rosuvastatin (CRESTOR) 10 MG tablet TAKE 1 TABLET BY MOUTH DAILY 90 tablet 3    methocarbamol (ROBAXIN) 500 MG tablet Take 500 mg by mouth every 8 hours as needed      traMADol (ULTRAM) 50 MG tablet Take 50 mg by mouth nightly as needed. SUMAtriptan (IMITREX) 100 MG tablet TAKE 1 TABLET BY MOUTH ONSET OF HEADACHE, MAY REPEAT IN 2 HOURS IF HEADACHE PERSISTS, MAX OF 2 TABLET IN 24 HOURS OR 4 TABLET WEEKLY 9 tablet 5    eletriptan (RELPAX) 40 MG tablet 1 PO PRN migraine. Can repeat in 2 hours. Maximum is 2 in 24 hours and 4 in a week. 12 tablet 5    Galcanezumab-gnlm (EMGALITY, 300 MG DOSE,) 100 MG/ML SOSY Inject 300 mg into the skin every 30 days 3 Syringe 5    SUMAtriptan Succinate 6 MG/0.5ML SOAJ INJECT 1 SYRINGE AT ONSET OF A HEADACHE, MAY REPEAT IN 2 HOURS IF HEADACHE PERSISTS.  MAX OF 2 IN 24 HOURS OR 4 WEEKLY 12 Cartridge 5    melatonin 3 MG TABS tablet Take 3 mg by mouth daily Indications: 2 PO QHS      aspirin 81 MG tablet Take 81 mg by mouth daily        No current facility-administered medications on file prior to visit. No Known Allergies    Health Maintenance   Topic Date Due    HIV screen  Never done    Hepatitis C screen  Never done    Shingles vaccine (1 of 2) Never done    Low dose CT lung screening  08/09/2020    Lipids  08/14/2021    COVID-19 Vaccine (3 - Booster for Moderna series) 08/29/2021    Depression Screen  08/13/2022    DTaP/Tdap/Td vaccine (1 - Tdap) 02/11/2026 (Originally 6/28/2016)    Flu vaccine (1) 09/01/2022    Colorectal Cancer Screen  04/20/2023    Hepatitis A vaccine  Aged Out    Hepatitis B vaccine  Aged Out    Hib vaccine  Aged Out    Meningococcal (ACWY) vaccine  Aged Out    Pneumococcal 0-64 years Vaccine  Aged Out       Subjective:      Review of Systems   Constitutional: Negative. HENT: Negative. Eyes: Negative. Respiratory: Negative. Cardiovascular: Negative. Gastrointestinal:         Vearl Foyer   Endocrine: Negative. Genitourinary: Negative. Musculoskeletal: Negative. Allergic/Immunologic: Negative. Neurological: Negative. Hematological: Negative. Psychiatric/Behavioral: Negative. Objective:     Physical Exam  Vitals and nursing note reviewed. Constitutional:       Appearance: Normal appearance. He is well-developed. HENT:      Head: Normocephalic. Right Ear: Tympanic membrane and external ear normal.      Left Ear: Tympanic membrane and external ear normal.      Nose: Nose normal.      Mouth/Throat:      Mouth: Mucous membranes are moist.   Eyes:      Pupils: Pupils are equal, round, and reactive to light. Cardiovascular:      Rate and Rhythm: Normal rate and regular rhythm. Heart sounds: Normal heart sounds. Pulmonary:      Effort: Pulmonary effort is normal.      Breath sounds: Normal breath sounds. Abdominal:      General: Bowel sounds are normal.       Genitourinary:     Comments: Declined gu exam  Musculoskeletal:         General: Normal range of motion.       Cervical back: Normal range of motion. Skin:     General: Skin is warm and dry. Capillary Refill: Capillary refill takes less than 2 seconds. Neurological:      General: No focal deficit present. Mental Status: He is alert and oriented to person, place, and time. Psychiatric:         Mood and Affect: Mood normal.         Behavior: Behavior normal.         Thought Content: Thought content normal.         Judgment: Judgment normal.     /80   Pulse 57   Temp 97.1 °F (36.2 °C)   Ht 6' 1\" (1.854 m)   Wt 208 lb (94.3 kg)   SpO2 98%   BMI 27.44 kg/m²     Assessment:       Diagnosis Orders   1. Well adult exam  CBC    Comprehensive Metabolic Panel    Lipid Panel    omeprazole (PRILOSEC) 40 MG delayed release capsule      2. Mixed hyperlipidemia  Lipid Panel      3. Encounter for screening for lipid disorder  Lipid Panel      4. Screening for diabetes mellitus  Comprehensive Metabolic Panel      5. Malignant neoplasm of lower lobe of left lung ([facility] [address]  CBC            Plan:       PDMP Monitoring:    Last PDMP Luis as Reviewed:  Review User Review Instant Review Result            Urine Drug Screenings (1 yr)       POCT Rapid Drug Screen  Collected: 3/18/2021  1:35 PM (Final result)                  Medication Contract and Consent for Opioid Use Documents Filed       Patient Documents       Type of Document Status Date Received Received By Description    Medication Contract Signed 6/29/2017  2:33 PM Amelia Sparrow     Medication Contract Received 10/27/2020 10:38 AM YRIS BENJAMIN CONTRACT                      Patient given educational materials -see patient instructions. Discussed use, benefit, and side effects of prescribed medications. All patient questions answered. Pt voiced understanding. Reviewed health maintenance. Instructed to continue currentmedications, diet and exercise. Patient agreed with treatment plan. Follow up as directed.    MEDICATIONS:  Orders Placed This Encounter   Medications omeprazole (PRILOSEC) 40 MG delayed release capsule     Sig: Take 1 capsule by mouth every morning (before breakfast)     Dispense:  30 capsule     Refill:  5         ORDERS:  Orders Placed This Encounter   Procedures    CBC    Comprehensive Metabolic Panel    Lipid Panel       Follow-up:  Return in about 1 year (around 8/19/2023). PATIENT INSTRUCTIONS:  Patient Instructions   Return for fasting labs    Start the omeprazole for the acid reflux if its not improving we can send you to GI to do an upper endoscopy. Continue surveillance with Dr. Dhruv Jim with vascular for your carotids  Work on diet and exercise  Electronically signed by GUILHERME Gracia CNP on 8/19/2022 at 8:35 AM    EMR Dragon/transcription disclaimer:  Much of thisencounter note is electronic transcription/translation of spoken language to printed texts. The electronic translation of spoken language may be erroneous, or at times, nonsensical words or phrases may be inadvertentlytranscribed.   Although I have reviewed the note for such errors, some may still exist.

## 2022-08-25 ENCOUNTER — TELEPHONE (OUTPATIENT)
Dept: PRIMARY CARE CLINIC | Age: 62
End: 2022-08-25

## 2022-08-25 ENCOUNTER — OFFICE VISIT (OUTPATIENT)
Dept: PRIMARY CARE CLINIC | Age: 62
End: 2022-08-25
Payer: COMMERCIAL

## 2022-08-25 VITALS
SYSTOLIC BLOOD PRESSURE: 116 MMHG | OXYGEN SATURATION: 97 % | WEIGHT: 209 LBS | HEART RATE: 66 BPM | BODY MASS INDEX: 27.7 KG/M2 | TEMPERATURE: 97.6 F | RESPIRATION RATE: 18 BRPM | HEIGHT: 73 IN | DIASTOLIC BLOOD PRESSURE: 72 MMHG

## 2022-08-25 DIAGNOSIS — G93.32 POST-COVID CHRONIC FATIGUE: Primary | ICD-10-CM

## 2022-08-25 DIAGNOSIS — U09.9 POST-COVID CHRONIC FATIGUE: Primary | ICD-10-CM

## 2022-08-25 DIAGNOSIS — M25.50 ARTHRALGIA, UNSPECIFIED JOINT: ICD-10-CM

## 2022-08-25 PROCEDURE — 99213 OFFICE O/P EST LOW 20 MIN: CPT | Performed by: NURSE PRACTITIONER

## 2022-08-25 ASSESSMENT — ENCOUNTER SYMPTOMS: SORE THROAT: 1

## 2022-08-25 NOTE — PROGRESS NOTES
[address] [facility]  [address] [place] [address]  [address] [place] [address]  Dept: [phone]  Dept Fax: [phone]  Loc: [phone]    Christina Montejo is a 65 y.o. male who presents today for his medical conditions/complaints as noted below. Christina Montejo is c/o of Post-COVID Symptoms (Pt is here for post covid symptoms. He states his joints are achey and feels fatigue. Pt states he feels like he has lumps in his throat that are bothering him. )        HPI:     HPI   Chief Complaint   Patient presents with    Post-COVID Symptoms     Pt is here for post covid symptoms. He states his joints are achey and feels fatigue. Pt states he feels like he has lumps in his throat that are bothering him. His COVID was about 2 weeks ago. I had seen him after that he said he had finished his steroids and antibiotics. He is being surveillance by Dr. Edson Kyle for lung cancer. He did not have to have any radiation or chemo after his lung cancer which was 2 years ago. He is due for a lung screening in September. He did not feel well when he was on the steroids during COVID but he finished them. Since having COVID he is continually had something that felt like he was having a corn husk stuck down deep in his throat and he is try to drink several things to get it to pass but it never does. He also feels like he has had lymph nodes that come and go in his neck with inflammation. His joints all hurt mainly his knees and his hips. He is try to take anti-inflammatories without relief. He has not had a fever. He has not had a cough.   Past Medical History:   Diagnosis Date    BPH (benign prostatic hyperplasia)     Carotid artery stenosis     Cluster headache, intractable     Hyperlipidemia     Hypertension     Malignant neoplasm of left lung (HCC)     Malignant neoplasm of left lung (HCC)     Migraine headache     Other malaise and fatigue       Past Surgical History:   Procedure Laterality Date    BACK SURGERY COLONOSCOPY  03/15/2013    Pamela: adenomatous and hyperplastic polyps (5 yr)    HERNIA REPAIR      LOBECTOMY Left     upper lobe    [place] COLONOSCOPY FLX DX W/COLLJ SPEC WHEN PFRMD N/A 2018    Dr Abdelrahman Montenegro, 5 yr recall       Vitals 2022   SYSTOLIC 063 021 776 825 040 270   DIASTOLIC 72 80 68 71 72 80   Site Left Upper Arm - - - - -   Position Sitting - - - - -   Cuff Size Medium Adult - - - - -   Pulse 66 57 58 58 65 67   Temp 97.6 97.1 - 97.5 - 96.9   Resp 18 - 18 18 - -   SpO2 97 98 - 98 - 97   Weight 209 lb 208 lb - - 200 lb 200 lb   Height 6' 1\" 6' 1\" - - 6' 1\" 6' 1\"   Body mass index 27.57 kg/m2 27.44 kg/m2 - - 26.38 kg/m2 26.38 kg/m2   Some recent data might be hidden       Family History   Problem Relation Age of Onset    Cancer Mother         Lung CA    Cancer Father         Breast CA    Other Sister         aneurysm    Cancer Other 58    Cancer Other 59       Social History     Tobacco Use    Smoking status: Former     Packs/day: 1.00     Years: 35.00     Pack years: 35.00     Types: Cigarettes     Quit date: 10/18/2020     Years since quittin.8    Smokeless tobacco: Never   Substance Use Topics    Alcohol use: No     Alcohol/week: 0.0 standard drinks      Current Outpatient Medications on File Prior to Visit   Medication Sig Dispense Refill    omeprazole (PRILOSEC) 40 MG delayed release capsule Take 1 capsule by mouth every morning (before breakfast) 30 capsule 5    verapamil (CALAN SR) 240 MG extended release tablet TAKE 1 TABLET BY MOUTH TWICE DAILY 180 tablet 3    butalbital-acetaminophen-caffeine (FIORICET, ESGIC) -40 MG per tablet TAKE 1 TABLET BY MOUTH TWICE DAILY AS NEEDED FOR HEADACHE 30 tablet 3    sildenafil (VIAGRA) 50 MG tablet TAKE 1 TABLET BY MOUTH EVERY DAY AS NEEDED 12 tablet 3    rosuvastatin (CRESTOR) 10 MG tablet TAKE 1 TABLET BY MOUTH DAILY 90 tablet 3    methocarbamol (ROBAXIN) 500 MG tablet Take 500 mg by mouth every 8 hours as needed      traMADol (ULTRAM) 50 MG tablet Take 50 mg by mouth nightly as needed. SUMAtriptan (IMITREX) 100 MG tablet TAKE 1 TABLET BY MOUTH ONSET OF HEADACHE, MAY REPEAT IN 2 HOURS IF HEADACHE PERSISTS, MAX OF 2 TABLET IN 24 HOURS OR 4 TABLET WEEKLY 9 tablet 5    eletriptan (RELPAX) 40 MG tablet 1 PO PRN migraine. Can repeat in 2 hours. Maximum is 2 in 24 hours and 4 in a week. 12 tablet 5    Galcanezumab-gnlm (EMGALITY, 300 MG DOSE,) 100 MG/ML SOSY Inject 300 mg into the skin every 30 days 3 Syringe 5    SUMAtriptan Succinate 6 MG/0.5ML SOAJ INJECT 1 SYRINGE AT ONSET OF A HEADACHE, MAY REPEAT IN 2 HOURS IF HEADACHE PERSISTS. MAX OF 2 IN 24 HOURS OR 4 WEEKLY 12 Cartridge 5    melatonin 3 MG TABS tablet Take 3 mg by mouth daily Indications: 2 PO QHS      aspirin 81 MG tablet Take 81 mg by mouth daily        No current facility-administered medications on file prior to visit. No Known Allergies    Health Maintenance   Topic Date Due    HIV screen  Never done    Hepatitis C screen  Never done    Shingles vaccine (1 of 2) Never done    Low dose CT lung screening  08/09/2020    Lipids  08/14/2021    COVID-19 Vaccine (3 - Booster for Moderna series) 08/29/2021    DTaP/Tdap/Td vaccine (1 - Tdap) 02/11/2026 (Originally 6/28/2016)    Flu vaccine (1) 09/01/2022    Colorectal Cancer Screen  04/20/2023    Depression Screen  08/19/2023    Hepatitis A vaccine  Aged Out    Hepatitis B vaccine  Aged Out    Hib vaccine  Aged Out    Meningococcal (ACWY) vaccine  Aged Out    Pneumococcal 0-64 years Vaccine  Aged Out       Subjective:      Review of Systems   Constitutional:  Positive for activity change. HENT:  Positive for sore throat. Musculoskeletal:  Positive for arthralgias. Psychiatric/Behavioral: Negative. Objective:     Physical Exam  Vitals and nursing note reviewed. Constitutional:       Appearance: Normal appearance. He is well-developed. HENT:      Head: Normocephalic. Rapid Drug Screen  Collected: 3/18/2021  1:35 PM (Final result)                  Medication Contract and Consent for Opioid Use Documents Filed       Patient Documents       Type of Document Status Date Received Received By Description    Medication Contract Signed 6/29/2017  2:33 PM Jacquelyn Greenr     Medication Contract Received 10/27/2020 10:38 AM YRIS BENJAMIN CONTRACT                      Patient given educational materials -see patient instructions. Discussed use, benefit, and side effects of prescribed medications. All patient questions answered. Pt voiced understanding. Reviewed health maintenance. Instructed to continue currentmedications, diet and exercise. Patient agreed with treatment plan. Follow up as directed. MEDICATIONS:  No orders of the defined types were placed in this encounter. ORDERS:  Orders Placed This Encounter   Procedures    Sedimentation Rate    C-Reactive Protein    CK       Follow-up:  No follow-ups on file. PATIENT INSTRUCTIONS:  There are no Patient Instructions on file for this visit. Electronically signed by GUILHERME Victoria CNP on 8/25/2022 at 2:45 PM    EMR Dragon/transcription disclaimer:  Much of thisencounter note is electronic transcription/translation of spoken language to printed texts. The electronic translation of spoken language may be erroneous, or at times, nonsensical words or phrases may be inadvertentlytranscribed.   Although I have reviewed the note for such errors, some may still exist.

## 2022-08-25 NOTE — TELEPHONE ENCOUNTER
Yes I would assume that all those things could be possible from COVID since it is not an inflammatory response. He was on steroids so I do not think we really need to give him any more steroids. I am very concerned about the \"lumps \"in his throat given that he has had a scare with lung cancer. I think he needs to see me or the oncologist about this.

## 2022-08-25 NOTE — TELEPHONE ENCOUNTER
Pt states he had Covid a while back and since has developed Lumps in his throat and Joint pain. Pt asking if this could be results from Covid? Asking if he needs to do anything or ride it out?

## 2022-08-26 DIAGNOSIS — E78.2 MIXED HYPERLIPIDEMIA: ICD-10-CM

## 2022-08-26 DIAGNOSIS — C34.32 MALIGNANT NEOPLASM OF LOWER LOBE OF LEFT LUNG (HCC): ICD-10-CM

## 2022-08-26 DIAGNOSIS — Z13.220 ENCOUNTER FOR SCREENING FOR LIPID DISORDER: ICD-10-CM

## 2022-08-26 DIAGNOSIS — M25.50 ARTHRALGIA, UNSPECIFIED JOINT: ICD-10-CM

## 2022-08-26 DIAGNOSIS — G93.32 POST-COVID CHRONIC FATIGUE: ICD-10-CM

## 2022-08-26 DIAGNOSIS — Z13.1 SCREENING FOR DIABETES MELLITUS: ICD-10-CM

## 2022-08-26 DIAGNOSIS — Z00.00 WELL ADULT EXAM: ICD-10-CM

## 2022-08-26 DIAGNOSIS — U09.9 POST-COVID CHRONIC FATIGUE: ICD-10-CM

## 2022-08-26 LAB
ALBUMIN SERPL-MCNC: 3.7 G/DL (ref 3.5–5.2)
ALP BLD-CCNC: 93 U/L (ref 40–130)
ALT SERPL-CCNC: 39 U/L (ref 5–41)
ANION GAP SERPL CALCULATED.3IONS-SCNC: 8 MMOL/L (ref 7–19)
AST SERPL-CCNC: 16 U/L (ref 5–40)
BILIRUB SERPL-MCNC: 0.5 MG/DL (ref 0.2–1.2)
BUN BLDV-MCNC: 14 MG/DL (ref 8–23)
C-REACTIVE PROTEIN: 5.41 MG/DL (ref 0–0.5)
CALCIUM SERPL-MCNC: 8.7 MG/DL (ref 8.8–10.2)
CHLORIDE BLD-SCNC: 106 MMOL/L (ref 98–111)
CHOLESTEROL, TOTAL: 161 MG/DL (ref 160–199)
CO2: 26 MMOL/L (ref 22–29)
CREAT SERPL-MCNC: 1 MG/DL (ref 0.5–1.2)
GFR AFRICAN AMERICAN: >59
GFR NON-AFRICAN AMERICAN: >60
GLUCOSE BLD-MCNC: 101 MG/DL (ref 74–109)
HCT VFR BLD CALC: 42.5 % (ref 42–52)
HDLC SERPL-MCNC: 39 MG/DL (ref 55–121)
HEMOGLOBIN: 13.9 G/DL (ref 14–18)
LDL CHOLESTEROL CALCULATED: 105 MG/DL
MCH RBC QN AUTO: 32.6 PG (ref 27–31)
MCHC RBC AUTO-ENTMCNC: 32.7 G/DL (ref 33–37)
MCV RBC AUTO: 99.5 FL (ref 80–94)
PDW BLD-RTO: 14.3 % (ref 11.5–14.5)
PLATELET # BLD: 168 K/UL (ref 130–400)
PMV BLD AUTO: 10.2 FL (ref 9.4–12.4)
POTASSIUM SERPL-SCNC: 4.8 MMOL/L (ref 3.5–5)
RBC # BLD: 4.27 M/UL (ref 4.7–6.1)
SEDIMENTATION RATE, ERYTHROCYTE: 18 MM/HR (ref 0–15)
SODIUM BLD-SCNC: 140 MMOL/L (ref 136–145)
TOTAL CK: 44 U/L (ref 39–308)
TOTAL PROTEIN: 6.2 G/DL (ref 6.6–8.7)
TRIGL SERPL-MCNC: 85 MG/DL (ref 0–149)
WBC # BLD: 16.4 K/UL (ref 4.8–10.8)

## 2022-08-26 RX ORDER — LEVOFLOXACIN 500 MG/1
500 TABLET, FILM COATED ORAL DAILY
Qty: 7 TABLET | Refills: 0 | Status: SHIPPED | OUTPATIENT
Start: 2022-08-26 | End: 2022-09-02

## 2022-09-20 ENCOUNTER — HOSPITAL ENCOUNTER (OUTPATIENT)
Dept: CT IMAGING | Facility: HOSPITAL | Age: 62
Discharge: HOME | End: 2022-09-20
Admitting: NURSE PRACTITIONER

## 2022-09-20 DIAGNOSIS — R91.8 MULTIPLE LUNG NODULES: ICD-10-CM

## 2022-09-20 PROCEDURE — 71250 CT THORAX DX C-: CPT

## 2022-09-21 NOTE — PROGRESS NOTES
" DIONY Fermin  [facility]   Pulmonary and Critical Care  [address]  [place] [address]  Phone: [phone]  Fax: [phone]           Chief Complaint  Multiple lung nodules and Adenocarcinoma of left lung  (Had covid aug 12)    Subjective    History of Present Illness     Carlos Anaya presents to [facility] PULMONARY & CRITICAL CARE MEDICINE   History of Present Illness  Mr. Anaya is a pleasant 62 year old male patient of Dr. Jimbo Adam with known Adenocarcinoma of the left lung post left upper lobectomy by Dr. Morrow in Oct 2020. He was identified with a 7 mm spiculated nodule in the ABDOUL in Feb 2020. Former smoker (quit Oct 2020). Known emphysema and mild obstruction per last pulmonary function test. He is here today for follow up CT which has shown stability in multiple 4-7 mm nodules and increase in a Right upper lobe from 1.0 cm to 1.6 cm and less solid. Review of PET scan from Sept 2020 showed no uptake in this nodule. He denies fever, chills, night sweats.  He denies hemoptysis.  He denies any unexplained weight loss. He denies cough or shortness of breath. He noted he had covid on August 12th. He had flu like symptoms. He was not hospitalized.        Objective   Vital Signs:   /66   Pulse 77   Ht 185.4 cm (73\")   Wt 95.3 kg (210 lb)   SpO2 97%   BMI 27.71 kg/m²     Physical Exam  Vitals reviewed.   Constitutional:       Appearance: Normal appearance.   Cardiovascular:      Rate and Rhythm: Normal rate and regular rhythm.   Pulmonary:      Effort: Pulmonary effort is normal.      Breath sounds: Normal breath sounds.   Neurological:      General: No focal deficit present.      Mental Status: He is alert and oriented to person, place, and time.   Psychiatric:         Mood and Affect: Mood normal.         Behavior: Behavior normal.          Result Review :  The following data was reviewed by: DIONY Fermin on 09/22/2022:    Data " reviewed: Radiologic studies CT chest 9/2022    My interpretation of imaging:  Increased pulmonary nodule of the right upper lobe from 1.0 cm to 1.6 however appears less solid. Stable 4-7mm nodules.  Discussed with Dr. Adam.   My interpretation of labs: none   CT Chest Without Contrast Diagnostic (09/20/2022 09:27)    PFT Values        Some values may be hidden. Unless noted otherwise, only the newest values recorded on each date are displayed.         Old Values PFT Results 9/2/21   No data to display.      Pre Drug PFT Results 9/2/21   FVC 93   FEV1 80   FEF 25-75% 46   FEV1/FVC 68.56      Post Drug PFT Results 9/2/21   No data to display.      Other Tests PFT Results 9/2/21   DLCO 84   D/VAsb 101           My interpretation of the PFT: No new     Results for orders placed in visit on 09/02/21    Pulmonary Function Test    Narrative  Pulmonary Function Test  Performed by: Ankita Bowen, RRT  Authorized by: Muna Washington APRN    Pre Drug % Predicted  FVC: 93%  FEV1: 80%  FEF 25-75%: 46%  FEV1/FVC: 68.56%  DLCO: 84%  D/VAsb: 101%    Interpretation  Spirometry  Spirometry shows mild obstruction.  Diffusion Capacity  The patient's diffusion capacity is normal.  Diffusion capacity is normal when corrected for alveolar volume.      Results for orders placed during the hospital encounter of 10/20/20    Full Pulmonary Function Test With Bronchodilator & ABG    [facility]  RESPIRATORY DISEASE CLINIC PULMONARY FUNCTION TEST REPORT    Flow-volume curve:  Flow-volume curve is obstructive in configuration.    Spirometry:  FVC was 4.84 L or 92% of predicted with 0% change after bronchodilator challenge.  FEV1 was 3.37 L  or 84% of predicted with 6% change after bronchodilator challenge.  FVC:FEV1 ratio was 70  Spirometry values are showing obstructive pattern    Lung volumes:  TLC was 7.55 L or 100% of predicted  RV was 2.72 L or 115% of predicted    Lung volumes are within normal limit.    Diffusing  capacity:  Corrected for velar volume was 79% of predicted    Diffusing capacity adjusted for single breath is moderately reduced.    Overall impression:    Above test results acceptable and reproducible by ATS criteria  Spirometry shows moderate obstruction  No bronchodilator response  Volumes are within normal limits without any hyperinflation or significant air trapping  Diffusion capacity is moderately decreased    There is no prior test results available for comparison.clinical correlation is indicated.    Gerhard Anderson MD,  [facility]  10/21/2020  11:39 CDT          Assessment and Plan   Diagnoses and all orders for this visit:    1. Multiple lung nodules (Primary)    2. Adenocarcinoma of left lung (HCC)    3. Former smoker    4. Stage 1 mild COPD by GOLD classification (HCC)    5. Bullous emphysema (HCC)        I have reviewed his CT results with him. He is agreeable to have a short term CT in 3 months to follow up on the increase in size the right upper lobe nodule. This nodule was noted to have no uptake on Pet from Sept 2020 and appears less solid.       Follow Up   No follow-ups on file.  Patient was given instructions and counseling regarding his condition or for health maintenance advice. Please see specific information pulled into the AVS if appropriate.     Muna Washington, APRN  9/22/2022  13:10 CDT

## 2022-09-22 ENCOUNTER — OFFICE VISIT (OUTPATIENT)
Dept: PULMONOLOGY | Facility: CLINIC | Age: 62
End: 2022-09-22

## 2022-09-22 VITALS
DIASTOLIC BLOOD PRESSURE: 66 MMHG | HEIGHT: 73 IN | WEIGHT: 210 LBS | SYSTOLIC BLOOD PRESSURE: 118 MMHG | HEART RATE: 77 BPM | OXYGEN SATURATION: 97 % | BODY MASS INDEX: 27.83 KG/M2

## 2022-09-22 DIAGNOSIS — J44.9 STAGE 1 MILD COPD BY GOLD CLASSIFICATION: ICD-10-CM

## 2022-09-22 DIAGNOSIS — J43.9 BULLOUS EMPHYSEMA: ICD-10-CM

## 2022-09-22 DIAGNOSIS — Z87.891 FORMER SMOKER: ICD-10-CM

## 2022-09-22 DIAGNOSIS — C34.92 ADENOCARCINOMA OF LEFT LUNG: ICD-10-CM

## 2022-09-22 DIAGNOSIS — R91.8 MULTIPLE LUNG NODULES: Primary | ICD-10-CM

## 2022-09-22 PROCEDURE — 99214 OFFICE O/P EST MOD 30 MIN: CPT | Performed by: NURSE PRACTITIONER

## 2022-09-22 RX ORDER — OMEPRAZOLE 40 MG/1
40 CAPSULE, DELAYED RELEASE ORAL EVERY MORNING
COMMUNITY
Start: 2022-08-19

## 2022-09-26 ENCOUNTER — OFFICE VISIT (OUTPATIENT)
Dept: NEUROLOGY | Age: 62
End: 2022-09-26
Payer: COMMERCIAL

## 2022-09-26 VITALS
HEIGHT: 72 IN | RESPIRATION RATE: 18 BRPM | DIASTOLIC BLOOD PRESSURE: 60 MMHG | SYSTOLIC BLOOD PRESSURE: 104 MMHG | BODY MASS INDEX: 28.44 KG/M2 | WEIGHT: 210 LBS | HEART RATE: 67 BPM

## 2022-09-26 DIAGNOSIS — G60.9 IDIOPATHIC PERIPHERAL NEUROPATHY: ICD-10-CM

## 2022-09-26 DIAGNOSIS — G44.021 INTRACTABLE CHRONIC CLUSTER HEADACHE: Primary | ICD-10-CM

## 2022-09-26 PROCEDURE — 99213 OFFICE O/P EST LOW 20 MIN: CPT | Performed by: PSYCHIATRY & NEUROLOGY

## 2022-09-26 NOTE — PROGRESS NOTES
[address] [facility]  [address] [place] [address], [place] [address]  Phone [phone]  Fax [phone] [address] [facility] Follow Up Encounter  22 9:58 AM CDT    Information:   Patient Name: Christina Montejo  :   1960  Age:   65 y.o. MRN:   [id number]  Account #:  [de-identified]  Today:  22    Provider: Fran Stringer M.D. Chief Complaint:   Chief Complaint   Patient presents with    Follow-up    Headache     Cluster headaches       Subjective:   Christina Montejo is a 65 y.o. man with a history of cluster headaches and peripheral neuropathy who is following up. The cluster headaches are doing well. He has only had about 5 this year. He takes butalbitaol or Imitrex SQ. The cluster headaches improved dramatically when he stopped smoking. He did have lung cancer and had a left upper lobectomy. He had a recent CT chest that showed a right upper log 1.6cm nodule that was larger than in the past.  He is having close follow up. He has had no numbness or tingling. The numbness and tingling in his feet resolved when he got new shoes. Objective:     Past Medical History:  Past Medical History:   Diagnosis Date    BPH (benign prostatic hyperplasia)     Carotid artery stenosis     Cluster headache, intractable     Hyperlipidemia     Hypertension     Malignant neoplasm of left lung (HCC)     Malignant neoplasm of left lung (HCC)     Migraine headache     Other malaise and fatigue        Past Surgical History:   Procedure Laterality Date    BACK SURGERY      COLONOSCOPY  03/15/2013    Pamela: adenomatous and hyperplastic polyps (5 yr)    HERNIA REPAIR      LOBECTOMY Left     upper lobe    [place] COLONOSCOPY FLX DX W/COLLJ SPEC WHEN PFRMD N/A 2018    Dr Abdelrahman Montenegro, 5 yr recall       Recent Hospitalizations  None    Significant Injuries  None    Habits  Christina Montejo reports that he quit smoking about 23 months ago. His smoking use included cigarettes. He has a 35.00 pack-year smoking history. He has never used smokeless tobacco. He reports that he does not drink alcohol and does not use drugs. Family History   Problem Relation Age of Onset    Cancer Mother         Lung CA    Cancer Father         Breast CA    Other Sister         aneurysm    Cancer Other 58    Cancer Other 59       Social History  Santiago Branch is , lives in [place], [place], and works at [employer]. Medications:  Current Outpatient Medications   Medication Sig Dispense Refill    omeprazole (PRILOSEC) 40 MG delayed release capsule Take 1 capsule by mouth every morning (before breakfast) 30 capsule 5    verapamil (CALAN SR) 240 MG extended release tablet TAKE 1 TABLET BY MOUTH TWICE DAILY 180 tablet 3    butalbital-acetaminophen-caffeine (FIORICET, ESGIC) -40 MG per tablet TAKE 1 TABLET BY MOUTH TWICE DAILY AS NEEDED FOR HEADACHE 30 tablet 3    sildenafil (VIAGRA) 50 MG tablet TAKE 1 TABLET BY MOUTH EVERY DAY AS NEEDED 12 tablet 3    rosuvastatin (CRESTOR) 10 MG tablet TAKE 1 TABLET BY MOUTH DAILY 90 tablet 3    methocarbamol (ROBAXIN) 500 MG tablet Take 500 mg by mouth every 8 hours as needed      traMADol (ULTRAM) 50 MG tablet Take 50 mg by mouth nightly as needed. SUMAtriptan (IMITREX) 100 MG tablet TAKE 1 TABLET BY MOUTH ONSET OF HEADACHE, MAY REPEAT IN 2 HOURS IF HEADACHE PERSISTS, MAX OF 2 TABLET IN 24 HOURS OR 4 TABLET WEEKLY 9 tablet 5    eletriptan (RELPAX) 40 MG tablet 1 PO PRN migraine. Can repeat in 2 hours. Maximum is 2 in 24 hours and 4 in a week. 12 tablet 5    Galcanezumab-gnlm (EMGALITY, 300 MG DOSE,) 100 MG/ML SOSY Inject 300 mg into the skin every 30 days 3 Syringe 5    SUMAtriptan Succinate 6 MG/0.5ML SOAJ INJECT 1 SYRINGE AT ONSET OF A HEADACHE, MAY REPEAT IN 2 HOURS IF HEADACHE PERSISTS.  MAX OF 2 IN 24 HOURS OR 4 WEEKLY 12 Cartridge 5    melatonin 3 MG TABS tablet Take 3 mg by mouth daily Indications: 2 PO QHS      aspirin 81 MG tablet Take 81 mg by mouth daily        No current facility-administered medications for this visit. Allergies: Allergies as of 09/26/2022    (No Known Allergies)       Review of Systems:  Constitutional: negative for - chills and fever  Eyes:  negative for - visual disturbance and photophobia  HENMT: positive for - headaches and sinus pain  Respiratory: negative for - cough, hemoptysis  positive for - shortness of breath  Cardiovascular: negative for - chest pain and palpitations  Gastrointestinal: negative for - blood in stools, constipation, diarrhea, nausea, and vomiting  Genito-Urinary: negative for - hematuria, urinary frequency, urinary urgency, and urinary retention  Musculoskeletal: positive for - joint pain, joint stiffness, and joint swelling  Hematological and Lymphatic: negative for - bleeding problems, abnormal bruising, and swollen lymph nodes  Endocrine:  negative for - polydipsia and polyphagia  Allergy/Immunology:  negative for - rhinorrhea, sinus congestion, hives  Integument:  negative for - negative for - rash, change in moles, new or changing lesions  Psychological: negative for - anxiety and depression  Neurological: negative for - memory loss, numbness/tingling, and weakness     PHYSICAL EXAMINATION:  Vitals:  /60   Pulse 67   Resp 18   Ht 6' (1.829 m)   Wt 210 lb (95.3 kg)   BMI 28.48 kg/m²   General appearance:  Alert, well developed, well nourished, in no distress  HEENT:  normocephalic, atraumatic, sclera appear normal, no nasal abnormalities, no rhinorrhea, Ears appear normal, oral mucous membranes are moist without erythema, trachea midline, thyroid is normal, no lymphadenopathy or neck mass. Cardiovascular:  Regular rate and rhythm without murmer. No peripheral edema, No cyanosis or clubbing. No carotid bruits. Pulmonary:  Lungs are clear to auscultation.   Breathing appears normal, good expansion, normal effort without use of accessory muscles  Musculoskeletal:  Joints are osteoarthritic  Integument:  No rash, erythema, or pallor  Psychiatric:  Mood, affect, and behavior appear normal      NEUROLOGIC EXAMINATION:  Mental Status:  alert, oriented to person, place, and time. Speech:  Clear without dysarthria or dysphonia  Language:  Fluent without aphasia  Cranial Nerves:   II Visual fields are full to confrontation   III,IV, VI Extraocular movements are full   VII Facial movements are symmetrical without weakness   VIII Hearing is intact   IX,X Shoulder shrug and head rotation strength are intact   XII No tongue atrophy or fasciculations. Normal tongue protrusion. No tongue weakness  Motor:  Normal strength in both upper and lower extremities. Normal muscle tone and bulk. Deep tendon reflexes are intact and symmetrical in both upper and lower extremities. Curtis's signs are absent bilaterally. There is no ankle clonus on either side. Sensation:  Sensation is intact to light touch, temperature, and vibration in all extremities. Coordination:  Rapid alternating movements are normal in both upper and lower extremities. Finger to nose testing is unimpaired bilaterally. Gait:  Normal station and gait. Pertinent Diagnostic Studies:  NICS 5/28/2022 showed no carotid stenosis  CT Chest Without Contrast Diagnostic    Anatomical Region Laterality Modality   Chest -- Computed Tomography     Impression      1. Increased size of 1.6 cm cystic pulmonary nodule in the RIGHT upper   lobe. This nodule is concerning for a primary lung neoplasm. Differential diagnosis also includes a chronic infectious or   inflammatory process. 2.  Multiple smaller subcentimeter solid and groundglass pulmonary   nodules in both lungs are unchanged. 3.  Postoperative change of LEFT upper lobectomy. Moderate emphysema. 4.  Small nonobstructing bilateral renal calculi.    This report was finalized on 09/20/2022 09:48 by Dr. Alanis Anne MD.  Narrative    EXAM/TECHNIQUE: CT chest without contrast     INDICATION: lung nodules; R91. 8-Other nonspecific abnormal finding of   lung field     COMPARISON: 8/27/2021     DLP: 430 mGy cm. Automated exposure control was also utilized to   decrease patient radiation dose. FINDINGS:     The central airways are clear. Postoperative change of LEFT upper   lobectomy. Decrease in trace LEFT pleural fluid. No right-sided pleural   effusion. Moderate centrilobular and paraseptal emphysema with RIGHT   apical parenchymal scarring. No consolidation. Increased size of 1.6 cm cystic nodule in the RIGHT upper lobe on image   62 (previously 1.0 cm). No change in 6 cm groundglass nodule in the   RIGHT lower lobe on image 102. No change in 6 mm RIGHT middle lobe   pulmonary nodule on image 113. No change in 4 mm RIGHT middle lobe   pulmonary nodule on image 90. No change in 7 mm groundglass pulmonary   nodule in the LEFT lower lobe on image 83. No enlarged thoracic lymph nodes. Main pulmonary artery is nondilated. Thoracic aorta is nonaneurysmal. Moderate to heavy coronary   atherosclerotic calcification. Trace pericardial fluid. Uniform thyroid. No acute chest wall soft tissue abnormality. Small   nonobstructing bilateral renal calculi. No acute osseous finding. Procedure Note    Daniela Ramos MD - 09/20/2022   Formatting of this note might be different from the original.   EXAM/TECHNIQUE: CT chest without contrast     INDICATION: lung nodules; R91.8-Other nonspecific abnormal finding of   lung field     COMPARISON: 8/27/2021     DLP: 430 mGy cm. Automated exposure control was also utilized to   decrease patient radiation dose. FINDINGS:     The central airways are clear. Postoperative change of LEFT upper   lobectomy. Decrease in trace LEFT pleural fluid. No right-sided pleural   effusion. Moderate centrilobular and paraseptal emphysema with RIGHT   apical parenchymal scarring. No consolidation.      Increased size of 1.6 cm cystic nodule in the RIGHT upper lobe on image   62 (previously 1.0 cm). No change in 6 cm groundglass nodule in the   RIGHT lower lobe on image 102. No change in 6 mm RIGHT middle lobe   pulmonary nodule on image 113. No change in 4 mm RIGHT middle lobe   pulmonary nodule on image 90. No change in 7 mm groundglass pulmonary   nodule in the LEFT lower lobe on image 83. No enlarged thoracic lymph nodes. Main pulmonary artery is nondilated. Thoracic aorta is nonaneurysmal. Moderate to heavy coronary   atherosclerotic calcification. Trace pericardial fluid. Uniform thyroid. No acute chest wall soft tissue abnormality. Small   nonobstructing bilateral renal calculi. No acute osseous finding. IMPRESSION:     1. Increased size of 1.6 cm cystic pulmonary nodule in the RIGHT upper   lobe. This nodule is concerning for a primary lung neoplasm. Differential diagnosis also includes a chronic infectious or   inflammatory process. 2.  Multiple smaller subcentimeter solid and groundglass pulmonary   nodules in both lungs are unchanged. 3.  Postoperative change of LEFT upper lobectomy. Moderate emphysema. 4.  Small nonobstructing bilateral renal calculi. This report was finalized on 09/20/2022 09:48 by Dr. Neli Hill MD.      Assessment:       ICD-10-CM    1. Intractable chronic cluster headache  G44.021       2. Idiopathic peripheral neuropathy  G60.9       His cluster headaches are doing well. His neuropathy symptoms have resolved. Plan:   1. Continue present medications  2. Follow up in a year.      Electronically signed by Kayla Engel MD on 9/26/22

## 2022-12-14 RX ORDER — CEFUROXIME AXETIL 250 MG/1
TABLET ORAL
Qty: 9 EACH | Refills: 2 | Status: SHIPPED | OUTPATIENT
Start: 2022-12-14

## 2022-12-16 ENCOUNTER — HOSPITAL ENCOUNTER (OUTPATIENT)
Dept: CT IMAGING | Facility: HOSPITAL | Age: 62
Discharge: HOME | End: 2022-12-16
Admitting: NURSE PRACTITIONER

## 2022-12-16 DIAGNOSIS — Z87.891 FORMER SMOKER: ICD-10-CM

## 2022-12-16 DIAGNOSIS — C34.92 ADENOCARCINOMA OF LEFT LUNG: ICD-10-CM

## 2022-12-16 DIAGNOSIS — R91.8 MULTIPLE LUNG NODULES: ICD-10-CM

## 2022-12-16 PROCEDURE — 71250 CT THORAX DX C-: CPT

## 2022-12-29 NOTE — PROGRESS NOTES
" DIONY Fermin  [facility]   Pulmonary and Critical Care  [address] [place] [address]  Phone: [phone]  Fax: [phone]           Chief Complaint  Multiple lung nodules and Adenocarcinoma of left lung    Subjective    History of Present Illness     Carlos Anaya presents to [facility] PULMONARY & CRITICAL CARE MEDICINE   History of Present Illness  Mr. Anaya is a pleasant 62 year old male patient of Dr. Jimbo Adam with known Adenocarcinoma of the left lung post left upper lobectomy by Dr. Morrow in Oct 2020. He was identified with a 7 mm spiculated nodule in the ABDOUL in Feb 2020. Former smoker (quit Oct 2020). Known emphysema and mild obstruction per last pulmonary function test. He is here today for follow up CT which has shown stability in multiple 4-7 mm nodules and increase in a Right upper lobe from 1.0 cm to 1.6 cm and less solid. Review of PET scan from Sept 2020 showed no uptake in this nodule. Repeat CT from Dec 16 showed no change in his nodules or the 1.6 cm cavitary nodule. He denies fever, chills, night sweats.  He denies hemoptysis.   He denies any unexplained weight loss.  He denies cough or shortness of breath. History of Covid in August.            Objective   Vital Signs:   /70   Pulse 82   Ht 185.4 cm (73\")   Wt 92.8 kg (204 lb 9.6 oz)   SpO2 96%   BMI 26.99 kg/m²     Physical Exam  Vitals reviewed.   Constitutional:       Appearance: Normal appearance.   Cardiovascular:      Rate and Rhythm: Normal rate and regular rhythm.   Pulmonary:      Effort: Pulmonary effort is normal.      Breath sounds: Normal breath sounds.   Neurological:      General: No focal deficit present.      Mental Status: He is alert and oriented to person, place, and time.   Psychiatric:         Mood and Affect: Mood normal.         Behavior: Behavior normal.          Result Review :  The following data was reviewed by: DIONY Fermin on " 12/30/2022:    Data reviewed: Radiologic studies CT Dec 2022   My interpretation of imaging:  As in HPI  My interpretation of labs: none     PFT Values        Some values may be hidden. Unless noted otherwise, only the newest values recorded on each date are displayed.         Old Values PFT Results 9/2/21   No data to display.      Pre Drug PFT Results 9/2/21   FVC 93   FEV1 80   FEF 25-75% 46   FEV1/FVC 68.56      Post Drug PFT Results 9/2/21   No data to display.      Other Tests PFT Results 9/2/21   DLCO 84   D/VAsb 101           My interpretation of the PFT: None     Results for orders placed in visit on 09/02/21    Pulmonary Function Test    Narrative  Pulmonary Function Test  Performed by: Ankita Bowen, RRT  Authorized by: Muna Washington APRN    Pre Drug % Predicted  FVC: 93%  FEV1: 80%  FEF 25-75%: 46%  FEV1/FVC: 68.56%  DLCO: 84%  D/VAsb: 101%    Interpretation  Spirometry  Spirometry shows mild obstruction.  Diffusion Capacity  The patient's diffusion capacity is normal.  Diffusion capacity is normal when corrected for alveolar volume.      Results for orders placed during the hospital encounter of 10/20/20    Full Pulmonary Function Test With Bronchodilator & ABG    [facility]  RESPIRATORY DISEASE CLINIC PULMONARY FUNCTION TEST REPORT    Flow-volume curve:  Flow-volume curve is obstructive in configuration.    Spirometry:  FVC was 4.84 L or 92% of predicted with 0% change after bronchodilator challenge.  FEV1 was 3.37 L  or 84% of predicted with 6% change after bronchodilator challenge.  FVC:FEV1 ratio was 70  Spirometry values are showing obstructive pattern    Lung volumes:  TLC was 7.55 L or 100% of predicted  RV was 2.72 L or 115% of predicted    Lung volumes are within normal limit.    Diffusing capacity:  Corrected for velar volume was 79% of predicted    Diffusing capacity adjusted for single breath is moderately reduced.    Overall impression:    Above test results acceptable and  reproducible by ATS criteria  Spirometry shows moderate obstruction  No bronchodilator response  Volumes are within normal limits without any hyperinflation or significant air trapping  Diffusion capacity is moderately decreased    There is no prior test results available for comparison.clinical correlation is indicated.    Gerhard Anderson MD,  [facility] Respiratory Disease [facility]  10/21/2020  11:39 CDT        Assessment and Plan   Diagnoses and all orders for this visit:    1. Multiple lung nodules (Primary)  -     CT Chest Without Contrast Diagnostic; Future    2. Adenocarcinoma of left lung (HCC)  -     CT Chest Without Contrast Diagnostic; Future    3. Former smoker    4. Stage 1 mild COPD by GOLD classification (HCC)    5. Bullous emphysema (HCC)        He feels like he is doing well and pleased with where he has come from his surgery 2 years ago. CT shows stability and again the last PET did not show any activity in the nodule. Will follow up short term with a CT in 6 months. Call for any concerns.         Follow Up   Return in about 6 months (around 6/30/2023) for CT.  Patient was given instructions and counseling regarding his condition or for health maintenance advice. Please see specific information pulled into the AVS if appropriate.     Muna Washington, DIONY  12/30/2022  13:29 CST

## 2022-12-30 ENCOUNTER — OFFICE VISIT (OUTPATIENT)
Dept: PULMONOLOGY | Facility: CLINIC | Age: 62
End: 2022-12-30

## 2022-12-30 VITALS
DIASTOLIC BLOOD PRESSURE: 70 MMHG | HEIGHT: 73 IN | HEART RATE: 82 BPM | BODY MASS INDEX: 27.11 KG/M2 | WEIGHT: 204.6 LBS | OXYGEN SATURATION: 96 % | SYSTOLIC BLOOD PRESSURE: 122 MMHG

## 2022-12-30 DIAGNOSIS — Z87.891 FORMER SMOKER: ICD-10-CM

## 2022-12-30 DIAGNOSIS — C34.92 ADENOCARCINOMA OF LEFT LUNG: ICD-10-CM

## 2022-12-30 DIAGNOSIS — J44.9 STAGE 1 MILD COPD BY GOLD CLASSIFICATION: Chronic | ICD-10-CM

## 2022-12-30 DIAGNOSIS — R91.8 MULTIPLE LUNG NODULES: Primary | ICD-10-CM

## 2022-12-30 DIAGNOSIS — J43.9 BULLOUS EMPHYSEMA: Chronic | ICD-10-CM

## 2022-12-30 PROCEDURE — 99214 OFFICE O/P EST MOD 30 MIN: CPT | Performed by: NURSE PRACTITIONER

## 2023-01-16 RX ORDER — ROSUVASTATIN CALCIUM 10 MG/1
10 TABLET, COATED ORAL DAILY
Qty: 90 TABLET | Refills: 3 | Status: SHIPPED | OUTPATIENT
Start: 2023-01-16

## 2023-06-27 ENCOUNTER — HOSPITAL ENCOUNTER (OUTPATIENT)
Dept: VASCULAR LAB | Age: 63
Discharge: HOME | End: 2023-06-27
Payer: COMMERCIAL

## 2023-06-27 ENCOUNTER — OFFICE VISIT (OUTPATIENT)
Dept: VASCULAR SURGERY | Age: 63
End: 2023-06-27
Payer: COMMERCIAL

## 2023-06-27 VITALS — HEART RATE: 54 BPM | SYSTOLIC BLOOD PRESSURE: 107 MMHG | DIASTOLIC BLOOD PRESSURE: 66 MMHG

## 2023-06-27 DIAGNOSIS — I65.23 BILATERAL CAROTID ARTERY STENOSIS: Primary | ICD-10-CM

## 2023-06-27 DIAGNOSIS — I65.23 BILATERAL CAROTID ARTERY STENOSIS: ICD-10-CM

## 2023-06-27 PROCEDURE — 93880 EXTRACRANIAL BILAT STUDY: CPT

## 2023-06-27 PROCEDURE — 99213 OFFICE O/P EST LOW 20 MIN: CPT | Performed by: NURSE PRACTITIONER

## 2023-08-13 DIAGNOSIS — G44.021 INTRACTABLE CHRONIC CLUSTER HEADACHE: ICD-10-CM

## 2023-08-14 RX ORDER — VERAPAMIL HYDROCHLORIDE 240 MG/1
TABLET, FILM COATED, EXTENDED RELEASE ORAL
Qty: 180 TABLET | Refills: 3 | Status: SHIPPED | OUTPATIENT
Start: 2023-08-14

## 2023-09-25 ENCOUNTER — OFFICE VISIT (OUTPATIENT)
Dept: NEUROLOGY | Age: 63
End: 2023-09-25
Payer: COMMERCIAL

## 2023-09-25 VITALS
HEART RATE: 60 BPM | BODY MASS INDEX: 27.83 KG/M2 | WEIGHT: 210 LBS | RESPIRATION RATE: 18 BRPM | SYSTOLIC BLOOD PRESSURE: 112 MMHG | DIASTOLIC BLOOD PRESSURE: 67 MMHG | HEIGHT: 73 IN

## 2023-09-25 DIAGNOSIS — G44.021 INTRACTABLE CHRONIC CLUSTER HEADACHE: Primary | ICD-10-CM

## 2023-09-25 DIAGNOSIS — Z79.899 ENCOUNTER FOR LONG-TERM (CURRENT) USE OF MEDICATIONS: ICD-10-CM

## 2023-09-25 PROCEDURE — 99213 OFFICE O/P EST LOW 20 MIN: CPT | Performed by: PSYCHIATRY & NEUROLOGY

## 2023-09-25 NOTE — PROGRESS NOTES
[facility]  [address] [facility], [address] [place] [address] [address]  [facility], [address] [place] [address]  Phone [phone]  Fax [phone]     [facility] Follow Up Encounter  23 9:14 AM CDT    Information:   Patient Name: Melly Doll  :   1960  Age:   59 y.o. MRN:   [id number]  Account #:  [de-identified]  Today:  23    Provider: Merlene Elmore M.D. Chief Complaint:   Chief Complaint   Patient presents with    Follow-up    Headache       Subjective:   Melly Doll is a 59 y.o. man with a history of intractable cluster headaches who is following up. Since stopping smoking, his headaches have done much better. He rarely has headaches. When he does they waken him from sleep. He no longer has multiple in a day/night. He says that he changed shoes and the tingling in his feet has resolved. He has had a left upper lobe pneumonectomy for lung cancer. He has a right lung nodule that is being watched. CT chest 2023 was stable. Objective:     Past Medical History:  Past Medical History:   Diagnosis Date    BPH (benign prostatic hyperplasia)     Carotid artery stenosis     Cluster headache, intractable     Hyperlipidemia     Hypertension     Malignant neoplasm of left lung (HCC)     Malignant neoplasm of left lung (HCC)     Migraine headache     Other malaise and fatigue        Past Surgical History:   Procedure Laterality Date    BACK SURGERY      COLONOSCOPY  03/15/2013    Pamela: adenomatous and hyperplastic polyps (5 yr)    HERNIA REPAIR      LOBECTOMY Left     upper lobe    [place] COLONOSCOPY FLX DX W/COLLJ SPEC WHEN PFRMD N/A 2018    Dr Janett Haney, 5 yr recall       Recent Hospitalizations  None    Significant Injuries  None    Habits  Melly Doll reports that he quit smoking about 2 years ago. His smoking use included cigarettes. He has a 35.00 pack-year smoking history. He has never used smokeless tobacco. He reports that he does not drink alcohol and does not use drugs.     Family History   Problem

## 2023-12-01 ENCOUNTER — TELEPHONE (OUTPATIENT)
Dept: PRIMARY CARE CLINIC | Age: 63
End: 2023-12-01

## 2023-12-01 DIAGNOSIS — N20.0 KIDNEY STONE ON RIGHT SIDE: Primary | ICD-10-CM

## 2023-12-01 RX ORDER — TAMSULOSIN HYDROCHLORIDE 0.4 MG/1
0.4 CAPSULE ORAL DAILY
Qty: 30 CAPSULE | Refills: 0 | Status: SHIPPED | OUTPATIENT
Start: 2023-12-01

## 2023-12-01 RX ORDER — HYDROCODONE BITARTRATE AND ACETAMINOPHEN 10; 325 MG/1; MG/1
1 TABLET ORAL EVERY 6 HOURS PRN
Qty: 12 TABLET | Refills: 0 | Status: SHIPPED | OUTPATIENT
Start: 2023-12-01 | End: 2023-12-04

## 2023-12-01 NOTE — TELEPHONE ENCOUNTER
Patient left message that he has a kidney stone and if GUILHERME Bonilla could send in any medication. Attempted to call patient back - received voicemail. Left message advising to be seen (if not seen already to confirm the kidney stone) for treatment.

## 2024-01-15 ENCOUNTER — HOSPITAL ENCOUNTER (OUTPATIENT)
Dept: CT IMAGING | Facility: HOSPITAL | Age: 64
Discharge: HOME | End: 2024-01-15
Admitting: NURSE PRACTITIONER
Payer: COMMERCIAL

## 2024-01-15 DIAGNOSIS — R91.8 MULTIPLE LUNG NODULES: ICD-10-CM

## 2024-01-15 PROCEDURE — 71250 CT THORAX DX C-: CPT

## 2024-01-15 RX ORDER — ROSUVASTATIN CALCIUM 10 MG/1
10 TABLET, COATED ORAL DAILY
Qty: 90 TABLET | Refills: 3 | OUTPATIENT
Start: 2024-01-15

## 2024-01-19 NOTE — PROGRESS NOTES
" Muna Bebe Washington APRCARON  [facility]   Pulmonary and Critical Care  [address] [place] [address]  Phone: [phone]  Fax: [phone]           Chief Complaint  Multiple lung nodules    Subjective    History of Present Illness     Carlos Anaya presents to [facility] PULMONARY & CRITICAL CARE MEDICINE   History of Present Illness  Mr. Anaya is a pleasant 89year old male patient of Dr. Jimbo Adam with known Adenocarcinoma of the left lung post left upper lobectomy by Dr. Morrow in Oct 2020. He was identified with a 7 mm spiculated nodule in the ABDOUL in Feb 2020. Former smoker (quit Oct 2020). Known emphysema and mild obstruction per last pulmonary function test. He denies fever, chills, night sweats. He denies hemoptysis.  He denies any unexplained weight loss.  He denies cough or shortness of breath. Ct in January showed stability compared to last CT 6 months ago. Multiple ground glass nodules. 1.6 cm RUL nodule stable, larger in some aspects to prior imaging but less solid in prior imaging, again stable on this recent imaging. No uptake on PET in 2020.  Flow-volume loop with diffusion capacity shows change compared to September 2021 and now has mild restriction where before he had mild obstruction.  Diffusion capacity remains normal.               Objective   Vital Signs:   /74   Pulse 68   Ht 185.4 cm (73\")   Wt 92 kg (202 lb 12.8 oz)   SpO2 98% Comment: RA  BMI 26.76 kg/m²     Physical Exam  Vitals reviewed.   Constitutional:       Appearance: Normal appearance.   Cardiovascular:      Rate and Rhythm: Normal rate and regular rhythm.   Pulmonary:      Effort: Pulmonary effort is normal.      Breath sounds: Normal breath sounds.   Neurological:      General: No focal deficit present.      Mental Status: He is alert and oriented to person, place, and time.   Psychiatric:         Mood and Affect: Mood normal.         Behavior: Behavior normal.          Result " Review :  The following data was reviewed by: DIONY Fermin on 01/22/2024:    Data reviewed : Radiologic studies CT chest 1/2024    My interpretation of imaging:  as in HPI  My interpretation of labs: none  CT Chest Without Contrast Diagnostic (01/15/2024 09:58)   PFT Values          1/22/2024    15:30   Pre Drug PFT Results   FVC 85   FEV1 81   FEF 25-75% 70   FEV1/FVC 72   Other Tests PFT Results   DLCO 73   D/VAsb 92     My interpretation of the PFT : as below     Results for orders placed in visit on 01/22/24    Pulmonary Function Test    Narrative  Pulmonary Function Test    Performed by: Ankita Bowen, TULIO  Authorized by: Muna Washington APRN  Pre Drug % Predicted  FVC: 85%  FEV1: 81%  FEF 25-75%: 70%  FEV1/FVC: 72%  DLCO: 73%  D/VAsb: 92%    Interpretation  Spirometry  Spirometry shows mild restriction.  Review of FVL curve  Patient's effort is normal.  Diffusion Capacity  The patient's diffusion capacity is normal.  Diffusion capacity is normal when corrected for alveolar volume.  Overall comments: Compared to September 2021 FEV1/FVC ratio has improved from 68% predicted to 72% predicted.  This is moved him from a mild obstruction to a mild restriction.  FEV1 remains stable at 81% predicted and there was a drop in the FVC from 93% predicted to 85% predicted.  Diffusion capacity remains normal when corrected for alveolar volume.      Results for orders placed in visit on 09/02/21    Pulmonary Function Test    Narrative  Pulmonary Function Test  Performed by: Ankita Bowen RRT  Authorized by: Muna Washington APRN    Pre Drug % Predicted  FVC: 93%  FEV1: 80%  FEF 25-75%: 46%  FEV1/FVC: 68.56%  DLCO: 84%  D/VAsb: 101%    Interpretation  Spirometry  Spirometry shows mild obstruction.  Diffusion Capacity  The patient's diffusion capacity is normal.  Diffusion capacity is normal when corrected for alveolar volume.      Results for orders placed during the hospital  encounter of 10/20/20    Full Pulmonary Function Test With Bronchodilator & ABG    [facility]  RESPIRATORY DISEASE CLINIC PULMONARY FUNCTION TEST REPORT    Flow-volume curve:  Flow-volume curve is obstructive in configuration.    Spirometry:  FVC was 4.84 L or 92% of predicted with 0% change after bronchodilator challenge.  FEV1 was 3.37 L  or 84% of predicted with 6% change after bronchodilator challenge.  FVC:FEV1 ratio was 70  Spirometry values are showing obstructive pattern    Lung volumes:  TLC was 7.55 L or 100% of predicted  RV was 2.72 L or 115% of predicted    Lung volumes are within normal limit.    Diffusing capacity:  Corrected for velar volume was 79% of predicted    Diffusing capacity adjusted for single breath is moderately reduced.    Overall impression:    Above test results acceptable and reproducible by ATS criteria  Spirometry shows moderate obstruction  No bronchodilator response  Volumes are within normal limits without any hyperinflation or significant air trapping  Diffusion capacity is moderately decreased    There is no prior test results available for comparison.clinical correlation is indicated.    Gerhard Anderson MD,  [facility]  10/21/2020  11:39 CDT          Assessment and Plan   Diagnoses and all orders for this visit:    1. Multiple lung nodules (Primary)    2. Personal history of nicotine dependence    3. Adenocarcinoma of left lung    4. Stage 1 mild COPD by GOLD classification  Assessment & Plan:  Now showing mild restriction on flow-volume loop with a FEV1 FVC ratio of 72% predicted and an FEV1 of 81% and FVC of 85% predicted.          5. Bullous emphysema        I have reviewed his flow-volume loop and diffusion capacity results with him and compared them to September 2021.  I have reviewed his imaging from his most recent CAT scan.  Overall he is stable.  He states his current job is more physically demanding.  He is able to do more  physically.  He will return to the office in 6 months with a CAT scan.        Follow Up   Return in about 6 months (around 7/22/2024) for CT.  Patient was given instructions and counseling regarding his condition or for health maintenance advice. Please see specific information pulled into the AVS if appropriate.     Muna Washington, APRCARON  1/22/2024  16:02 CST

## 2024-01-22 ENCOUNTER — PROCEDURE VISIT (OUTPATIENT)
Dept: PULMONOLOGY | Facility: CLINIC | Age: 64
End: 2024-01-22
Payer: COMMERCIAL

## 2024-01-22 ENCOUNTER — OFFICE VISIT (OUTPATIENT)
Dept: PULMONOLOGY | Facility: CLINIC | Age: 64
End: 2024-01-22
Payer: COMMERCIAL

## 2024-01-22 VITALS
WEIGHT: 202.8 LBS | SYSTOLIC BLOOD PRESSURE: 120 MMHG | OXYGEN SATURATION: 98 % | BODY MASS INDEX: 26.88 KG/M2 | DIASTOLIC BLOOD PRESSURE: 74 MMHG | HEART RATE: 68 BPM | HEIGHT: 73 IN

## 2024-01-22 DIAGNOSIS — J43.9 BULLOUS EMPHYSEMA: Chronic | ICD-10-CM

## 2024-01-22 DIAGNOSIS — C34.92 ADENOCARCINOMA OF LEFT LUNG: ICD-10-CM

## 2024-01-22 DIAGNOSIS — J43.9 BULLOUS EMPHYSEMA: ICD-10-CM

## 2024-01-22 DIAGNOSIS — J44.9 STAGE 1 MILD COPD BY GOLD CLASSIFICATION: ICD-10-CM

## 2024-01-22 DIAGNOSIS — J44.9 STAGE 1 MILD COPD BY GOLD CLASSIFICATION: Chronic | ICD-10-CM

## 2024-01-22 DIAGNOSIS — R91.8 MULTIPLE LUNG NODULES: Primary | Chronic | ICD-10-CM

## 2024-01-22 DIAGNOSIS — Z87.891 PERSONAL HISTORY OF NICOTINE DEPENDENCE: ICD-10-CM

## 2024-01-22 PROCEDURE — 94729 DIFFUSING CAPACITY: CPT | Performed by: NURSE PRACTITIONER

## 2024-01-22 PROCEDURE — 99214 OFFICE O/P EST MOD 30 MIN: CPT | Performed by: NURSE PRACTITIONER

## 2024-01-22 PROCEDURE — 94375 RESPIRATORY FLOW VOLUME LOOP: CPT | Performed by: NURSE PRACTITIONER

## 2024-01-22 RX ORDER — TAMSULOSIN HYDROCHLORIDE 0.4 MG/1
1 CAPSULE ORAL DAILY
COMMUNITY
Start: 2023-12-01

## 2024-01-22 NOTE — PROCEDURES
Pulmonary Function Test    Performed by: Ankita Bowen, RRT  Authorized by: Muna Washington APRN     Pre Drug % Predicted    FVC: 85%   FEV1: 81%   FEF 25-75%: 70%   FEV1/FVC: 72%   DLCO: 73%   D/VAsb: 92%    Interpretation   Spirometry   Spirometry shows mild restriction.   Review of FVL curve   Patient's effort is normal.   Diffusion Capacity  The patient's diffusion capacity is normal.  Diffusion capacity is normal when corrected for alveolar volume.   Overall comments: Compared to September 2021 FEV1/FVC ratio has improved from 68% predicted to 72% predicted.  This is moved him from a mild obstruction to a mild restriction.  FEV1 remains stable at 81% predicted and there was a drop in the FVC from 93% predicted to 85% predicted.  Diffusion capacity remains normal when corrected for alveolar volume.

## 2024-01-22 NOTE — ASSESSMENT & PLAN NOTE
Now showing mild restriction on flow-volume loop with a FEV1 FVC ratio of 72% predicted and an FEV1 of 81% and FVC of 85% predicted.

## 2024-01-25 RX ORDER — ROSUVASTATIN CALCIUM 10 MG/1
10 TABLET, COATED ORAL DAILY
Qty: 90 TABLET | Refills: 3 | OUTPATIENT
Start: 2024-01-25

## 2024-01-29 RX ORDER — ROSUVASTATIN CALCIUM 10 MG/1
10 TABLET, COATED ORAL DAILY
Qty: 90 TABLET | Refills: 3 | OUTPATIENT
Start: 2024-01-29

## 2024-02-01 ENCOUNTER — OFFICE VISIT (OUTPATIENT)
Dept: PRIMARY CARE CLINIC | Age: 64
End: 2024-02-01
Payer: COMMERCIAL

## 2024-02-01 VITALS
DIASTOLIC BLOOD PRESSURE: 62 MMHG | HEIGHT: 73 IN | HEART RATE: 68 BPM | TEMPERATURE: 97.8 F | WEIGHT: 203.6 LBS | OXYGEN SATURATION: 97 % | RESPIRATION RATE: 16 BRPM | BODY MASS INDEX: 26.98 KG/M2 | SYSTOLIC BLOOD PRESSURE: 110 MMHG

## 2024-02-01 DIAGNOSIS — Z00.00 WELL ADULT EXAM: Primary | ICD-10-CM

## 2024-02-01 DIAGNOSIS — Z12.5 SCREENING PSA (PROSTATE SPECIFIC ANTIGEN): ICD-10-CM

## 2024-02-01 DIAGNOSIS — Z13.220 ENCOUNTER FOR SCREENING FOR LIPID DISORDER: ICD-10-CM

## 2024-02-01 DIAGNOSIS — Z13.1 SCREENING FOR DIABETES MELLITUS: ICD-10-CM

## 2024-02-01 LAB
ALBUMIN SERPL-MCNC: 4.2 G/DL (ref 3.5–5.2)
ALP SERPL-CCNC: 111 U/L (ref 40–130)
ALT SERPL-CCNC: 12 U/L (ref 5–41)
ANION GAP SERPL CALCULATED.3IONS-SCNC: 11 MMOL/L (ref 7–19)
AST SERPL-CCNC: 15 U/L (ref 5–40)
BILIRUB SERPL-MCNC: 0.4 MG/DL (ref 0.2–1.2)
BUN SERPL-MCNC: 11 MG/DL (ref 8–23)
CALCIUM SERPL-MCNC: 9 MG/DL (ref 8.8–10.2)
CHLORIDE SERPL-SCNC: 104 MMOL/L (ref 98–111)
CHOLEST SERPL-MCNC: 149 MG/DL (ref 160–199)
CO2 SERPL-SCNC: 25 MMOL/L (ref 22–29)
CREAT SERPL-MCNC: 1 MG/DL (ref 0.5–1.2)
ERYTHROCYTE [DISTWIDTH] IN BLOOD BY AUTOMATED COUNT: 13.9 % (ref 11.5–14.5)
GLUCOSE SERPL-MCNC: 90 MG/DL (ref 74–109)
HCT VFR BLD AUTO: 44.4 % (ref 42–52)
HDLC SERPL-MCNC: 29 MG/DL (ref 55–121)
HGB BLD-MCNC: 14.8 G/DL (ref 14–18)
LDLC SERPL CALC-MCNC: 98 MG/DL
MCH RBC QN AUTO: 32 PG (ref 27–31)
MCHC RBC AUTO-ENTMCNC: 33.3 G/DL (ref 33–37)
MCV RBC AUTO: 95.9 FL (ref 80–94)
PLATELET # BLD AUTO: 256 K/UL (ref 130–400)
PMV BLD AUTO: 9.7 FL (ref 9.4–12.4)
POTASSIUM SERPL-SCNC: 4.5 MMOL/L (ref 3.5–5)
PROT SERPL-MCNC: 7.1 G/DL (ref 6.6–8.7)
PSA SERPL-MCNC: 0.65 NG/ML (ref 0–4)
RBC # BLD AUTO: 4.63 M/UL (ref 4.7–6.1)
SODIUM SERPL-SCNC: 140 MMOL/L (ref 136–145)
TRIGL SERPL-MCNC: 110 MG/DL (ref 0–149)
WBC # BLD AUTO: 9.3 K/UL (ref 4.8–10.8)

## 2024-02-01 PROCEDURE — 99396 PREV VISIT EST AGE 40-64: CPT | Performed by: NURSE PRACTITIONER

## 2024-02-01 RX ORDER — HYDROCODONE BITARTRATE AND ACETAMINOPHEN 10; 325 MG/1; MG/1
1 TABLET ORAL EVERY 6 HOURS PRN
COMMUNITY
Start: 2023-12-04 | End: 2024-02-01 | Stop reason: ALTCHOICE

## 2024-02-01 RX ORDER — ROSUVASTATIN CALCIUM 10 MG/1
10 TABLET, COATED ORAL DAILY
Qty: 90 TABLET | Refills: 3 | Status: SHIPPED | OUTPATIENT
Start: 2024-02-01

## 2024-02-01 SDOH — ECONOMIC STABILITY: FOOD INSECURITY: WITHIN THE PAST 12 MONTHS, YOU WORRIED THAT YOUR FOOD WOULD RUN OUT BEFORE YOU GOT MONEY TO BUY MORE.: NEVER TRUE

## 2024-02-01 SDOH — ECONOMIC STABILITY: INCOME INSECURITY: HOW HARD IS IT FOR YOU TO PAY FOR THE VERY BASICS LIKE FOOD, HOUSING, MEDICAL CARE, AND HEATING?: NOT HARD AT ALL

## 2024-02-01 SDOH — ECONOMIC STABILITY: FOOD INSECURITY: WITHIN THE PAST 12 MONTHS, THE FOOD YOU BOUGHT JUST DIDN'T LAST AND YOU DIDN'T HAVE MONEY TO GET MORE.: NEVER TRUE

## 2024-02-01 SDOH — ECONOMIC STABILITY: HOUSING INSECURITY
IN THE LAST 12 MONTHS, WAS THERE A TIME WHEN YOU DID NOT HAVE A STEADY PLACE TO SLEEP OR SLEPT IN A SHELTER (INCLUDING NOW)?: NO

## 2024-02-01 ASSESSMENT — PATIENT HEALTH QUESTIONNAIRE - PHQ9
SUM OF ALL RESPONSES TO PHQ QUESTIONS 1-9: 0
2. FEELING DOWN, DEPRESSED OR HOPELESS: 0
SUM OF ALL RESPONSES TO PHQ QUESTIONS 1-9: 0
SUM OF ALL RESPONSES TO PHQ9 QUESTIONS 1 & 2: 0
1. LITTLE INTEREST OR PLEASURE IN DOING THINGS: 0

## 2024-02-01 ASSESSMENT — ENCOUNTER SYMPTOMS
EYES NEGATIVE: 1
RESPIRATORY NEGATIVE: 1
ALLERGIC/IMMUNOLOGIC NEGATIVE: 1
GASTROINTESTINAL NEGATIVE: 1

## 2024-02-01 NOTE — PROGRESS NOTES
[facility]  [address] [place] [address]  Dept: [phone]  Dept Fax: [phone]  Loc: [phone]    Petar Liu is a 63 y.o. male who presents today for his medical conditions/complaints as noted below.  Petar Liu is c/o of Annual Exam (Pt is fasting, no issues or concerns)        HPI:     HPI   Chief Complaint   Patient presents with    Annual Exam     Pt is fasting, no issues or concerns   He is doing well he started a new job and having to work nights.  He does have a history of lung cancer and he is followed by the pulmonologist he did see them recently and had a CT scan of his lungs that shows a stable nodule.   he is no longer smoking he quit when he found out he had cancer.  He wants to wait till he has been on his job a little bit longer before he gets his colonoscopy he knows that he is due.  He feels good in general.  He is fasting for blood work today.  Past Medical History:   Diagnosis Date    BPH (benign prostatic hyperplasia)     Carotid artery stenosis     Cluster headache, intractable     Hyperlipidemia     Hypertension     Kidney stone     Malignant neoplasm of left lung (HCC)     Malignant neoplasm of left lung (HCC)     Migraine headache     Other malaise and fatigue       Past Surgical History:   Procedure Laterality Date    BACK SURGERY      COLONOSCOPY  03/15/2013    Pamela: adenomatous and hyperplastic polyps (5 yr)    HERNIA REPAIR      LOBECTOMY Left     upper lobe    MD COLONOSCOPY FLX DX W/COLLJ SPEC WHEN PFRMD N/A 04/20/2018    Dr Santiago-normal, 5 yr recall           2/1/2024     3:53 PM 9/25/2023     9:22 AM 9/25/2023     9:05 AM 6/27/2023     2:11 PM 9/26/2022     9:40 AM 8/25/2022     2:04 PM   Vitals   SYSTOLIC 110  112 107 104 116   DIASTOLIC 62  67 66 60 72   Site      Left Upper Arm   Position      Sitting   Cuff Size      Medium Adult   Pulse 68 60 20 54 67 66   Temp 97.8 °F (36.6 °C)     97.6 °F (36.4 °C)

## 2024-04-08 DIAGNOSIS — Z00.00 WELL ADULT EXAM: ICD-10-CM

## 2024-04-09 RX ORDER — OMEPRAZOLE 40 MG/1
CAPSULE, DELAYED RELEASE ORAL
Qty: 90 CAPSULE | Refills: 1 | Status: SHIPPED | OUTPATIENT
Start: 2024-04-09

## 2024-07-01 ENCOUNTER — TELEPHONE (OUTPATIENT)
Dept: VASCULAR SURGERY | Age: 64
End: 2024-07-01

## 2024-07-01 ENCOUNTER — TRANSCRIBE ORDERS (OUTPATIENT)
Dept: ADMINISTRATIVE | Age: 64
End: 2024-07-01

## 2024-07-01 DIAGNOSIS — I65.23 BILATERAL CAROTID ARTERY STENOSIS: Primary | ICD-10-CM

## 2024-07-01 DIAGNOSIS — Z01.818 PRE-OP EXAM: Primary | ICD-10-CM

## 2024-07-22 ENCOUNTER — HOSPITAL ENCOUNTER (OUTPATIENT)
Dept: CT IMAGING | Facility: HOSPITAL | Age: 64
Discharge: HOME | End: 2024-07-22
Admitting: NURSE PRACTITIONER
Payer: COMMERCIAL

## 2024-07-22 DIAGNOSIS — R91.8 MULTIPLE LUNG NODULES: Chronic | ICD-10-CM

## 2024-07-22 DIAGNOSIS — J43.9 BULLOUS EMPHYSEMA: Chronic | ICD-10-CM

## 2024-07-22 DIAGNOSIS — Z87.891 PERSONAL HISTORY OF NICOTINE DEPENDENCE: ICD-10-CM

## 2024-07-22 DIAGNOSIS — C34.92 ADENOCARCINOMA OF LEFT LUNG: ICD-10-CM

## 2024-07-22 PROCEDURE — 71250 CT THORAX DX C-: CPT

## 2024-07-23 ENCOUNTER — TELEPHONE (OUTPATIENT)
Dept: PULMONOLOGY | Facility: CLINIC | Age: 64
End: 2024-07-23
Payer: COMMERCIAL

## 2024-07-23 NOTE — TELEPHONE ENCOUNTER
----- Message from Muna Washington sent at 7/22/2024  5:12 PM CDT -----  Please let patient know that according to the report he has 1 new tiny 4 mm nodule in the right side other areas are stable and we will discuss further at his follow-up appointment.

## 2024-07-23 NOTE — TELEPHONE ENCOUNTER
Muna Washington, DIONY Laws, Neo, MILAGRO  Please let patient know that according to the report he has 1 new tiny 4 mm nodule in the right side other areas are stable and we will discuss further at his follow-up appointment.

## 2024-07-27 NOTE — PROGRESS NOTES
" DIONY Fermin  [facility]   Pulmonary and Critical Care  [address]  [place] [address]  Phone: [phone]  Fax: [phone]           Chief Complaint  Left upper lobe pulmonary nodule    Subjective    History of Present Illness     Carlos Anaya presents to [facility] PULMONARY & CRITICAL CARE MEDICINE   History of Present Illness  Mr. Anaya is a pleasant 63 year old male patient of Dr. Jimbo Adam with known Adenocarcinoma of the left lung post left upper lobectomy by Dr. Morrow in Oct 2020. He was identified with a 7 mm spiculated nodule in the ABDOUL in Feb 2020. Former smoker (quit Oct 2020). Known emphysema and mild restriction per last pulmonary function test. He denies fever, chills, night sweats. He denies hemoptysis.  He denies any unexplained weight loss.  He denies cough or shortness of breath. CT of the chest showed a new 4 mm medial right apical lung nodule.  Remaining scattered subcentimeter solid and groundglass nodules remain stable.  1.6 cm cavitary nodular opacity within the right upper lobe remains unchanged back to December 16, 2022.  Right apical parenchymal scarring as well as scarring in the lung bases.              Objective   Vital Signs:   /74   Pulse 62   Ht 185.4 cm (73\")   Wt 94.8 kg (209 lb)   SpO2 97% Comment: RA  BMI 27.57 kg/m²     Physical Exam  Vitals reviewed.   Constitutional:       Appearance: Normal appearance.   Cardiovascular:      Rate and Rhythm: Normal rate and regular rhythm.   Pulmonary:      Effort: Pulmonary effort is normal.      Breath sounds: Normal breath sounds.   Neurological:      General: No focal deficit present.      Mental Status: He is alert and oriented to person, place, and time.   Psychiatric:         Mood and Affect: Mood normal.         Behavior: Behavior normal.          Result Review :  The following data was reviewed by: DIONY Fermin on 07/29/2024:    Data reviewed : " Radiologic studies CT chest     My interpretation of imaging:  as in HPI  My interpretation of labs: no new  CT Chest Without Contrast Diagnostic (07/22/2024 13:47)   PFT Values          1/22/2024    15:30   Pre Drug PFT Results   FVC 85   FEV1 81   FEF 25-75% 70   FEV1/FVC 72   Other Tests PFT Results   DLCO 73   D/VAsb 92     My interpretation of the PFT : no new     Results for orders placed in visit on 01/22/24    Pulmonary Function Test    Narrative  Pulmonary Function Test    Performed by: Ankita Bowen, RRT  Authorized by: Muna Washington APRN  Pre Drug % Predicted  FVC: 85%  FEV1: 81%  FEF 25-75%: 70%  FEV1/FVC: 72%  DLCO: 73%  D/VAsb: 92%    Interpretation  Spirometry  Spirometry shows mild restriction.  Review of FVL curve  Patient's effort is normal.  Diffusion Capacity  The patient's diffusion capacity is normal.  Diffusion capacity is normal when corrected for alveolar volume.  Overall comments: Compared to September 2021 FEV1/FVC ratio has improved from 68% predicted to 72% predicted.  This is moved him from a mild obstruction to a mild restriction.  FEV1 remains stable at 81% predicted and there was a drop in the FVC from 93% predicted to 85% predicted.  Diffusion capacity remains normal when corrected for alveolar volume.      Results for orders placed in visit on 09/02/21    Pulmonary Function Test    Narrative  Pulmonary Function Test  Performed by: Ankita Bowen, TULIO  Authorized by: Muna Washington APRN    Pre Drug % Predicted  FVC: 93%  FEV1: 80%  FEF 25-75%: 46%  FEV1/FVC: 68.56%  DLCO: 84%  D/VAsb: 101%    Interpretation  Spirometry  Spirometry shows mild obstruction.  Diffusion Capacity  The patient's diffusion capacity is normal.  Diffusion capacity is normal when corrected for alveolar volume.      Results for orders placed during the hospital encounter of 10/20/20    Full Pulmonary Function Test With Bronchodilator & ABG    [facility]  RESPIRATORY DISEASE CLINIC  PULMONARY FUNCTION TEST REPORT    Flow-volume curve:  Flow-volume curve is obstructive in configuration.    Spirometry:  FVC was 4.84 L or 92% of predicted with 0% change after bronchodilator challenge.  FEV1 was 3.37 L  or 84% of predicted with 6% change after bronchodilator challenge.  FVC:FEV1 ratio was 70  Spirometry values are showing obstructive pattern    Lung volumes:  TLC was 7.55 L or 100% of predicted  RV was 2.72 L or 115% of predicted    Lung volumes are within normal limit.    Diffusing capacity:  Corrected for velar volume was 79% of predicted    Diffusing capacity adjusted for single breath is moderately reduced.    Overall impression:    Above test results acceptable and reproducible by ATS criteria  Spirometry shows moderate obstruction  No bronchodilator response  Volumes are within normal limits without any hyperinflation or significant air trapping  Diffusion capacity is moderately decreased    There is no prior test results available for comparison.clinical correlation is indicated.    Gerhard Anderson MD,  [facility] Respiratory Disease [facility]  10/21/2020  11:39 CDT          Assessment and Plan   Diagnoses and all orders for this visit:    1. Adenocarcinoma of left lung (Primary)    2. Stage 1 mild COPD by GOLD classification    3. Bullous emphysema    4. Multiple lung nodules    5. Personal history of nicotine dependence      Overall he is stable from a pulmonary standpoint.  We have reviewed his current imaging.  New 4 mm nodule that we will continue to watch as well.  He is agreeable to return in 6 months with a repeat CT scan.    Alpha 1: will discuss at follow up visit   LDCT: getting routine imaging for nodules/ known lung cancer   Smoking Cessation: Former, quit 2020          Follow Up   Return in about 6 months (around 1/29/2025) for CT.  Patient was given instructions and counseling regarding his condition or for health maintenance advice. Please see specific  information pulled into the AVS if appropriate.     Muna Washington, DIONY  7/29/2024  17:57 CDT

## 2024-07-29 ENCOUNTER — OFFICE VISIT (OUTPATIENT)
Dept: PULMONOLOGY | Facility: CLINIC | Age: 64
End: 2024-07-29
Payer: COMMERCIAL

## 2024-07-29 VITALS
WEIGHT: 209 LBS | HEIGHT: 73 IN | BODY MASS INDEX: 27.7 KG/M2 | DIASTOLIC BLOOD PRESSURE: 74 MMHG | OXYGEN SATURATION: 97 % | HEART RATE: 62 BPM | SYSTOLIC BLOOD PRESSURE: 122 MMHG

## 2024-07-29 DIAGNOSIS — R91.8 MULTIPLE LUNG NODULES: Chronic | ICD-10-CM

## 2024-07-29 DIAGNOSIS — J44.9 STAGE 1 MILD COPD BY GOLD CLASSIFICATION: Chronic | ICD-10-CM

## 2024-07-29 DIAGNOSIS — J43.9 BULLOUS EMPHYSEMA: Chronic | ICD-10-CM

## 2024-07-29 DIAGNOSIS — C34.92 ADENOCARCINOMA OF LEFT LUNG: Primary | ICD-10-CM

## 2024-07-29 DIAGNOSIS — Z87.891 PERSONAL HISTORY OF NICOTINE DEPENDENCE: ICD-10-CM

## 2024-07-29 PROCEDURE — 99214 OFFICE O/P EST MOD 30 MIN: CPT | Performed by: NURSE PRACTITIONER

## 2024-07-30 ENCOUNTER — HOSPITAL ENCOUNTER (OUTPATIENT)
Dept: VASCULAR LAB | Age: 64
Discharge: HOME | End: 2024-08-01
Payer: COMMERCIAL

## 2024-07-30 DIAGNOSIS — I65.23 BILATERAL CAROTID ARTERY STENOSIS: ICD-10-CM

## 2024-07-30 PROCEDURE — 93880 EXTRACRANIAL BILAT STUDY: CPT

## 2024-07-31 LAB
VAS LEFT CCA MID EDV: 19.9 CM/S
VAS LEFT CCA MID PSV: 112 CM/S
VAS LEFT CCA PROX EDV: 18.9 CM/S
VAS LEFT CCA PROX PSV: 122 CM/S
VAS LEFT ECA EDV: 17.5 CM/S
VAS LEFT ECA PSV: 120 CM/S
VAS LEFT ICA DIST EDV: 23.3 CM/S
VAS LEFT ICA DIST PSV: 70.7 CM/S
VAS LEFT ICA MID EDV: 25.5 CM/S
VAS LEFT ICA MID PSV: 75.1 CM/S
VAS LEFT ICA PROX EDV: 18.7 CM/S
VAS LEFT ICA PROX PSV: 86.7 CM/S
VAS LEFT VERTEBRAL EDV: 11.4 CM/S
VAS LEFT VERTEBRAL PSV: 49.4 CM/S
VAS RIGHT CCA MID EDV: 25.2 CM/S
VAS RIGHT CCA MID PSV: 106 CM/S
VAS RIGHT CCA PROX EDV: 23.1 CM/S
VAS RIGHT CCA PROX PSV: 123 CM/S
VAS RIGHT ECA EDV: 19.6 CM/S
VAS RIGHT ECA PSV: 138 CM/S
VAS RIGHT ICA DIST EDV: 27 CM/S
VAS RIGHT ICA DIST PSV: 77.2 CM/S
VAS RIGHT ICA MID EDV: 26 CM/S
VAS RIGHT ICA MID PSV: 76.7 CM/S
VAS RIGHT ICA PROX EDV: 26.1 CM/S
VAS RIGHT ICA PROX PSV: 87.6 CM/S
VAS RIGHT VERTEBRAL EDV: 10.2 CM/S
VAS RIGHT VERTEBRAL PSV: 38.9 CM/S

## 2024-07-31 PROCEDURE — 93880 EXTRACRANIAL BILAT STUDY: CPT | Performed by: SURGERY

## 2024-08-06 ENCOUNTER — OFFICE VISIT (OUTPATIENT)
Dept: VASCULAR SURGERY | Age: 64
End: 2024-08-06
Payer: COMMERCIAL

## 2024-08-06 VITALS
OXYGEN SATURATION: 97 % | HEART RATE: 60 BPM | DIASTOLIC BLOOD PRESSURE: 62 MMHG | TEMPERATURE: 98.8 F | SYSTOLIC BLOOD PRESSURE: 111 MMHG

## 2024-08-06 DIAGNOSIS — I65.23 BILATERAL CAROTID ARTERY STENOSIS: Primary | ICD-10-CM

## 2024-08-06 PROCEDURE — 99214 OFFICE O/P EST MOD 30 MIN: CPT | Performed by: NURSE PRACTITIONER

## 2024-08-07 NOTE — PROGRESS NOTES
Petar Liu (:  1960) is a 64 y.o. male,Established patient, here for evaluation of the following chief complaint(s):  Follow-up            SUBJECTIVE/OBJECTIVE:  He presents for follow up of carotid artery stenosis.  He has a known history of carotid artery stenosis for 1 - 5 years. His current treatment includes asa and crestor.He denies a history of CVA.  He reports has not had TIA's, episodes of lateralizing weakness and episodes of amaurosis fugax.    I have personally reviewed the following: problem list, current meds, allergies, PMH, PSH, family hx, and social hx  Petar Liu is a 64 y.o. male with the following history as recorded in [facility]:  Patient Active Problem List    Diagnosis Date Noted    Malignant neoplasm of left lung (HCC)     History of adenomatous polyp of colon 2018    Cluster headache, intractable     Intractable chronic cluster headache 2016    Migraine headache     Carotid artery stenosis     Colon cancer screening 2013    Hyperlipidemia 2013    Bilateral carotid artery stenosis 2013    BPH (benign prostatic hyperplasia) 2013    Migraines 2013    Nicotine dependence 2013     Current Outpatient Medications   Medication Sig Dispense Refill    omeprazole (PRILOSEC) 40 MG delayed release capsule TAKE 1 CAPSULE BY MOUTH EVERY MORNING BEFORE BREAKFAST 90 capsule 1    rosuvastatin (CRESTOR) 10 MG tablet Take 1 tablet by mouth daily 90 tablet 3    verapamil (CALAN SR) 240 MG extended release tablet TAKE 1 TABLET BY MOUTH TWICE DAILY 180 tablet 3    SUMAtriptan Succinate 6 MG/0.5ML SOAJ INJECT 1 SYRINGE AT ONSET OF A HEADACHE, MAY REPEAT IN 2 HOURS IF HEADACHE PERSISTS. MAX OF 2 IN 24 HOURS OR 4 WEEKLY 9 each 2    butalbital-acetaminophen-caffeine (FIORICET, ESGIC) -40 MG per tablet TAKE 1 TABLET BY MOUTH TWICE DAILY AS NEEDED FOR HEADACHE 30 tablet 3    sildenafil (VIAGRA) 50 MG tablet TAKE 1 TABLET BY MOUTH EVERY DAY AS NEEDED 12

## 2024-08-11 DIAGNOSIS — G44.021 INTRACTABLE CHRONIC CLUSTER HEADACHE: ICD-10-CM

## 2024-08-12 RX ORDER — VERAPAMIL HYDROCHLORIDE 240 MG/1
TABLET, FILM COATED, EXTENDED RELEASE ORAL
Qty: 180 TABLET | Refills: 2 | Status: SHIPPED | OUTPATIENT
Start: 2024-08-12

## 2024-08-15 DIAGNOSIS — I65.23 BILATERAL CAROTID ARTERY STENOSIS: Primary | ICD-10-CM

## 2024-09-30 ENCOUNTER — OFFICE VISIT (OUTPATIENT)
Dept: NEUROLOGY | Age: 64
End: 2024-09-30
Payer: COMMERCIAL

## 2024-09-30 VITALS
HEART RATE: 63 BPM | BODY MASS INDEX: 27.83 KG/M2 | WEIGHT: 210 LBS | DIASTOLIC BLOOD PRESSURE: 60 MMHG | SYSTOLIC BLOOD PRESSURE: 110 MMHG | RESPIRATION RATE: 16 BRPM | HEIGHT: 73 IN

## 2024-09-30 DIAGNOSIS — G44.021 INTRACTABLE CHRONIC CLUSTER HEADACHE: ICD-10-CM

## 2024-09-30 DIAGNOSIS — Z79.899 ENCOUNTER FOR LONG-TERM (CURRENT) USE OF HIGH-RISK MEDICATION: Primary | ICD-10-CM

## 2024-09-30 LAB
ALCOHOL URINE: NORMAL
AMPHETAMINE SCREEN URINE: NORMAL
BARBITURATE SCREEN URINE: NORMAL
BENZODIAZEPINE SCREEN, URINE: NORMAL
BUPRENORPHINE URINE: NORMAL
COCAINE METABOLITE SCREEN URINE: NORMAL
FENTANYL SCREEN, URINE: NORMAL
GABAPENTIN SCREEN, URINE: NORMAL
MDMA, URINE: NORMAL
METHADONE SCREEN, URINE: NORMAL
METHAMPHETAMINE, URINE: NORMAL
OPIATE SCREEN URINE: NORMAL
OXYCODONE SCREEN URINE: NORMAL
PHENCYCLIDINE SCREEN URINE: NORMAL
PROPOXYPHENE SCREEN, URINE: NORMAL
SYNTHETIC CANNABINOIDS(K2) SCREEN, URINE: NORMAL
THC SCREEN, URINE: NORMAL
TRAMADOL SCREEN URINE: NORMAL
TRICYCLIC ANTIDEPRESSANTS, UR: NORMAL

## 2024-09-30 PROCEDURE — 80305 DRUG TEST PRSMV DIR OPT OBS: CPT | Performed by: PSYCHIATRY & NEUROLOGY

## 2024-09-30 PROCEDURE — 99213 OFFICE O/P EST LOW 20 MIN: CPT | Performed by: PSYCHIATRY & NEUROLOGY

## 2024-09-30 NOTE — PROGRESS NOTES
[facility]  [address], [address]  [place] [address]  Phone [phone]  Fax [phone]     [facility] Follow Up Encounter  24 9:18 AM CDT    Information:   Patient Name: Petar Liu  :   1960  Age:   64 y.o.  MRN:   [id number]  Account #:  [id number]  Today:  24    Provider: Jeff Sylvester M.D.     Chief Complaint:   Chief Complaint   Patient presents with    Follow-up    Headache       Subjective:   Petar Liu is a 64 y.o. man with cluster headaches who is following up.  He is doing well.  He has had only one cluster headache in the last year.  That was precipitated by bush hogging and dust exposure.  He uses Imitrex injections when he has a headache.  He has no required a steroid pack or Fioricet.  He has had no new health problems.        Objective:     Past Medical History:  Past Medical History:   Diagnosis Date    BPH (benign prostatic hyperplasia)     Carotid artery stenosis     Cluster headache, intractable     Hyperlipidemia     Hypertension     Kidney stone     Malignant neoplasm of left lung (HCC)     Malignant neoplasm of left lung (HCC)     Migraine headache     Other malaise and fatigue        Past Surgical History:   Procedure Laterality Date    BACK SURGERY      COLONOSCOPY  03/15/2013    Pamela: adenomatous and hyperplastic polyps (5 yr)    HERNIA REPAIR      LOBECTOMY Left     upper lobe    [place] COLONOSCOPY FLX DX W/COLLJ SPEC WHEN PFRMD N/A 2018    Dr Santiago-mirza, 5 yr recall       Recent Hospitalizations  None    Significant Injuries  None    Habits  Petar Liu reports that he quit smoking about 3 years ago. His smoking use included cigarettes. He started smoking about 38 years ago. He has a 35 pack-year smoking history. He has never used smokeless tobacco. He reports that he does not drink alcohol and does not use drugs.    Family History   Problem Relation Age of Onset    Cancer Mother         Lung CA    Cancer Father         Breast CA

## 2025-01-15 RX ORDER — ROSUVASTATIN CALCIUM 10 MG/1
10 TABLET, COATED ORAL DAILY
Qty: 90 TABLET | Refills: 3 | Status: SHIPPED | OUTPATIENT
Start: 2025-01-15

## 2025-01-29 ENCOUNTER — HOSPITAL ENCOUNTER (OUTPATIENT)
Dept: CT IMAGING | Facility: HOSPITAL | Age: 65
Discharge: HOME | End: 2025-01-29
Admitting: NURSE PRACTITIONER
Payer: COMMERCIAL

## 2025-01-29 ENCOUNTER — OFFICE VISIT (OUTPATIENT)
Dept: PULMONOLOGY | Facility: CLINIC | Age: 65
End: 2025-01-29
Payer: COMMERCIAL

## 2025-01-29 VITALS
OXYGEN SATURATION: 97 % | HEART RATE: 65 BPM | BODY MASS INDEX: 27.49 KG/M2 | WEIGHT: 207.4 LBS | DIASTOLIC BLOOD PRESSURE: 68 MMHG | HEIGHT: 73 IN | SYSTOLIC BLOOD PRESSURE: 110 MMHG

## 2025-01-29 DIAGNOSIS — J44.9 STAGE 1 MILD COPD BY GOLD CLASSIFICATION: Chronic | ICD-10-CM

## 2025-01-29 DIAGNOSIS — J43.9 BULLOUS EMPHYSEMA: Chronic | ICD-10-CM

## 2025-01-29 DIAGNOSIS — R91.8 MULTIPLE LUNG NODULES: Chronic | ICD-10-CM

## 2025-01-29 DIAGNOSIS — C34.92 ADENOCARCINOMA OF LEFT LUNG: Primary | ICD-10-CM

## 2025-01-29 DIAGNOSIS — Z87.891 PERSONAL HISTORY OF NICOTINE DEPENDENCE: ICD-10-CM

## 2025-01-29 PROCEDURE — 71250 CT THORAX DX C-: CPT

## 2025-01-29 NOTE — PROGRESS NOTES
" DIONY Fermin  [facility]   Pulmonary and Critical Care  [address]  [place] [address]  Phone: [phone]  Fax: [phone]           Chief Complaint  Multiple lung nodules    Subjective        Carlos Anaya presents to [facility] PULMONARY & CRITICAL CARE MEDICINE     History of Present Illness  Mr. Anaya is a pleasant 64 year old male patient of Dr. Jimbo Adam with known Adenocarcinoma of the left lung post left upper lobectomy by Dr. Morrow in Oct 2020. He was identified with a 7 mm spiculated nodule in the ABDOUL in Feb 2020. Former smoker (quit Oct 2020). Known emphysema/mild COPD.     He reports no systemic symptoms such as fevers, chills, night sweats, hemoptysis, hematochezia, unexplained weight loss, or hoarseness.  He also reports occasional choking episodes, approximately once every two weeks, typically triggered by fluid intake. These episodes are characterized by an initial non-productive cough, followed by a productive cough that clears the airway. He has not undergone a swallow study. Despite these symptoms, he perceives an improvement in his respiratory function, with quicker recovery from exertional dyspnea.          Objective   Vital Signs:   /68   Pulse 65   Ht 185.4 cm (73\")   Wt 94.1 kg (207 lb 6.4 oz)   SpO2 97% Comment: RA  BMI 27.36 kg/m²     Physical Exam  Vitals reviewed.   Constitutional:       Appearance: Normal appearance.   Cardiovascular:      Rate and Rhythm: Normal rate and regular rhythm.   Pulmonary:      Effort: Pulmonary effort is normal.      Breath sounds: Normal breath sounds.   Neurological:      General: No focal deficit present.      Mental Status: He is alert and oriented to person, place, and time.   Psychiatric:         Mood and Affect: Mood normal.         Behavior: Behavior normal.            Result Review :  The following data was reviewed by: IDONY Fermin on 01/29/2025:    Data reviewed : " Radiologic studies CT chest     My interpretation of imaging: Stable multiple lung nodules, heavy calcifications.  My interpretation of labs: None    PFT Values          1/22/2024    15:30   Pre Drug PFT Results   FVC 85   FEV1 81   FEF 25-75% 70   FEV1/FVC 72   Other Tests PFT Results   DLCO 73   D/VAsb 92     My interpretation of PFT: No new    Results for orders placed in visit on 01/22/24    Pulmonary Function Test    Narrative  Pulmonary Function Test    Performed by: Ankita Bowen, TULIO  Authorized by: Muna Washington APRN  Pre Drug % Predicted  FVC: 85%  FEV1: 81%  FEF 25-75%: 70%  FEV1/FVC: 72%  DLCO: 73%  D/VAsb: 92%    Interpretation  Spirometry  Spirometry shows mild restriction.  Review of FVL curve  Patient's effort is normal.  Diffusion Capacity  The patient's diffusion capacity is normal.  Diffusion capacity is normal when corrected for alveolar volume.  Overall comments: Compared to September 2021 FEV1/FVC ratio has improved from 68% predicted to 72% predicted.  This is moved him from a mild obstruction to a mild restriction.  FEV1 remains stable at 81% predicted and there was a drop in the FVC from 93% predicted to 85% predicted.  Diffusion capacity remains normal when corrected for alveolar volume.      Results for orders placed in visit on 09/02/21    Pulmonary Function Test    Narrative  Pulmonary Function Test  Performed by: Ankita Bowen, TULIO  Authorized by: Muna Washington APRN    Pre Drug % Predicted  FVC: 93%  FEV1: 80%  FEF 25-75%: 46%  FEV1/FVC: 68.56%  DLCO: 84%  D/VAsb: 101%    Interpretation  Spirometry  Spirometry shows mild obstruction.  Diffusion Capacity  The patient's diffusion capacity is normal.  Diffusion capacity is normal when corrected for alveolar volume.      Results for orders placed during the hospital encounter of 10/20/20    Full Pulmonary Function Test With Bronchodilator & ABG    Narrative  [facility] PULMONARY FUNCTION  TEST REPORT    Flow-volume curve:  Flow-volume curve is obstructive in configuration.    Spirometry:  FVC was 4.84 L or 92% of predicted with 0% change after bronchodilator challenge.  FEV1 was 3.37 L  or 84% of predicted with 6% change after bronchodilator challenge.  FVC:FEV1 ratio was 70  Spirometry values are showing obstructive pattern    Lung volumes:  TLC was 7.55 L or 100% of predicted  RV was 2.72 L or 115% of predicted    Lung volumes are within normal limit.    Diffusing capacity:  Corrected for velar volume was 79% of predicted    Diffusing capacity adjusted for single breath is moderately reduced.    Overall impression:    Above test results acceptable and reproducible by ATS criteria  Spirometry shows moderate obstruction  No bronchodilator response  Volumes are within normal limits without any hyperinflation or significant air trapping  Diffusion capacity is moderately decreased    There is no prior test results available for comparison.clinical correlation is indicated.    Gerhard Anderson MD,  [facility]  10/21/2020  11:39 CDT          Assessment and Plan   Diagnoses and all orders for this visit:    1. Adenocarcinoma of left lung (Primary)  -     Alpha - 1 - Antitrypsin Phenotype; Future    2. Stage 1 mild COPD by GOLD classification  -     Alpha - 1 - Antitrypsin Phenotype; Future    3. Bullous emphysema  -     Alpha - 1 - Antitrypsin Phenotype; Future    4. Multiple lung nodules  -     Alpha - 1 - Antitrypsin Phenotype; Future    5. Personal history of nicotine dependence  -     Alpha - 1 - Antitrypsin Phenotype; Future      Stable from a pulmonary standpoint.  Reviewed CT imaging with patient today.  Possible increased cavitary area of the right lower lobe 1.6 cm nodule.  Will await final pathology from the radiologist.  We also discussed that should his choking episodes worsen would recommend a swallow study.    Addendum: Radiology reports show no new  suspicious findings and stable multiple pulmonary nodules.  Patient is notified.    Carlos Anaya  reports that he quit smoking about 4 years ago. His smoking use included cigarettes. He started smoking about 46 years ago. He has a 41.8 pack-year smoking history. He has been exposed to tobacco smoke. He has never used smokeless tobacco.            Alpha 1: will discuss at follow up visit   LDCT: getting routine imaging for nodules/ known lung cancer   Smoking Cessation: Former, quit 2020   Vaccinations: Flu: Completed    Follow Up   No follow-ups on file.  Patient was given instructions and counseling regarding his condition or for health maintenance advice. Please see specific information pulled into the AVS if appropriate.     DIONY Fermin  1/29/2025  15:54 CST    Please note that portions of this note were completed with a voice recognition program.    Patient or patient representative verbalized consent for the use of Ambient Listening during the visit with  DIONY Fermin for chart documentation. 1/30/2025  15:26 CST

## 2025-01-30 ENCOUNTER — TELEPHONE (OUTPATIENT)
Dept: PULMONOLOGY | Facility: CLINIC | Age: 65
End: 2025-01-30
Payer: COMMERCIAL

## 2025-01-30 NOTE — TELEPHONE ENCOUNTER
----- Message from Muna Washington sent at 1/30/2025  3:27 PM CST -----  Discussed results of the pulmonary findings with patient.  Failed to mention to patient that it looks like he has a nonobstructing right upper pole kidney stone.  Notify patient and notify PCP please  
Relayed message to patient about the kidney stone. Faxed to Swathi VORA's office at [phone].  
stable

## 2025-02-04 ENCOUNTER — OFFICE VISIT (OUTPATIENT)
Dept: PRIMARY CARE CLINIC | Age: 65
End: 2025-02-04

## 2025-02-04 VITALS
DIASTOLIC BLOOD PRESSURE: 78 MMHG | OXYGEN SATURATION: 99 % | HEART RATE: 72 BPM | TEMPERATURE: 98.3 F | BODY MASS INDEX: 27.75 KG/M2 | HEIGHT: 73 IN | WEIGHT: 209.4 LBS | RESPIRATION RATE: 16 BRPM | SYSTOLIC BLOOD PRESSURE: 110 MMHG

## 2025-02-04 DIAGNOSIS — Z13.220 ENCOUNTER FOR SCREENING FOR LIPID DISORDER: ICD-10-CM

## 2025-02-04 DIAGNOSIS — G44.021 INTRACTABLE CHRONIC CLUSTER HEADACHE: ICD-10-CM

## 2025-02-04 DIAGNOSIS — R91.8 ABNORMAL CT LUNG SCREENING: ICD-10-CM

## 2025-02-04 DIAGNOSIS — Z12.5 SCREENING PSA (PROSTATE SPECIFIC ANTIGEN): ICD-10-CM

## 2025-02-04 DIAGNOSIS — Z13.1 SCREENING FOR DIABETES MELLITUS: ICD-10-CM

## 2025-02-04 DIAGNOSIS — E78.2 MIXED HYPERLIPIDEMIA: ICD-10-CM

## 2025-02-04 DIAGNOSIS — Z00.00 WELL ADULT EXAM: ICD-10-CM

## 2025-02-04 DIAGNOSIS — I65.23 BILATERAL CAROTID ARTERY STENOSIS: Primary | ICD-10-CM

## 2025-02-04 LAB
ALBUMIN SERPL-MCNC: 4.2 G/DL (ref 3.5–5.2)
ALP SERPL-CCNC: 125 U/L (ref 40–129)
ALT SERPL-CCNC: 13 U/L (ref 5–41)
ANION GAP SERPL CALCULATED.3IONS-SCNC: 15 MMOL/L (ref 8–16)
AST SERPL-CCNC: 15 U/L (ref 5–40)
BASOPHILS # BLD: 0.1 K/UL (ref 0–0.2)
BASOPHILS NFR BLD: 0.7 % (ref 0–1)
BILIRUB SERPL-MCNC: 0.4 MG/DL (ref 0.2–1.2)
BUN SERPL-MCNC: 9 MG/DL (ref 8–23)
CALCIUM SERPL-MCNC: 9.3 MG/DL (ref 8.8–10.2)
CHLORIDE SERPL-SCNC: 103 MMOL/L (ref 98–107)
CHOLEST SERPL-MCNC: 149 MG/DL (ref 0–199)
CO2 SERPL-SCNC: 23 MMOL/L (ref 22–29)
CREAT SERPL-MCNC: 1 MG/DL (ref 0.7–1.2)
EOSINOPHIL # BLD: 0.3 K/UL (ref 0–0.6)
EOSINOPHIL NFR BLD: 2.9 % (ref 0–5)
ERYTHROCYTE [DISTWIDTH] IN BLOOD BY AUTOMATED COUNT: 13.2 % (ref 11.5–14.5)
GLUCOSE SERPL-MCNC: 77 MG/DL (ref 70–99)
HCT VFR BLD AUTO: 45.5 % (ref 42–52)
HDLC SERPL-MCNC: 29 MG/DL (ref 40–60)
HGB BLD-MCNC: 15.1 G/DL (ref 14–18)
IMM GRANULOCYTES # BLD: 0 K/UL
LDLC SERPL CALC-MCNC: 88 MG/DL
LYMPHOCYTES # BLD: 2.9 K/UL (ref 1.1–4.5)
LYMPHOCYTES NFR BLD: 30.5 % (ref 20–40)
MCH RBC QN AUTO: 31.9 PG (ref 27–31)
MCHC RBC AUTO-ENTMCNC: 33.2 G/DL (ref 33–37)
MCV RBC AUTO: 96 FL (ref 80–94)
MONOCYTES # BLD: 0.7 K/UL (ref 0–0.9)
MONOCYTES NFR BLD: 7.2 % (ref 0–10)
NEUTROPHILS # BLD: 5.5 K/UL (ref 1.5–7.5)
NEUTS SEG NFR BLD: 58.5 % (ref 50–65)
PLATELET # BLD AUTO: 266 K/UL (ref 130–400)
PMV BLD AUTO: 9.5 FL (ref 9.4–12.4)
POTASSIUM SERPL-SCNC: 4.3 MMOL/L (ref 3.5–5.1)
PROT SERPL-MCNC: 7.4 G/DL (ref 6.4–8.3)
PSA SERPL-MCNC: 0.76 NG/ML (ref 0–4)
RBC # BLD AUTO: 4.74 M/UL (ref 4.7–6.1)
SODIUM SERPL-SCNC: 141 MMOL/L (ref 136–145)
TRIGL SERPL-MCNC: 158 MG/DL (ref 0–149)
WBC # BLD AUTO: 9.4 K/UL (ref 4.8–10.8)

## 2025-02-04 RX ORDER — VERAPAMIL HYDROCHLORIDE 240 MG/1
TABLET, FILM COATED, EXTENDED RELEASE ORAL
Qty: 180 TABLET | Refills: 2 | Status: SHIPPED | OUTPATIENT
Start: 2025-02-04

## 2025-02-04 RX ORDER — OMEPRAZOLE 40 MG/1
40 CAPSULE, DELAYED RELEASE ORAL
Qty: 90 CAPSULE | Refills: 1 | Status: SHIPPED | OUTPATIENT
Start: 2025-02-04

## 2025-02-04 RX ORDER — SILDENAFIL 50 MG/1
50 TABLET, FILM COATED ORAL DAILY PRN
Qty: 12 TABLET | Refills: 3 | Status: SHIPPED | OUTPATIENT
Start: 2025-02-04

## 2025-02-04 SDOH — ECONOMIC STABILITY: FOOD INSECURITY: WITHIN THE PAST 12 MONTHS, YOU WORRIED THAT YOUR FOOD WOULD RUN OUT BEFORE YOU GOT MONEY TO BUY MORE.: NEVER TRUE

## 2025-02-04 SDOH — ECONOMIC STABILITY: FOOD INSECURITY: WITHIN THE PAST 12 MONTHS, THE FOOD YOU BOUGHT JUST DIDN'T LAST AND YOU DIDN'T HAVE MONEY TO GET MORE.: NEVER TRUE

## 2025-02-04 ASSESSMENT — PATIENT HEALTH QUESTIONNAIRE - PHQ9
SUM OF ALL RESPONSES TO PHQ QUESTIONS 1-9: 0
1. LITTLE INTEREST OR PLEASURE IN DOING THINGS: NOT AT ALL
SUM OF ALL RESPONSES TO PHQ QUESTIONS 1-9: 0

## 2025-02-04 ASSESSMENT — ENCOUNTER SYMPTOMS
GASTROINTESTINAL NEGATIVE: 1
ALLERGIC/IMMUNOLOGIC NEGATIVE: 1
RESPIRATORY NEGATIVE: 1
EYES NEGATIVE: 1

## 2025-02-04 NOTE — PROGRESS NOTES
Screen  Collected: 9/25/2023  9:10 AM (Final result)              POCT Rapid Drug Screen  Collected: 3/18/2021  1:35 PM (Final result)                  Medication Contract and Consent for Opioid Use Documents Filed       Patient Documents       Type of Document Status Date Received Received By Description    Medication Contract Signed 6/29/2017  2:33 PM ANJANA MARTINEZ M     Medication Contract Received 10/27/2020 10:38 AM YRIS BENJAMIN CONTRACT     Medication Contract Received 9/25/2023  3:50 PM BREANA GUNTER 9-25-23                     Patient given educational materials -see patient instructions.  Discussed use, benefit, and side effects of prescribed medications.  All patient questions answered.  Pt voiced understanding. Reviewed health maintenance.  Instructed to continue currentmedications, diet and exercise.  Patient agreed with treatment plan. Follow up as directed.   MEDICATIONS:  Orders Placed This Encounter   Medications    omeprazole (PRILOSEC) 40 MG delayed release capsule     Sig: Take 1 capsule by mouth every morning (before breakfast)     Dispense:  90 capsule     Refill:  1    verapamil (CALAN SR) 240 MG extended release tablet     Sig: TAKE 1 TABLET BY MOUTH TWICE A DAY     Dispense:  180 tablet     Refill:  2    sildenafil (VIAGRA) 50 MG tablet     Sig: Take 1 tablet by mouth daily as needed for Erectile Dysfunction     Dispense:  12 tablet     Refill:  3         ORDERS:  Orders Placed This Encounter   Procedures    CBC with Auto Differential    Lipid Panel    Comprehensive Metabolic Panel    PSA Screening    External Referral To Cardiology     The 10-year ASCVD risk score (Raul ALEMAN, et al., 2019) is: 9.5%    Values used to calculate the score:      Age: 64 years      Sex: Male      Is Non- : No      Diabetic: No      Tobacco smoker: No      Systolic Blood Pressure: 110 mmHg      Is BP treated: No      HDL Cholesterol: 29 mg/dL      Total Cholesterol: 149

## 2025-02-04 NOTE — PATIENT INSTRUCTIONS
Continue your cholesterol medicine and your blood pressure medicine  Refer to cardiology for workup for the plaques seen on your lung CT  Continue with pulmonology for your lungs  You may schedule a colonoscopy at any time for screening we can discuss a Cologuard later if you do not have time  Continue with Dr. Sylvester for your headaches

## 2025-02-10 ENCOUNTER — TELEPHONE (OUTPATIENT)
Dept: PULMONOLOGY | Facility: CLINIC | Age: 65
End: 2025-02-10
Payer: COMMERCIAL

## 2025-02-10 DIAGNOSIS — J43.9 BULLOUS EMPHYSEMA: Chronic | ICD-10-CM

## 2025-02-10 DIAGNOSIS — R91.8 MULTIPLE LUNG NODULES: Chronic | ICD-10-CM

## 2025-02-10 DIAGNOSIS — Z87.891 PERSONAL HISTORY OF NICOTINE DEPENDENCE: ICD-10-CM

## 2025-02-10 DIAGNOSIS — C34.92 ADENOCARCINOMA OF LEFT LUNG: ICD-10-CM

## 2025-02-10 DIAGNOSIS — J44.9 STAGE 1 MILD COPD BY GOLD CLASSIFICATION: Chronic | ICD-10-CM

## 2025-02-10 NOTE — TELEPHONE ENCOUNTER
----- Message from Muna Washington sent at 2/10/2025  7:48 AM CST -----  Please let patient know that I had Dr. Adam review his imaging as well and he agrees to just continue to follow.  Keep his 6-month follow-up as scheduled with CT scan  ----- Message -----  From: Jimbo Adam MD  Sent: 2/7/2025   4:07 PM CST  To: DIONY Fermin    Just continue to follow up ct on him

## 2025-07-15 ENCOUNTER — OFFICE VISIT (OUTPATIENT)
Dept: OTOLARYNGOLOGY | Facility: CLINIC | Age: 65
End: 2025-07-15
Payer: COMMERCIAL

## 2025-07-15 ENCOUNTER — PATIENT ROUNDING (BHMG ONLY) (OUTPATIENT)
Dept: OTOLARYNGOLOGY | Facility: CLINIC | Age: 65
End: 2025-07-15
Payer: COMMERCIAL

## 2025-07-15 VITALS
SYSTOLIC BLOOD PRESSURE: 115 MMHG | DIASTOLIC BLOOD PRESSURE: 76 MMHG | HEIGHT: 73 IN | BODY MASS INDEX: 28.49 KG/M2 | WEIGHT: 215 LBS

## 2025-07-15 DIAGNOSIS — C34.92 ADENOCARCINOMA OF LEFT LUNG: ICD-10-CM

## 2025-07-15 DIAGNOSIS — R59.1 LYMPHADENOPATHY: Primary | ICD-10-CM

## 2025-07-15 NOTE — PATIENT INSTRUCTIONS
LYMPH NODE BIOPSY: The risks, benefits, and alternatives of the procedure including but not limited to pain, numbness, nerve injury, scarring, bleeding, infection, persistent symptoms, and risks of the anesthesia were discussed full with the patient and questions were answered. No guarantees were made or implied.

## 2025-07-15 NOTE — PROGRESS NOTES
July 15, 2025    Hello, may I speak with Carlos Anaya?    My name is YUE      I am  with [facility] EAR NOSE & THROAT  [address], [address]  [place] [address] [phone].    Before we get started may I verify your date of birth? 1960    I am calling to officially welcome you to our practice and ask about your recent visit. Is this a good time to talk? yes    Tell me about your visit with us. What things went well?  EVERYTHING WENT WELL       We're always looking for ways to make our patients' experiences even better. Do you have recommendations on ways we may improve?  no    Overall were you satisfied with your first visit to our practice? yes       I appreciate you taking the time to speak with me today. Is there anything else I can do for you? no      Thank you, and have a great day.

## 2025-07-15 NOTE — PROGRESS NOTES
YOB: 1960  Location: [facility]  Location Address: [address] [place] [address], [address], [address] [place] [address]  Location Phone: [phone]    Chief Complaint   Patient presents with    Swollen Glands       History of Present Illness  Carlos Anaya is a 64 y.o. male.  Carlos Anaya is here for follow up of ENT complaints. The patient has had problems with lymphadenopathy.  Patient states lymph node has been present for the past 3 to 4 weeks.  He was given antibiotics he feels his lymph nodes did decrease somewhat in size does remain present he denies tenderness to the area   Patient has a history of adenocarcinoma of the left upper lung with lobectomy patient has serial chest CT       Past Medical History:   Diagnosis Date    High cholesterol     Lung nodule     Migraine     Migraines        Past Surgical History:   Procedure Laterality Date    BACK SURGERY      BRONCHOSCOPY N/A 10/27/2020    Procedure: BRONCHOSCOPY;  Surgeon: Torrey Morrow MD;  Location: [facility];  Service: Cardiothoracic;  Laterality: N/A;    COLONOSCOPY      HERNIA REPAIR      LARYNGOSCOPY N/A 10/27/2020    Procedure: Direct laryngoscopy, rigid nasal endoscopy with biopsy;  Surgeon: Rick Brown MD;  Location: [facility] OR;  Service: ENT;  Laterality: N/A;    LUNG SURGERY      NASAL ENDOSCOPY N/A 10/27/2020    Procedure: rigid nasal endoscopy with biopsy;  Surgeon: Rick Brown MD;  Location: [facility] OR;  Service: ENT;  Laterality: N/A;    THORACOSCOPY Left 10/27/2020    Procedure: LEFT THORACOSCOPY WITH DAVINCI ROBOT WITH WEDGE RESECTION, LOBECTOMY, MEDIASTINAL LYMPH NODE DISSECTION;  Surgeon: Torrey Morrow MD;  Location: [facility] OR;  Service: DaVinci;  Laterality: Left;       Outpatient Medications Marked as Taking for the 7/15/25 encounter (Office Visit) with Rick Brown MD   Medication Sig Dispense Refill    aspirin 81 MG tablet Take 1 tablet by mouth Daily.      butalbital-acetaminophen-caffeine (FIORICET, ESGIC)  -40 MG per tablet TAKE 1 TABLET BY MOUTH TWICE DAILY AS NEEDED FOR HEADACHE      eletriptan (RELPAX) 40 MG tablet TK ONE T PO AS NEEDED FOR MIGRAINE. CAN REPEAT IN TWO HOURS. MAX IS 2 IN 24 HOURS AND 4 IN A WEEK      fluticasone (FLONASE) 50 MCG/ACT nasal spray 2 sprays into the nostril(s) as directed by provider Daily. 16 g 11    melatonin 3 MG tablet Take 1 tablet by mouth Every Night.      methocarbamol (ROBAXIN) 500 MG tablet Take 1 tablet by mouth Every 8 (Eight) Hours As Needed for Muscle Spasms. 30 tablet 0    omeprazole (priLOSEC) 40 MG capsule Take 1 capsule by mouth Every Morning.      predniSONE (DELTASONE) 20 MG tablet Take 1 tablet by mouth As Needed. Takes for cluster headaches      rosuvastatin (CRESTOR) 10 MG tablet Take 1 tablet by mouth Every Night.      sildenafil (VIAGRA) 50 MG tablet Take 1 tablet by mouth.      SUMAtriptan Succinate (IMITREX) 6 MG/0.5ML injection INJECT 1 SYRINGE AT ONSET OF A HEADACHE, MAY REPEAT IN 2 HOURS IF HEADACHE PERSISTS. MAX OF 2 IN 24 HOURS OR 4 WEEKLY      tamsulosin (FLOMAX) 0.4 MG capsule 24 hr capsule Take 1 capsule by mouth Daily.      traMADol (ULTRAM) 50 MG tablet Take 1 tablet by mouth At Night As Needed for Moderate Pain . 10 tablet 0    verapamil SR (CALAN-SR) 240 MG CR tablet Takes for headache         Patient has no known allergies.    Family History   Problem Relation Age of Onset    Lung cancer Mother     Breast cancer Father        Social History     Socioeconomic History    Marital status:    Tobacco Use    Smoking status: Former     Current packs/day: 0.00     Average packs/day: 1 pack/day for 41.8 years (41.8 ttl pk-yrs)     Types: Cigarettes     Start date:      Quit date: 10/27/2020     Years since quittin.7     Passive exposure: Past    Smokeless tobacco: Never   Vaping Use    Vaping status: Never Used   Substance and Sexual Activity    Alcohol use: Not Currently    Drug use: Never    Sexual activity: Defer       Review of  Systems   Constitutional:  Positive for fatigue.   HENT:          Admits lymphadenopathy       Vitals:    07/15/25 1047   BP: 115/76       Body mass index is 28.37 kg/m².    Objective     Physical Exam  Vitals reviewed.   Constitutional:       Appearance: Normal appearance.   HENT:      Head: Normocephalic.      Right Ear: External ear normal.      Left Ear: External ear normal.      Nose: Nose normal.      Mouth/Throat:      Lips: Pink.   Neck:     Neurological:      Mental Status: He is alert.         Assessment & Plan   Diagnoses and all orders for this visit:    1. Lymphadenopathy (Primary)  -     Cancel: CT Soft Tissue Neck With Contrast; Future  -     CT Soft Tissue Neck With Contrast; Future  -     Case Request; Standing  -     Follow Anesthesia Guidelines / Protocol; Future  -     Follow Anesthesia Guidelines / Protocol; Standing  -     Verify NPO Status; Standing  -     SCD (Sequential Compression Device) - To Be Placed on Patient in Pre-Op; Standing  -     Patient to Void Prior to Transfer to OR; Standing  -     Instructions for Nursing; Standing  -     Case Request    2. Adenocarcinoma of left lung  -     Cancel: CT Soft Tissue Neck With Contrast; Future  -     CT Soft Tissue Neck With Contrast; Future  -     Case Request; Standing  -     Follow Anesthesia Guidelines / Protocol; Future  -     Follow Anesthesia Guidelines / Protocol; Standing  -     Verify NPO Status; Standing  -     SCD (Sequential Compression Device) - To Be Placed on Patient in Pre-Op; Standing  -     Patient to Void Prior to Transfer to OR; Standing  -     Instructions for Nursing; Standing  -     Case Request      LEFT CERVICAL LYMPH NODE BIOPSY/EXCISION (Left)  Orders Placed This Encounter   Procedures    CT Soft Tissue Neck With Contrast     Standing Status:   Future     Expected Date:   7/16/2025     Expiration Date:   10/15/2026     Reason for Exam::   neck mass     Release to patient:   Routine Release [[id number]]     No  follow-ups on file.     CT soft tissue neck  Risk versus benefits of surgical excision cervical lymph node discussed patient wished to proceed  Patient Instructions   LYMPH NODE BIOPSY: The risks, benefits, and alternatives of the procedure including but not limited to pain, numbness, nerve injury, scarring, bleeding, infection, persistent symptoms, and risks of the anesthesia were discussed full with the patient and questions were answered. No guarantees were made or implied.

## 2025-07-18 ENCOUNTER — TELEPHONE (OUTPATIENT)
Dept: OTOLARYNGOLOGY | Facility: CLINIC | Age: 65
End: 2025-07-18
Payer: COMMERCIAL

## 2025-07-18 NOTE — TELEPHONE ENCOUNTER
Caller: Carlos Anaya     Relationship: SELF    Best call back number: [phone]     What orders are you requesting (i.e. lab or imaging): IMAGING    In what timeframe would the patient need to come in: ASAP    Where will you receive your lab/imaging services: [email or web address]    Additional notes: [facility] -[phone]

## 2025-07-18 NOTE — TELEPHONE ENCOUNTER
Returned phone call and left message for patient and advised that [facility] can do the imagining however they do not do a full exam and give as many images as [facility].  The surgeon prefers to have the imaging done here.  If done at a different facility it may not be all we need and if it needs to be repeated insurance may not cover a second testing.  Advised to keep appt and imagining here at [facility] and to call with any questions.

## 2025-07-21 ENCOUNTER — HOSPITAL ENCOUNTER (OUTPATIENT)
Dept: CT IMAGING | Facility: HOSPITAL | Age: 65
Discharge: HOME | End: 2025-07-21
Admitting: NURSE PRACTITIONER
Payer: COMMERCIAL

## 2025-07-21 DIAGNOSIS — C34.92 ADENOCARCINOMA OF LEFT LUNG: ICD-10-CM

## 2025-07-21 DIAGNOSIS — R59.1 LYMPHADENOPATHY: ICD-10-CM

## 2025-07-21 PROCEDURE — 70491 CT SOFT TISSUE NECK W/DYE: CPT

## 2025-07-21 PROCEDURE — 25510000001 IOPAMIDOL 61 % SOLUTION: Performed by: NURSE PRACTITIONER

## 2025-07-21 PROCEDURE — 82565 ASSAY OF CREATININE: CPT

## 2025-07-21 RX ORDER — IOPAMIDOL 612 MG/ML
100 INJECTION, SOLUTION INTRAVASCULAR
Status: COMPLETED | OUTPATIENT
Start: 2025-07-21 | End: 2025-07-21

## 2025-07-21 RX ADMIN — IOPAMIDOL 100 ML: 612 INJECTION, SOLUTION INTRAVENOUS at 13:04

## 2025-07-22 LAB — CREAT BLDA-MCNC: 1.3 MG/DL (ref 0.6–1.3)

## 2025-07-25 DIAGNOSIS — R91.1 RIGHT UPPER LOBE PULMONARY NODULE: Primary | ICD-10-CM

## 2025-07-29 DIAGNOSIS — C34.92 ADENOCARCINOMA OF LEFT LUNG: ICD-10-CM

## 2025-07-29 DIAGNOSIS — R59.1 LYMPHADENOPATHY: Primary | ICD-10-CM

## 2025-07-29 DIAGNOSIS — K11.8 PAROTID MASS: ICD-10-CM

## 2025-07-30 DIAGNOSIS — R91.1 RIGHT UPPER LOBE PULMONARY NODULE: ICD-10-CM

## 2025-07-31 ENCOUNTER — TELEPHONE (OUTPATIENT)
Dept: PULMONOLOGY | Facility: CLINIC | Age: 65
End: 2025-07-31

## 2025-07-31 ENCOUNTER — OFFICE VISIT (OUTPATIENT)
Dept: PULMONOLOGY | Facility: CLINIC | Age: 65
End: 2025-07-31
Payer: COMMERCIAL

## 2025-07-31 VITALS
SYSTOLIC BLOOD PRESSURE: 135 MMHG | BODY MASS INDEX: 28.63 KG/M2 | DIASTOLIC BLOOD PRESSURE: 68 MMHG | WEIGHT: 216 LBS | OXYGEN SATURATION: 97 % | HEART RATE: 60 BPM | HEIGHT: 73 IN

## 2025-07-31 DIAGNOSIS — J44.9 STAGE 1 MILD COPD BY GOLD CLASSIFICATION: Primary | Chronic | ICD-10-CM

## 2025-07-31 DIAGNOSIS — R91.8 MULTIPLE LUNG NODULES: Chronic | ICD-10-CM

## 2025-07-31 DIAGNOSIS — J43.9 BULLOUS EMPHYSEMA: Chronic | ICD-10-CM

## 2025-07-31 DIAGNOSIS — Z87.891 PERSONAL HISTORY OF NICOTINE DEPENDENCE: ICD-10-CM

## 2025-07-31 DIAGNOSIS — C34.92 ADENOCARCINOMA OF LEFT LUNG: ICD-10-CM

## 2025-07-31 NOTE — PROGRESS NOTES
DIONY Fermin  [facility]   Pulmonary and Critical Care  [address] [place] [address]  Phone: [phone]  Fax: [phone]           Chief Complaint  Adenocarcinoma of left lung    Subjective        Carlos Anaya presents to [facility] PULMONARY & CRITICAL CARE MEDICINE     History of Present Illness  Mr. Anaya is a pleasant 64 year old male patient of Dr. Jimbo Adam with known Adenocarcinoma of the left lung post left upper lobectomy by Dr. Morrow in Oct 2020. He was identified with a 7 mm spiculated nodule in the ABDOUL in Feb 2020. Former smoker (quit Oct 2020). Known emphysema/mild COPD.     He reports feeling well overall with no significant respiratory issues.  He noted his oxygen level was noted to be a little low during a recent visit to [facility] but attributed that to shallow breathing and relaxation.  He does not recall the exact oxygen level but notes that it improved after taking a few deep breaths.  He has not experienced any episodes of lightheadedness.  He owns a pulse oximeter, which he uses to monitor his oxygen levels.  He is not currently on any steroid medication.  He reports no fevers, chills, night sweats, unexplained weight loss, hemoptysis, hematochezia, or wheezing.  He has noticed some weight gain and feels slightly fatigued, which he attributes to his current sedentary job.  He maintains an active lifestyle, including walking his dog regularly.    Supplemental Information  He had a salivary gland surgery scheduled for tomorrow, but it was canceled. They are going to do a biopsy instead.  He was told there is a 95% chance it is nothing. When they did the ultrasound, they said it was a lymph node.  He is hoping a biopsy is all they are going to do. They are going to do a needle biopsy.  He has not been given a date yet for the biopsy.  He was told they would contact him whenever they had something scheduled.       Objective   Vital Signs:  "  /68   Pulse 60   Ht 185.5 cm (73.03\")   Wt 98 kg (216 lb)   SpO2 97% Comment: RA  BMI 28.47 kg/m²     Physical Exam  Vitals reviewed.   Constitutional:       Appearance: Normal appearance.   Cardiovascular:      Rate and Rhythm: Normal rate and regular rhythm.   Pulmonary:      Effort: Pulmonary effort is normal.      Breath sounds: Normal breath sounds.   Neurological:      General: No focal deficit present.      Mental Status: He is alert and oriented to person, place, and time.   Psychiatric:         Mood and Affect: Mood normal.         Behavior: Behavior normal.          Result Review :  The following data was reviewed by: DINOY Fermin on 07/31/2025:    Data reviewed : Radiologic studies CT of the neck and chest   My interpretation of imaging: Right upper lobe stable cluster of cystic spaces with peripheral wall thickening/groundglass attenuation.  My interpretation of labs: None  CT Soft Tissue Neck With Contrast (07/21/2025 13:03)   CT Neck 7/21/2025   IMPRESSION:  Impression:     1. 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  2. Enlarging right upper lobe atypical pulmonary cyst measuring up to  2.9 cm with what appears to be increasing solid component over time,  suspected primary lung neoplasm. This is superimposed on a lung  emphysema.  3. Cervical spine osteoarthritis change.     This report was signed and finalized on 7/21/2025 2:59 PM by Dr Frederic Louie.  PFT Values          1/22/2024    15:30   Pre Drug PFT Results   FVC 85   FEV1 81   FEF 25-75% 70   FEV1/FVC 72   Other Tests PFT Results   DLCO 73   D/VAsb 92     My interpretation of the PFT : no new     Results for orders placed in visit on 01/22/24    Pulmonary Function Test    Narrative  Pulmonary Function Test    Performed by: Ankita Bowen, RRT  Authorized by: Muna Washington APRN  Pre " Drug % Predicted  FVC: 85%  FEV1: 81%  FEF 25-75%: 70%  FEV1/FVC: 72%  DLCO: 73%  D/VAsb: 92%    Interpretation  Spirometry  Spirometry shows mild restriction.  Review of FVL curve  Patient's effort is normal.  Diffusion Capacity  The patient's diffusion capacity is normal.  Diffusion capacity is normal when corrected for alveolar volume.  Overall comments: Compared to September 2021 FEV1/FVC ratio has improved from 68% predicted to 72% predicted.  This is moved him from a mild obstruction to a mild restriction.  FEV1 remains stable at 81% predicted and there was a drop in the FVC from 93% predicted to 85% predicted.  Diffusion capacity remains normal when corrected for alveolar volume.      Results for orders placed in visit on 09/02/21    Pulmonary Function Test    Narrative  Pulmonary Function Test  Performed by: Ankita Bowen, RRT  Authorized by: Muna Washington APRN    Pre Drug % Predicted  FVC: 93%  FEV1: 80%  FEF 25-75%: 46%  FEV1/FVC: 68.56%  DLCO: 84%  D/VAsb: 101%    Interpretation  Spirometry  Spirometry shows mild obstruction.  Diffusion Capacity  The patient's diffusion capacity is normal.  Diffusion capacity is normal when corrected for alveolar volume.      Results for orders placed during the hospital encounter of 10/20/20    Full Pulmonary Function Test With Bronchodilator & ABG    [facility]  [facility] PULMONARY FUNCTION TEST REPORT    Flow-volume curve:  Flow-volume curve is obstructive in configuration.    Spirometry:  FVC was 4.84 L or 92% of predicted with 0% change after bronchodilator challenge.  FEV1 was 3.37 L  or 84% of predicted with 6% change after bronchodilator challenge.  FVC:FEV1 ratio was 70  Spirometry values are showing obstructive pattern    Lung volumes:  TLC was 7.55 L or 100% of predicted  RV was 2.72 L or 115% of predicted    Lung volumes are within normal limit.    Diffusing capacity:  Corrected for velar volume was 79% of  predicted    Diffusing capacity adjusted for single breath is moderately reduced.    Overall impression:    Above test results acceptable and reproducible by ATS criteria  Spirometry shows moderate obstruction  No bronchodilator response  Volumes are within normal limits without any hyperinflation or significant air trapping  Diffusion capacity is moderately decreased    There is no prior test results available for comparison.clinical correlation is indicated.    Gerhard Anderson MD,  [facility]  10/21/2020  11:39 CDT          Assessment and Plan   Diagnoses and all orders for this visit:    1. Stage 1 mild COPD by GOLD classification (Primary)    2. Multiple lung nodules    3. Bullous emphysema    4. Adenocarcinoma of left lung    5. Personal history of nicotine dependence      Reviewed and discussed patient's CT from January 2029 of the chest, CT from July 21st of the neck and July 25 of the chest done at [facility].  I do not have the images pushed over from [facility] as of yet so I was unable to do a [id number].  However I did log into [facility] and showed the imaging to the patient and then logged into [facility] and showed him his January 29 imaging.  Radiologist compared the 2 as well and I concur with the radiologist that the cystic cluster of the right upper lobe appears stable.  At this time I have asked the patient to follow-up again in 6 months with a CT.      Carlos Anaya  reports that he quit smoking about 4 years ago. His smoking use included cigarettes. He started smoking about 46 years ago. He has a 41.8 pack-year smoking history. He has been exposed to tobacco smoke. He has never used smokeless tobacco.          Alpha 1: will discuss at follow up visit   LDCT: getting routine imaging for nodules/ known lung cancer   Smoking Cessation: Former, quit 2020   Vaccinations: Flu: Completed       Follow Up   Return in about 6 months (around 1/31/2026) for  CT.  Patient was given instructions and counseling regarding his condition or for health maintenance advice. Please see specific information pulled into the AVS if appropriate.     DIONY Fermin  8/1/2025  17:09 CDT    Please note that portions of this note were completed with a voice recognition program.    Patient or patient representative verbalized consent for the use of Ambient Listening during the visit with  DIONY Fermin for chart documentation. 8/1/2025  17:09 CDT    The 21st Century Cures Act makes medical notes like this available to patients in the interest of transparency; however, please be advised this is a medical document.  It is intended as [id number] communication.  It is written in medical language and may contain abbreviations or verbiage that are unfamiliar.  It may appear blunt or direct.  Medical documents are intended to carry relevant information, facts as evident, and the clinical opinion of the practitioner.  This note may have been transcribed using a voice dictation system.  Voice-recognition errors may occur.  This should not be taken to alter the content or meaning of this note.

## 2025-08-07 ENCOUNTER — TELEPHONE (OUTPATIENT)
Dept: PULMONOLOGY | Facility: CLINIC | Age: 65
End: 2025-08-07
Payer: COMMERCIAL

## 2025-08-07 DIAGNOSIS — R91.1 RIGHT UPPER LOBE PULMONARY NODULE: ICD-10-CM

## 2025-08-07 DIAGNOSIS — R91.8 MULTIPLE LUNG NODULES: Primary | ICD-10-CM

## 2025-08-12 ENCOUNTER — TELEPHONE (OUTPATIENT)
Dept: INTERVENTIONAL RADIOLOGY/VASCULAR | Facility: HOSPITAL | Age: 65
End: 2025-08-12
Payer: COMMERCIAL

## 2025-08-13 ENCOUNTER — HOSPITAL ENCOUNTER (OUTPATIENT)
Dept: ULTRASOUND IMAGING | Facility: HOSPITAL | Age: 65
Discharge: HOME | End: 2025-08-13
Payer: COMMERCIAL

## 2025-08-13 DIAGNOSIS — K11.8 PAROTID MASS: ICD-10-CM

## 2025-08-13 DIAGNOSIS — C34.92 ADENOCARCINOMA OF LEFT LUNG: ICD-10-CM

## 2025-08-13 DIAGNOSIS — R59.1 LYMPHADENOPATHY: ICD-10-CM

## 2025-08-13 PROCEDURE — 76536 US EXAM OF HEAD AND NECK: CPT

## 2025-08-13 PROCEDURE — 76942 ECHO GUIDE FOR BIOPSY: CPT

## 2025-08-13 PROCEDURE — 88112 CYTOPATH CELL ENHANCE TECH: CPT | Performed by: NURSE PRACTITIONER

## 2025-08-13 PROCEDURE — 88172 CYTP DX EVAL FNA 1ST EA SITE: CPT | Performed by: NURSE PRACTITIONER

## 2025-08-13 PROCEDURE — 88177 CYTP FNA EVAL EA ADDL: CPT | Performed by: NURSE PRACTITIONER

## 2025-08-13 RX ORDER — LIDOCAINE HYDROCHLORIDE 10 MG/ML
5 INJECTION, SOLUTION INFILTRATION; PERINEURAL ONCE
Status: DISPENSED | OUTPATIENT
Start: 2025-08-13

## 2025-08-16 LAB
BEAKER LAB AP INTRAOPERATIVE CONSULTATION: NORMAL
CYTO UR: NORMAL
LAB AP CASE REPORT: NORMAL
LAB AP CLINICAL INFORMATION: NORMAL
Lab: NORMAL
PATH REPORT.FINAL DX SPEC: NORMAL
PATH REPORT.GROSS SPEC: NORMAL

## 2025-08-18 ENCOUNTER — RESULTS FOLLOW-UP (OUTPATIENT)
Dept: ULTRASOUND IMAGING | Facility: HOSPITAL | Age: 65
End: 2025-08-18
Payer: COMMERCIAL

## 2025-08-25 ENCOUNTER — TELEPHONE (OUTPATIENT)
Dept: INTERVENTIONAL RADIOLOGY/VASCULAR | Facility: HOSPITAL | Age: 65
End: 2025-08-25
Payer: COMMERCIAL

## (undated) DEVICE — PACK,SET UP,NO DRAPES: Brand: MEDLINE

## (undated) DEVICE — GLV SURG DERMASSURE GRN LF PF 8.0

## (undated) DEVICE — 24 FR STRAIGHT – SOFT PVC CATHETER: Brand: PVC THORACIC CATHETERS

## (undated) DEVICE — SPNG LAP CIGARETTE KITTNER 5MM STRL PK/5

## (undated) DEVICE — NEEDLE, QUINCKE 22GX3.5": Brand: MEDLINE INDUSTRIES, INC.

## (undated) DEVICE — SUT PROLN 5/0 RB1 D/A 36IN 8556H

## (undated) DEVICE — GAUZE,SPONGE,4"X4",16PLY,XRAY,STRL,LF: Brand: MEDLINE

## (undated) DEVICE — TOWEL,OR,DSP,ST,BLUE,STD,4/PK,20PK/CS: Brand: MEDLINE

## (undated) DEVICE — CANNULA SEAL

## (undated) DEVICE — TP APPL SPRY EXT PROGEL 11IN

## (undated) DEVICE — SYR TB PRECISIONGLIDE 1CC 26G 3/8IN LF

## (undated) DEVICE — BLADELESS OBTURATOR: Brand: WECK VISTA

## (undated) DEVICE — CATH IV JELCO 18GA 10 1 3/4 IN

## (undated) DEVICE — DAVINCI: Brand: MEDLINE INDUSTRIES, INC.

## (undated) DEVICE — CLTH CLENS READYCLEANSE PERI CARE PK/5

## (undated) DEVICE — TUBING, SUCTION, 1/4" X 12', STRAIGHT: Brand: MEDLINE

## (undated) DEVICE — PROTECT TEETH A/

## (undated) DEVICE — KT ANTI FOG W/FLD AND SPNG

## (undated) DEVICE — TOTAL TRAY, 16FR 10ML SIL FOLEY, URN: Brand: MEDLINE

## (undated) DEVICE — LP VESL MINI BLU PK/2

## (undated) DEVICE — APPL CHLORAPREP HI/LITE 26ML ORNG

## (undated) DEVICE — SPONGE,NEURO,0.5"X3",XR,STRL,LF,10/PK: Brand: MEDLINE

## (undated) DEVICE — DRSNG TELFA PAD NONADH STR 1S 3X8IN

## (undated) DEVICE — 30977 SEE SHARP - ENHANCED INTRAOPERATIVE LAPAROSCOPE CLEANING & DEFOGGING: Brand: 30977 SEE SHARP - ENHANCED INTRAOPERATIVE LAPAROSCOPE CLEANING & DEFOGGING

## (undated) DEVICE — DRAPE,UTILITY,TAPE,15X26,STERILE: Brand: MEDLINE

## (undated) DEVICE — ENDO KIT,LOURDES HOSPITAL: Brand: MEDLINE INDUSTRIES, INC.

## (undated) DEVICE — GLV SURG BIOGEL LTX PF 7 1/2

## (undated) DEVICE — ANTIBACTERIAL UNDYED BRAIDED (POLYGLACTIN 910), SYNTHETIC ABSORBABLE SUTURE: Brand: COATED VICRYL

## (undated) DEVICE — MAJOR DOUBLE BASIN W/GOWNS II: Brand: MEDLINE INDUSTRIES, INC.

## (undated) DEVICE — YANKAUER,BULB TIP WITH VENT: Brand: ARGYLE

## (undated) DEVICE — TROC ANCHORPORT BLADELES W/GRIP 12X120MM

## (undated) DEVICE — SUT SILK 2/0 FS BLK 18IN 685G

## (undated) DEVICE — PK TURNOVER RM ADV

## (undated) DEVICE — OASIS DRAIN, SINGLE, INLINE & ATS COMPATIBLE: Brand: OASIS

## (undated) DEVICE — SEAL

## (undated) DEVICE — SUT PROLN 4/0 RB1 D/A 36IN 8557H

## (undated) DEVICE — SUREFORM 45 CURVED-TIP: Brand: SUREFORM

## (undated) DEVICE — GLV SURG BIOGEL M LTX PF 7 1/2

## (undated) DEVICE — STAPLER 60: Brand: SUREFORM

## (undated) DEVICE — CONTAINER,SPECIMEN,OR STERILE,4OZ: Brand: MEDLINE

## (undated) DEVICE — REDUCER: Brand: ENDOWRIST

## (undated) DEVICE — ST TBG AIRSEAL FLTR TRI LUM

## (undated) DEVICE — TP UMB COTN 1/8X36 U12T